# Patient Record
Sex: MALE | Race: WHITE | NOT HISPANIC OR LATINO | Employment: UNEMPLOYED | URBAN - METROPOLITAN AREA
[De-identification: names, ages, dates, MRNs, and addresses within clinical notes are randomized per-mention and may not be internally consistent; named-entity substitution may affect disease eponyms.]

---

## 2019-06-12 ENCOUNTER — OFFICE VISIT - HEALTHEAST (OUTPATIENT)
Dept: ADDICTION MEDICINE | Facility: CLINIC | Age: 29
End: 2019-06-12

## 2019-06-12 DIAGNOSIS — F10.20 ALCOHOL USE DISORDER, MODERATE, DEPENDENCE (H): ICD-10-CM

## 2019-06-13 ENCOUNTER — AMBULATORY - HEALTHEAST (OUTPATIENT)
Dept: ADDICTION MEDICINE | Facility: CLINIC | Age: 29
End: 2019-06-13

## 2019-06-13 ENCOUNTER — OFFICE VISIT - HEALTHEAST (OUTPATIENT)
Dept: ADDICTION MEDICINE | Facility: CLINIC | Age: 29
End: 2019-06-13

## 2019-06-13 DIAGNOSIS — F10.20 ALCOHOL USE DISORDER, MODERATE, DEPENDENCE (H): ICD-10-CM

## 2019-06-13 DIAGNOSIS — F33.1 MAJOR DEPRESSIVE DISORDER, RECURRENT EPISODE, MODERATE (H): ICD-10-CM

## 2019-06-13 DIAGNOSIS — F41.1 GENERALIZED ANXIETY DISORDER: ICD-10-CM

## 2019-06-14 ENCOUNTER — OFFICE VISIT - HEALTHEAST (OUTPATIENT)
Dept: ADDICTION MEDICINE | Facility: CLINIC | Age: 29
End: 2019-06-14

## 2019-06-14 ENCOUNTER — AMBULATORY - HEALTHEAST (OUTPATIENT)
Dept: ADDICTION MEDICINE | Facility: CLINIC | Age: 29
End: 2019-06-14

## 2019-06-14 DIAGNOSIS — F10.20 ALCOHOL USE DISORDER, MODERATE, DEPENDENCE (H): ICD-10-CM

## 2019-06-17 ENCOUNTER — OFFICE VISIT - HEALTHEAST (OUTPATIENT)
Dept: ADDICTION MEDICINE | Facility: CLINIC | Age: 29
End: 2019-06-17

## 2019-06-17 DIAGNOSIS — F10.20 ALCOHOL USE DISORDER, MODERATE, DEPENDENCE (H): ICD-10-CM

## 2019-06-19 ENCOUNTER — OFFICE VISIT - HEALTHEAST (OUTPATIENT)
Dept: ADDICTION MEDICINE | Facility: CLINIC | Age: 29
End: 2019-06-19

## 2019-06-19 ENCOUNTER — AMBULATORY - HEALTHEAST (OUTPATIENT)
Dept: LAB | Facility: CLINIC | Age: 29
End: 2019-06-19

## 2019-06-19 DIAGNOSIS — F10.20 ALCOHOL USE DISORDER, MODERATE, DEPENDENCE (H): ICD-10-CM

## 2019-06-19 LAB
AMPHETAMINES UR QL SCN: ABNORMAL
BARBITURATES UR QL: ABNORMAL
BENZODIAZ UR QL: ABNORMAL
CANNABINOIDS UR QL SCN: ABNORMAL
COCAINE UR QL: ABNORMAL
CREAT UR-MCNC: 120.7 MG/DL
METHADONE UR QL SCN: ABNORMAL
OPIATES UR QL SCN: ABNORMAL
OXYCODONE UR QL: ABNORMAL
PCP UR QL SCN: ABNORMAL

## 2019-06-20 ENCOUNTER — OFFICE VISIT - HEALTHEAST (OUTPATIENT)
Dept: ADDICTION MEDICINE | Facility: CLINIC | Age: 29
End: 2019-06-20

## 2019-06-20 DIAGNOSIS — F10.20 ALCOHOL USE DISORDER, MODERATE, DEPENDENCE (H): ICD-10-CM

## 2019-06-21 ENCOUNTER — AMBULATORY - HEALTHEAST (OUTPATIENT)
Dept: ADDICTION MEDICINE | Facility: CLINIC | Age: 29
End: 2019-06-21

## 2019-06-21 ENCOUNTER — OFFICE VISIT - HEALTHEAST (OUTPATIENT)
Dept: ADDICTION MEDICINE | Facility: CLINIC | Age: 29
End: 2019-06-21

## 2019-06-21 DIAGNOSIS — F10.20 ALCOHOL USE DISORDER, MODERATE, DEPENDENCE (H): ICD-10-CM

## 2019-06-22 ENCOUNTER — AMBULATORY - HEALTHEAST (OUTPATIENT)
Dept: ADDICTION MEDICINE | Facility: CLINIC | Age: 29
End: 2019-06-22

## 2019-06-22 LAB
ETHYL GLUCURONIDE UR CFM-MCNC: <100 NG/ML
ETHYL SULFATE UR CFM-MCNC: <100 NG/ML

## 2019-06-24 ENCOUNTER — OFFICE VISIT - HEALTHEAST (OUTPATIENT)
Dept: ADDICTION MEDICINE | Facility: CLINIC | Age: 29
End: 2019-06-24

## 2019-06-24 DIAGNOSIS — F10.20 ALCOHOL USE DISORDER, MODERATE, DEPENDENCE (H): ICD-10-CM

## 2019-06-26 ENCOUNTER — OFFICE VISIT - HEALTHEAST (OUTPATIENT)
Dept: ADDICTION MEDICINE | Facility: CLINIC | Age: 29
End: 2019-06-26

## 2019-06-26 DIAGNOSIS — F10.20 ALCOHOL USE DISORDER, MODERATE, DEPENDENCE (H): ICD-10-CM

## 2019-06-27 ENCOUNTER — OFFICE VISIT - HEALTHEAST (OUTPATIENT)
Dept: BEHAVIORAL HEALTH | Facility: CLINIC | Age: 29
End: 2019-06-27

## 2019-06-27 ENCOUNTER — OFFICE VISIT - HEALTHEAST (OUTPATIENT)
Dept: ADDICTION MEDICINE | Facility: CLINIC | Age: 29
End: 2019-06-27

## 2019-06-27 DIAGNOSIS — F33.1 MAJOR DEPRESSIVE DISORDER, RECURRENT EPISODE, MODERATE (H): ICD-10-CM

## 2019-06-27 DIAGNOSIS — F10.20 ALCOHOL USE DISORDER, MODERATE, DEPENDENCE (H): ICD-10-CM

## 2019-06-28 ENCOUNTER — OFFICE VISIT - HEALTHEAST (OUTPATIENT)
Dept: ADDICTION MEDICINE | Facility: CLINIC | Age: 29
End: 2019-06-28

## 2019-06-28 DIAGNOSIS — F10.20 ALCOHOL USE DISORDER, MODERATE, DEPENDENCE (H): ICD-10-CM

## 2019-07-01 ENCOUNTER — OFFICE VISIT - HEALTHEAST (OUTPATIENT)
Dept: ADDICTION MEDICINE | Facility: CLINIC | Age: 29
End: 2019-07-01

## 2019-07-01 ENCOUNTER — AMBULATORY - HEALTHEAST (OUTPATIENT)
Dept: ADDICTION MEDICINE | Facility: CLINIC | Age: 29
End: 2019-07-01

## 2019-07-01 DIAGNOSIS — F10.20 ALCOHOL USE DISORDER, MODERATE, DEPENDENCE (H): ICD-10-CM

## 2019-07-03 ENCOUNTER — OFFICE VISIT - HEALTHEAST (OUTPATIENT)
Dept: ADDICTION MEDICINE | Facility: CLINIC | Age: 29
End: 2019-07-03

## 2019-07-03 DIAGNOSIS — F10.20 ALCOHOL USE DISORDER, MODERATE, DEPENDENCE (H): ICD-10-CM

## 2019-07-04 ENCOUNTER — AMBULATORY - HEALTHEAST (OUTPATIENT)
Dept: ADDICTION MEDICINE | Facility: CLINIC | Age: 29
End: 2019-07-04

## 2019-07-05 ENCOUNTER — AMBULATORY - HEALTHEAST (OUTPATIENT)
Dept: LAB | Facility: CLINIC | Age: 29
End: 2019-07-05

## 2019-07-05 ENCOUNTER — OFFICE VISIT - HEALTHEAST (OUTPATIENT)
Dept: ADDICTION MEDICINE | Facility: CLINIC | Age: 29
End: 2019-07-05

## 2019-07-05 DIAGNOSIS — F10.20 ALCOHOL USE DISORDER, MODERATE, DEPENDENCE (H): ICD-10-CM

## 2019-07-05 LAB
AMPHETAMINES UR QL SCN: NORMAL
BARBITURATES UR QL: NORMAL
BENZODIAZ UR QL: NORMAL
CANNABINOIDS UR QL SCN: NORMAL
COCAINE UR QL: NORMAL
CREAT UR-MCNC: 18.1 MG/DL
METHADONE UR QL SCN: NORMAL
OPIATES UR QL SCN: NORMAL
OXYCODONE UR QL: NORMAL
PCP UR QL SCN: NORMAL

## 2019-07-08 ENCOUNTER — AMBULATORY - HEALTHEAST (OUTPATIENT)
Dept: LAB | Facility: CLINIC | Age: 29
End: 2019-07-08

## 2019-07-08 ENCOUNTER — OFFICE VISIT - HEALTHEAST (OUTPATIENT)
Dept: ADDICTION MEDICINE | Facility: CLINIC | Age: 29
End: 2019-07-08

## 2019-07-08 DIAGNOSIS — F10.20 ALCOHOL USE DISORDER, MODERATE, DEPENDENCE (H): ICD-10-CM

## 2019-07-08 LAB
AMPHETAMINES UR QL SCN: NORMAL
BARBITURATES UR QL: NORMAL
BENZODIAZ UR QL: NORMAL
CANNABINOIDS UR QL SCN: NORMAL
COCAINE UR QL: NORMAL
CREAT UR-MCNC: 22.3 MG/DL
METHADONE UR QL SCN: NORMAL
OPIATES UR QL SCN: NORMAL
OXYCODONE UR QL: NORMAL
PCP UR QL SCN: NORMAL

## 2019-07-10 ENCOUNTER — OFFICE VISIT - HEALTHEAST (OUTPATIENT)
Dept: ADDICTION MEDICINE | Facility: CLINIC | Age: 29
End: 2019-07-10

## 2019-07-10 DIAGNOSIS — F10.20 ALCOHOL USE DISORDER, MODERATE, DEPENDENCE (H): ICD-10-CM

## 2019-07-10 LAB
ETHYL GLUCURONIDE UR CFM-MCNC: <100 NG/ML
ETHYL SULFATE UR CFM-MCNC: <100 NG/ML

## 2019-07-11 ENCOUNTER — COMMUNICATION - HEALTHEAST (OUTPATIENT)
Dept: ADDICTION MEDICINE | Facility: CLINIC | Age: 29
End: 2019-07-11

## 2019-07-12 ENCOUNTER — OFFICE VISIT - HEALTHEAST (OUTPATIENT)
Dept: BEHAVIORAL HEALTH | Facility: CLINIC | Age: 29
End: 2019-07-12

## 2019-07-12 ENCOUNTER — OFFICE VISIT - HEALTHEAST (OUTPATIENT)
Dept: ADDICTION MEDICINE | Facility: CLINIC | Age: 29
End: 2019-07-12

## 2019-07-12 ENCOUNTER — AMBULATORY - HEALTHEAST (OUTPATIENT)
Dept: ADDICTION MEDICINE | Facility: CLINIC | Age: 29
End: 2019-07-12

## 2019-07-12 DIAGNOSIS — F33.1 MAJOR DEPRESSIVE DISORDER, RECURRENT EPISODE, MODERATE (H): ICD-10-CM

## 2019-07-12 DIAGNOSIS — F10.20 ALCOHOL USE DISORDER, MODERATE, DEPENDENCE (H): ICD-10-CM

## 2019-07-15 ENCOUNTER — OFFICE VISIT - HEALTHEAST (OUTPATIENT)
Dept: ADDICTION MEDICINE | Facility: CLINIC | Age: 29
End: 2019-07-15

## 2019-07-15 DIAGNOSIS — F10.20 ALCOHOL USE DISORDER, MODERATE, DEPENDENCE (H): ICD-10-CM

## 2019-07-17 ENCOUNTER — OFFICE VISIT - HEALTHEAST (OUTPATIENT)
Dept: ADDICTION MEDICINE | Facility: CLINIC | Age: 29
End: 2019-07-17

## 2019-07-17 DIAGNOSIS — F10.20 ALCOHOL USE DISORDER, MODERATE, DEPENDENCE (H): ICD-10-CM

## 2019-07-18 ENCOUNTER — AMBULATORY - HEALTHEAST (OUTPATIENT)
Dept: ADDICTION MEDICINE | Facility: CLINIC | Age: 29
End: 2019-07-18

## 2019-07-18 ENCOUNTER — OFFICE VISIT - HEALTHEAST (OUTPATIENT)
Dept: ADDICTION MEDICINE | Facility: CLINIC | Age: 29
End: 2019-07-18

## 2019-07-18 DIAGNOSIS — F10.20 ALCOHOL USE DISORDER, MODERATE, DEPENDENCE (H): ICD-10-CM

## 2019-07-19 ENCOUNTER — AMBULATORY - HEALTHEAST (OUTPATIENT)
Dept: ADDICTION MEDICINE | Facility: CLINIC | Age: 29
End: 2019-07-19

## 2019-07-19 ENCOUNTER — OFFICE VISIT - HEALTHEAST (OUTPATIENT)
Dept: BEHAVIORAL HEALTH | Facility: CLINIC | Age: 29
End: 2019-07-19

## 2019-07-19 ENCOUNTER — OFFICE VISIT - HEALTHEAST (OUTPATIENT)
Dept: ADDICTION MEDICINE | Facility: CLINIC | Age: 29
End: 2019-07-19

## 2019-07-19 DIAGNOSIS — F41.1 GAD (GENERALIZED ANXIETY DISORDER): ICD-10-CM

## 2019-07-19 DIAGNOSIS — F10.20 ALCOHOL USE DISORDER, MODERATE, DEPENDENCE (H): ICD-10-CM

## 2019-07-19 DIAGNOSIS — F10.20 MODERATE ALCOHOL USE DISORDER (H): ICD-10-CM

## 2019-07-19 DIAGNOSIS — F33.1 MAJOR DEPRESSIVE DISORDER, RECURRENT EPISODE, MODERATE (H): ICD-10-CM

## 2019-07-22 ENCOUNTER — OFFICE VISIT - HEALTHEAST (OUTPATIENT)
Dept: ADDICTION MEDICINE | Facility: CLINIC | Age: 29
End: 2019-07-22

## 2019-07-22 DIAGNOSIS — F10.20 ALCOHOL USE DISORDER, MODERATE, DEPENDENCE (H): ICD-10-CM

## 2019-07-24 ENCOUNTER — OFFICE VISIT - HEALTHEAST (OUTPATIENT)
Dept: ADDICTION MEDICINE | Facility: CLINIC | Age: 29
End: 2019-07-24

## 2019-07-24 DIAGNOSIS — F10.20 ALCOHOL USE DISORDER, MODERATE, DEPENDENCE (H): ICD-10-CM

## 2019-07-25 ENCOUNTER — OFFICE VISIT - HEALTHEAST (OUTPATIENT)
Dept: ADDICTION MEDICINE | Facility: CLINIC | Age: 29
End: 2019-07-25

## 2019-07-25 DIAGNOSIS — F10.20 ALCOHOL USE DISORDER, MODERATE, DEPENDENCE (H): ICD-10-CM

## 2019-07-26 ENCOUNTER — AMBULATORY - HEALTHEAST (OUTPATIENT)
Dept: ADDICTION MEDICINE | Facility: CLINIC | Age: 29
End: 2019-07-26

## 2019-07-26 ENCOUNTER — OFFICE VISIT - HEALTHEAST (OUTPATIENT)
Dept: ADDICTION MEDICINE | Facility: CLINIC | Age: 29
End: 2019-07-26

## 2019-07-26 ENCOUNTER — OFFICE VISIT - HEALTHEAST (OUTPATIENT)
Dept: BEHAVIORAL HEALTH | Facility: CLINIC | Age: 29
End: 2019-07-26

## 2019-07-26 ENCOUNTER — AMBULATORY - HEALTHEAST (OUTPATIENT)
Dept: BEHAVIORAL HEALTH | Facility: CLINIC | Age: 29
End: 2019-07-26

## 2019-07-26 DIAGNOSIS — F41.1 GAD (GENERALIZED ANXIETY DISORDER): ICD-10-CM

## 2019-07-26 DIAGNOSIS — F10.20 ALCOHOL USE DISORDER, MODERATE, DEPENDENCE (H): ICD-10-CM

## 2019-07-26 DIAGNOSIS — F10.20 MODERATE ALCOHOL USE DISORDER (H): ICD-10-CM

## 2019-07-26 DIAGNOSIS — F33.1 MAJOR DEPRESSIVE DISORDER, RECURRENT EPISODE, MODERATE (H): ICD-10-CM

## 2019-07-29 ENCOUNTER — OFFICE VISIT - HEALTHEAST (OUTPATIENT)
Dept: ADDICTION MEDICINE | Facility: CLINIC | Age: 29
End: 2019-07-29

## 2019-07-29 DIAGNOSIS — F10.20 ALCOHOL USE DISORDER, MODERATE, DEPENDENCE (H): ICD-10-CM

## 2019-07-31 ENCOUNTER — OFFICE VISIT - HEALTHEAST (OUTPATIENT)
Dept: ADDICTION MEDICINE | Facility: CLINIC | Age: 29
End: 2019-07-31

## 2019-07-31 DIAGNOSIS — F10.20 ALCOHOL USE DISORDER, MODERATE, DEPENDENCE (H): ICD-10-CM

## 2019-08-01 ENCOUNTER — OFFICE VISIT - HEALTHEAST (OUTPATIENT)
Dept: ADDICTION MEDICINE | Facility: CLINIC | Age: 29
End: 2019-08-01

## 2019-08-01 DIAGNOSIS — F10.20 ALCOHOL USE DISORDER, MODERATE, DEPENDENCE (H): ICD-10-CM

## 2019-08-02 ENCOUNTER — AMBULATORY - HEALTHEAST (OUTPATIENT)
Dept: ADDICTION MEDICINE | Facility: CLINIC | Age: 29
End: 2019-08-02

## 2019-08-02 ENCOUNTER — OFFICE VISIT - HEALTHEAST (OUTPATIENT)
Dept: ADDICTION MEDICINE | Facility: CLINIC | Age: 29
End: 2019-08-02

## 2019-08-02 DIAGNOSIS — F10.20 ALCOHOL USE DISORDER, MODERATE, DEPENDENCE (H): ICD-10-CM

## 2019-08-05 ENCOUNTER — AMBULATORY - HEALTHEAST (OUTPATIENT)
Dept: LAB | Facility: CLINIC | Age: 29
End: 2019-08-05

## 2019-08-05 ENCOUNTER — OFFICE VISIT - HEALTHEAST (OUTPATIENT)
Dept: ADDICTION MEDICINE | Facility: CLINIC | Age: 29
End: 2019-08-05

## 2019-08-05 DIAGNOSIS — F10.20 ALCOHOL USE DISORDER, MODERATE, DEPENDENCE (H): ICD-10-CM

## 2019-08-05 LAB
AMPHETAMINES UR QL SCN: ABNORMAL
BARBITURATES UR QL: ABNORMAL
BENZODIAZ UR QL: ABNORMAL
CANNABINOIDS UR QL SCN: ABNORMAL
COCAINE UR QL: ABNORMAL
CREAT UR-MCNC: 99.5 MG/DL
METHADONE UR QL SCN: ABNORMAL
OPIATES UR QL SCN: ABNORMAL
OXYCODONE UR QL: ABNORMAL
PCP UR QL SCN: ABNORMAL

## 2019-08-07 ENCOUNTER — OFFICE VISIT - HEALTHEAST (OUTPATIENT)
Dept: ADDICTION MEDICINE | Facility: CLINIC | Age: 29
End: 2019-08-07

## 2019-08-07 DIAGNOSIS — F10.20 ALCOHOL USE DISORDER, MODERATE, DEPENDENCE (H): ICD-10-CM

## 2019-08-07 LAB
ETHYL GLUCURONIDE UR CFM-MCNC: <100 NG/ML
ETHYL SULFATE UR CFM-MCNC: <100 NG/ML

## 2019-08-08 ENCOUNTER — OFFICE VISIT - HEALTHEAST (OUTPATIENT)
Dept: BEHAVIORAL HEALTH | Facility: CLINIC | Age: 29
End: 2019-08-08

## 2019-08-08 DIAGNOSIS — F10.20 MODERATE ALCOHOL USE DISORDER (H): ICD-10-CM

## 2019-08-08 DIAGNOSIS — F33.1 MAJOR DEPRESSIVE DISORDER, RECURRENT EPISODE, MODERATE (H): ICD-10-CM

## 2019-08-08 DIAGNOSIS — F41.1 GAD (GENERALIZED ANXIETY DISORDER): ICD-10-CM

## 2019-08-09 ENCOUNTER — OFFICE VISIT - HEALTHEAST (OUTPATIENT)
Dept: ADDICTION MEDICINE | Facility: CLINIC | Age: 29
End: 2019-08-09

## 2019-08-09 DIAGNOSIS — F10.20 ALCOHOL USE DISORDER, MODERATE, DEPENDENCE (H): ICD-10-CM

## 2019-08-10 ENCOUNTER — AMBULATORY - HEALTHEAST (OUTPATIENT)
Dept: ADDICTION MEDICINE | Facility: CLINIC | Age: 29
End: 2019-08-10

## 2019-08-12 ENCOUNTER — AMBULATORY - HEALTHEAST (OUTPATIENT)
Dept: ADDICTION MEDICINE | Facility: CLINIC | Age: 29
End: 2019-08-12

## 2019-08-12 ENCOUNTER — OFFICE VISIT - HEALTHEAST (OUTPATIENT)
Dept: ADDICTION MEDICINE | Facility: CLINIC | Age: 29
End: 2019-08-12

## 2019-08-12 DIAGNOSIS — F10.20 ALCOHOL USE DISORDER, MODERATE, DEPENDENCE (H): ICD-10-CM

## 2019-08-13 ENCOUNTER — COMMUNICATION - HEALTHEAST (OUTPATIENT)
Dept: ADDICTION MEDICINE | Facility: CLINIC | Age: 29
End: 2019-08-13

## 2019-08-14 ENCOUNTER — COMMUNICATION - HEALTHEAST (OUTPATIENT)
Dept: ADDICTION MEDICINE | Facility: CLINIC | Age: 29
End: 2019-08-14

## 2019-08-15 ENCOUNTER — OFFICE VISIT - HEALTHEAST (OUTPATIENT)
Dept: ADDICTION MEDICINE | Facility: CLINIC | Age: 29
End: 2019-08-15

## 2019-08-15 DIAGNOSIS — F10.20 ALCOHOL USE DISORDER, MODERATE, DEPENDENCE (H): ICD-10-CM

## 2019-08-16 ENCOUNTER — OFFICE VISIT - HEALTHEAST (OUTPATIENT)
Dept: ADDICTION MEDICINE | Facility: CLINIC | Age: 29
End: 2019-08-16

## 2019-08-16 ENCOUNTER — OFFICE VISIT - HEALTHEAST (OUTPATIENT)
Dept: BEHAVIORAL HEALTH | Facility: CLINIC | Age: 29
End: 2019-08-16

## 2019-08-16 DIAGNOSIS — F33.1 MAJOR DEPRESSIVE DISORDER, RECURRENT EPISODE, MODERATE (H): ICD-10-CM

## 2019-08-16 DIAGNOSIS — F10.20 MODERATE ALCOHOL USE DISORDER (H): ICD-10-CM

## 2019-08-16 DIAGNOSIS — F41.1 GAD (GENERALIZED ANXIETY DISORDER): ICD-10-CM

## 2019-08-16 DIAGNOSIS — F10.20 ALCOHOL USE DISORDER, MODERATE, DEPENDENCE (H): ICD-10-CM

## 2019-08-17 ENCOUNTER — AMBULATORY - HEALTHEAST (OUTPATIENT)
Dept: ADDICTION MEDICINE | Facility: CLINIC | Age: 29
End: 2019-08-17

## 2019-08-19 ENCOUNTER — OFFICE VISIT - HEALTHEAST (OUTPATIENT)
Dept: ADDICTION MEDICINE | Facility: CLINIC | Age: 29
End: 2019-08-19

## 2019-08-19 DIAGNOSIS — F10.20 ALCOHOL USE DISORDER, MODERATE, DEPENDENCE (H): ICD-10-CM

## 2019-08-22 ENCOUNTER — OFFICE VISIT - HEALTHEAST (OUTPATIENT)
Dept: ADDICTION MEDICINE | Facility: CLINIC | Age: 29
End: 2019-08-22

## 2019-08-22 DIAGNOSIS — F10.20 ALCOHOL USE DISORDER, MODERATE, DEPENDENCE (H): ICD-10-CM

## 2019-08-23 ENCOUNTER — OFFICE VISIT - HEALTHEAST (OUTPATIENT)
Dept: ADDICTION MEDICINE | Facility: CLINIC | Age: 29
End: 2019-08-23

## 2019-08-23 ENCOUNTER — AMBULATORY - HEALTHEAST (OUTPATIENT)
Dept: ADDICTION MEDICINE | Facility: CLINIC | Age: 29
End: 2019-08-23

## 2019-08-23 ENCOUNTER — OFFICE VISIT - HEALTHEAST (OUTPATIENT)
Dept: BEHAVIORAL HEALTH | Facility: CLINIC | Age: 29
End: 2019-08-23

## 2019-08-23 DIAGNOSIS — F33.1 MAJOR DEPRESSIVE DISORDER, RECURRENT EPISODE, MODERATE (H): ICD-10-CM

## 2019-08-23 DIAGNOSIS — F10.20 MODERATE ALCOHOL USE DISORDER (H): ICD-10-CM

## 2019-08-23 DIAGNOSIS — F10.20 ALCOHOL USE DISORDER, MODERATE, DEPENDENCE (H): ICD-10-CM

## 2019-08-23 DIAGNOSIS — F41.1 GAD (GENERALIZED ANXIETY DISORDER): ICD-10-CM

## 2019-08-26 ENCOUNTER — OFFICE VISIT - HEALTHEAST (OUTPATIENT)
Dept: ADDICTION MEDICINE | Facility: CLINIC | Age: 29
End: 2019-08-26

## 2019-08-26 ENCOUNTER — AMBULATORY - HEALTHEAST (OUTPATIENT)
Dept: ADDICTION MEDICINE | Facility: CLINIC | Age: 29
End: 2019-08-26

## 2019-08-26 DIAGNOSIS — F10.20 ALCOHOL USE DISORDER, MODERATE, DEPENDENCE (H): ICD-10-CM

## 2019-08-27 ENCOUNTER — OFFICE VISIT - HEALTHEAST (OUTPATIENT)
Dept: ADDICTION MEDICINE | Facility: CLINIC | Age: 29
End: 2019-08-27

## 2019-08-27 DIAGNOSIS — F10.20 ALCOHOL USE DISORDER, MODERATE, DEPENDENCE (H): ICD-10-CM

## 2019-08-28 ENCOUNTER — OFFICE VISIT - HEALTHEAST (OUTPATIENT)
Dept: ADDICTION MEDICINE | Facility: CLINIC | Age: 29
End: 2019-08-28

## 2019-08-28 DIAGNOSIS — F10.20 ALCOHOL USE DISORDER, MODERATE, DEPENDENCE (H): ICD-10-CM

## 2019-08-29 ENCOUNTER — OFFICE VISIT - HEALTHEAST (OUTPATIENT)
Dept: ADDICTION MEDICINE | Facility: CLINIC | Age: 29
End: 2019-08-29

## 2019-08-29 DIAGNOSIS — F10.20 ALCOHOL USE DISORDER, MODERATE, DEPENDENCE (H): ICD-10-CM

## 2019-08-30 ENCOUNTER — OFFICE VISIT - HEALTHEAST (OUTPATIENT)
Dept: BEHAVIORAL HEALTH | Facility: CLINIC | Age: 29
End: 2019-08-30

## 2019-08-30 ENCOUNTER — OFFICE VISIT - HEALTHEAST (OUTPATIENT)
Dept: ADDICTION MEDICINE | Facility: CLINIC | Age: 29
End: 2019-08-30

## 2019-08-30 ENCOUNTER — AMBULATORY - HEALTHEAST (OUTPATIENT)
Dept: ADDICTION MEDICINE | Facility: CLINIC | Age: 29
End: 2019-08-30

## 2019-08-30 DIAGNOSIS — F41.1 GAD (GENERALIZED ANXIETY DISORDER): ICD-10-CM

## 2019-08-30 DIAGNOSIS — F10.20 ALCOHOL USE DISORDER, MODERATE, DEPENDENCE (H): ICD-10-CM

## 2019-08-30 DIAGNOSIS — F33.1 MAJOR DEPRESSIVE DISORDER, RECURRENT EPISODE, MODERATE (H): ICD-10-CM

## 2019-08-30 DIAGNOSIS — F10.20 MODERATE ALCOHOL USE DISORDER (H): ICD-10-CM

## 2019-09-03 ENCOUNTER — OFFICE VISIT - HEALTHEAST (OUTPATIENT)
Dept: ADDICTION MEDICINE | Facility: CLINIC | Age: 29
End: 2019-09-03

## 2019-09-03 DIAGNOSIS — F10.20 ALCOHOL USE DISORDER, MODERATE, DEPENDENCE (H): ICD-10-CM

## 2019-09-04 ENCOUNTER — OFFICE VISIT - HEALTHEAST (OUTPATIENT)
Dept: ADDICTION MEDICINE | Facility: CLINIC | Age: 29
End: 2019-09-04

## 2019-09-04 ENCOUNTER — COMMUNICATION - HEALTHEAST (OUTPATIENT)
Dept: ADDICTION MEDICINE | Facility: CLINIC | Age: 29
End: 2019-09-04

## 2019-09-04 DIAGNOSIS — F10.20 ALCOHOL USE DISORDER, MODERATE, DEPENDENCE (H): ICD-10-CM

## 2019-09-05 ENCOUNTER — OFFICE VISIT - HEALTHEAST (OUTPATIENT)
Dept: ADDICTION MEDICINE | Facility: CLINIC | Age: 29
End: 2019-09-05

## 2019-09-05 DIAGNOSIS — F10.20 ALCOHOL USE DISORDER, MODERATE, DEPENDENCE (H): ICD-10-CM

## 2019-09-06 ENCOUNTER — OFFICE VISIT - HEALTHEAST (OUTPATIENT)
Dept: ADDICTION MEDICINE | Facility: CLINIC | Age: 29
End: 2019-09-06

## 2019-09-06 ENCOUNTER — OFFICE VISIT - HEALTHEAST (OUTPATIENT)
Dept: BEHAVIORAL HEALTH | Facility: CLINIC | Age: 29
End: 2019-09-06

## 2019-09-06 DIAGNOSIS — F10.20 ALCOHOL USE DISORDER, MODERATE, DEPENDENCE (H): ICD-10-CM

## 2019-09-06 DIAGNOSIS — F33.1 MAJOR DEPRESSIVE DISORDER, RECURRENT EPISODE, MODERATE (H): ICD-10-CM

## 2019-09-06 DIAGNOSIS — F10.20 MODERATE ALCOHOL USE DISORDER (H): ICD-10-CM

## 2019-09-06 DIAGNOSIS — F41.1 GAD (GENERALIZED ANXIETY DISORDER): ICD-10-CM

## 2019-09-07 ENCOUNTER — AMBULATORY - HEALTHEAST (OUTPATIENT)
Dept: ADDICTION MEDICINE | Facility: CLINIC | Age: 29
End: 2019-09-07

## 2019-09-09 ENCOUNTER — OFFICE VISIT - HEALTHEAST (OUTPATIENT)
Dept: ADDICTION MEDICINE | Facility: CLINIC | Age: 29
End: 2019-09-09

## 2019-09-09 DIAGNOSIS — F10.20 ALCOHOL USE DISORDER, MODERATE, DEPENDENCE (H): ICD-10-CM

## 2019-09-10 ENCOUNTER — COMMUNICATION - HEALTHEAST (OUTPATIENT)
Dept: ADDICTION MEDICINE | Facility: CLINIC | Age: 29
End: 2019-09-10

## 2019-09-11 ENCOUNTER — OFFICE VISIT - HEALTHEAST (OUTPATIENT)
Dept: ADDICTION MEDICINE | Facility: CLINIC | Age: 29
End: 2019-09-11

## 2019-09-11 DIAGNOSIS — F10.20 ALCOHOL USE DISORDER, MODERATE, DEPENDENCE (H): ICD-10-CM

## 2019-09-12 ENCOUNTER — COMMUNICATION - HEALTHEAST (OUTPATIENT)
Dept: ADDICTION MEDICINE | Facility: CLINIC | Age: 29
End: 2019-09-12

## 2019-09-13 ENCOUNTER — OFFICE VISIT - HEALTHEAST (OUTPATIENT)
Dept: ADDICTION MEDICINE | Facility: CLINIC | Age: 29
End: 2019-09-13

## 2019-09-13 ENCOUNTER — OFFICE VISIT - HEALTHEAST (OUTPATIENT)
Dept: BEHAVIORAL HEALTH | Facility: CLINIC | Age: 29
End: 2019-09-13

## 2019-09-13 ENCOUNTER — AMBULATORY - HEALTHEAST (OUTPATIENT)
Dept: ADDICTION MEDICINE | Facility: CLINIC | Age: 29
End: 2019-09-13

## 2019-09-13 DIAGNOSIS — F33.1 MAJOR DEPRESSIVE DISORDER, RECURRENT EPISODE, MODERATE (H): ICD-10-CM

## 2019-09-13 DIAGNOSIS — F10.20 ALCOHOL USE DISORDER, MODERATE, DEPENDENCE (H): ICD-10-CM

## 2019-09-13 DIAGNOSIS — F41.1 GAD (GENERALIZED ANXIETY DISORDER): ICD-10-CM

## 2019-09-13 DIAGNOSIS — F10.20 MODERATE ALCOHOL USE DISORDER (H): ICD-10-CM

## 2019-09-16 ENCOUNTER — OFFICE VISIT - HEALTHEAST (OUTPATIENT)
Dept: ADDICTION MEDICINE | Facility: CLINIC | Age: 29
End: 2019-09-16

## 2019-09-16 DIAGNOSIS — F10.20 ALCOHOL USE DISORDER, MODERATE, DEPENDENCE (H): ICD-10-CM

## 2019-09-17 ENCOUNTER — COMMUNICATION - HEALTHEAST (OUTPATIENT)
Dept: BEHAVIORAL HEALTH | Facility: CLINIC | Age: 29
End: 2019-09-17

## 2019-09-17 ENCOUNTER — COMMUNICATION - HEALTHEAST (OUTPATIENT)
Dept: ADDICTION MEDICINE | Facility: CLINIC | Age: 29
End: 2019-09-17

## 2019-09-19 ENCOUNTER — AMBULATORY - HEALTHEAST (OUTPATIENT)
Dept: ADDICTION MEDICINE | Facility: CLINIC | Age: 29
End: 2019-09-19

## 2019-09-19 ENCOUNTER — OFFICE VISIT - HEALTHEAST (OUTPATIENT)
Dept: ADDICTION MEDICINE | Facility: CLINIC | Age: 29
End: 2019-09-19

## 2019-09-19 DIAGNOSIS — F10.20 ALCOHOL USE DISORDER, MODERATE, DEPENDENCE (H): ICD-10-CM

## 2019-09-20 ENCOUNTER — OFFICE VISIT - HEALTHEAST (OUTPATIENT)
Dept: BEHAVIORAL HEALTH | Facility: CLINIC | Age: 29
End: 2019-09-20

## 2019-09-20 DIAGNOSIS — F10.20 MODERATE ALCOHOL USE DISORDER (H): ICD-10-CM

## 2019-09-20 DIAGNOSIS — F33.1 MAJOR DEPRESSIVE DISORDER, RECURRENT EPISODE, MODERATE (H): ICD-10-CM

## 2019-09-20 DIAGNOSIS — F41.1 GAD (GENERALIZED ANXIETY DISORDER): ICD-10-CM

## 2019-09-24 ENCOUNTER — OFFICE VISIT - HEALTHEAST (OUTPATIENT)
Dept: ADDICTION MEDICINE | Facility: CLINIC | Age: 29
End: 2019-09-24

## 2019-09-24 DIAGNOSIS — F10.20 ALCOHOL USE DISORDER, MODERATE, DEPENDENCE (H): ICD-10-CM

## 2019-09-25 ENCOUNTER — OFFICE VISIT - HEALTHEAST (OUTPATIENT)
Dept: BEHAVIORAL HEALTH | Facility: CLINIC | Age: 29
End: 2019-09-25

## 2019-09-25 DIAGNOSIS — F10.20 MODERATE ALCOHOL USE DISORDER (H): ICD-10-CM

## 2019-09-25 DIAGNOSIS — F33.1 MAJOR DEPRESSIVE DISORDER, RECURRENT EPISODE, MODERATE (H): ICD-10-CM

## 2019-09-25 DIAGNOSIS — F41.1 GAD (GENERALIZED ANXIETY DISORDER): ICD-10-CM

## 2019-09-26 ENCOUNTER — AMBULATORY - HEALTHEAST (OUTPATIENT)
Dept: ADDICTION MEDICINE | Facility: CLINIC | Age: 29
End: 2019-09-26

## 2019-09-27 ENCOUNTER — OFFICE VISIT - HEALTHEAST (OUTPATIENT)
Dept: BEHAVIORAL HEALTH | Facility: CLINIC | Age: 29
End: 2019-09-27

## 2019-09-27 DIAGNOSIS — F33.1 MAJOR DEPRESSIVE DISORDER, RECURRENT EPISODE, MODERATE (H): ICD-10-CM

## 2019-09-27 DIAGNOSIS — F41.1 GAD (GENERALIZED ANXIETY DISORDER): ICD-10-CM

## 2019-09-27 DIAGNOSIS — F10.20 MODERATE ALCOHOL USE DISORDER (H): ICD-10-CM

## 2019-10-01 ENCOUNTER — OFFICE VISIT - HEALTHEAST (OUTPATIENT)
Dept: ADDICTION MEDICINE | Facility: CLINIC | Age: 29
End: 2019-10-01

## 2019-10-01 DIAGNOSIS — F10.20 ALCOHOL USE DISORDER, MODERATE, DEPENDENCE (H): ICD-10-CM

## 2019-10-02 ENCOUNTER — COMMUNICATION - HEALTHEAST (OUTPATIENT)
Dept: ADDICTION MEDICINE | Facility: CLINIC | Age: 29
End: 2019-10-02

## 2019-10-02 ENCOUNTER — AMBULATORY - HEALTHEAST (OUTPATIENT)
Dept: BEHAVIORAL HEALTH | Facility: CLINIC | Age: 29
End: 2019-10-02

## 2019-10-03 ENCOUNTER — OFFICE VISIT - HEALTHEAST (OUTPATIENT)
Dept: BEHAVIORAL HEALTH | Facility: CLINIC | Age: 29
End: 2019-10-03

## 2019-10-03 ENCOUNTER — OFFICE VISIT - HEALTHEAST (OUTPATIENT)
Dept: INTERNAL MEDICINE | Facility: CLINIC | Age: 29
End: 2019-10-03

## 2019-10-03 ENCOUNTER — AMBULATORY - HEALTHEAST (OUTPATIENT)
Dept: ADDICTION MEDICINE | Facility: CLINIC | Age: 29
End: 2019-10-03

## 2019-10-03 DIAGNOSIS — Z00.00 ROUTINE GENERAL MEDICAL EXAMINATION AT A HEALTH CARE FACILITY: ICD-10-CM

## 2019-10-03 DIAGNOSIS — R30.0 DYSURIA: ICD-10-CM

## 2019-10-03 DIAGNOSIS — F50.89 ATYPICAL EATING DISORDER: ICD-10-CM

## 2019-10-03 DIAGNOSIS — F60.3 BORDERLINE PERSONALITY DISORDER (H): ICD-10-CM

## 2019-10-03 DIAGNOSIS — F33.1 MAJOR DEPRESSIVE DISORDER, RECURRENT EPISODE, MODERATE (H): ICD-10-CM

## 2019-10-03 DIAGNOSIS — F10.20 MODERATE ALCOHOL USE DISORDER (H): ICD-10-CM

## 2019-10-03 DIAGNOSIS — F33.1 MODERATE EPISODE OF RECURRENT MAJOR DEPRESSIVE DISORDER (H): ICD-10-CM

## 2019-10-03 DIAGNOSIS — F41.1 GAD (GENERALIZED ANXIETY DISORDER): ICD-10-CM

## 2019-10-03 LAB
ALBUMIN SERPL-MCNC: 3.9 G/DL (ref 3.5–5)
ALBUMIN UR-MCNC: NEGATIVE MG/DL
ALP SERPL-CCNC: 112 U/L (ref 45–120)
ALT SERPL W P-5'-P-CCNC: 30 U/L (ref 0–45)
ANION GAP SERPL CALCULATED.3IONS-SCNC: 8 MMOL/L (ref 5–18)
APPEARANCE UR: CLEAR
AST SERPL W P-5'-P-CCNC: 28 U/L (ref 0–40)
BILIRUB SERPL-MCNC: 0.9 MG/DL (ref 0–1)
BILIRUB UR QL STRIP: NEGATIVE
BUN SERPL-MCNC: 14 MG/DL (ref 8–22)
CALCIUM SERPL-MCNC: 9.5 MG/DL (ref 8.5–10.5)
CHLORIDE BLD-SCNC: 104 MMOL/L (ref 98–107)
CHOLEST SERPL-MCNC: 157 MG/DL
CO2 SERPL-SCNC: 27 MMOL/L (ref 22–31)
COLOR UR AUTO: YELLOW
CREAT SERPL-MCNC: 0.8 MG/DL (ref 0.7–1.3)
ERYTHROCYTE [DISTWIDTH] IN BLOOD BY AUTOMATED COUNT: 10.9 % (ref 11–14.5)
FASTING STATUS PATIENT QL REPORTED: YES
GFR SERPL CREATININE-BSD FRML MDRD: >60 ML/MIN/1.73M2
GLUCOSE BLD-MCNC: 92 MG/DL (ref 70–125)
GLUCOSE UR STRIP-MCNC: NEGATIVE MG/DL
HCT VFR BLD AUTO: 40.3 % (ref 40–54)
HDLC SERPL-MCNC: 77 MG/DL
HGB BLD-MCNC: 13.8 G/DL (ref 14–18)
HGB UR QL STRIP: NEGATIVE
HIV 1+2 AB+HIV1 P24 AG SERPL QL IA: NEGATIVE
KETONES UR STRIP-MCNC: NEGATIVE MG/DL
LDLC SERPL CALC-MCNC: 69 MG/DL
LEUKOCYTE ESTERASE UR QL STRIP: NEGATIVE
MCH RBC QN AUTO: 33.7 PG (ref 27–34)
MCHC RBC AUTO-ENTMCNC: 34.3 G/DL (ref 32–36)
MCV RBC AUTO: 98 FL (ref 80–100)
NITRATE UR QL: NEGATIVE
PH UR STRIP: 6 [PH] (ref 5–8)
PLATELET # BLD AUTO: 371 THOU/UL (ref 140–440)
PMV BLD AUTO: 6.8 FL (ref 7–10)
POTASSIUM BLD-SCNC: 4.3 MMOL/L (ref 3.5–5)
PROT SERPL-MCNC: 7.3 G/DL (ref 6–8)
RBC # BLD AUTO: 4.11 MILL/UL (ref 4.4–6.2)
SODIUM SERPL-SCNC: 139 MMOL/L (ref 136–145)
SP GR UR STRIP: 1.01 (ref 1–1.03)
TRIGL SERPL-MCNC: 55 MG/DL
TSH SERPL DL<=0.005 MIU/L-ACNC: 0.91 UIU/ML (ref 0.3–5)
UROBILINOGEN UR STRIP-ACNC: NORMAL
WBC: 8.8 THOU/UL (ref 4–11)

## 2019-10-03 RX ORDER — LORAZEPAM 0.5 MG/1
1 TABLET ORAL EVERY 8 HOURS PRN
Refills: 3 | Status: ON HOLD | COMMUNITY
Start: 2019-09-30 | End: 2022-04-10

## 2019-10-03 ASSESSMENT — PATIENT HEALTH QUESTIONNAIRE - PHQ9: SUM OF ALL RESPONSES TO PHQ QUESTIONS 1-9: 16

## 2019-10-03 ASSESSMENT — MIFFLIN-ST. JEOR: SCORE: 1634.85

## 2019-10-04 LAB — HCV AB SERPL QL IA: NEGATIVE

## 2019-10-07 ENCOUNTER — COMMUNICATION - HEALTHEAST (OUTPATIENT)
Dept: ADDICTION MEDICINE | Facility: CLINIC | Age: 29
End: 2019-10-07

## 2019-10-08 ENCOUNTER — OFFICE VISIT - HEALTHEAST (OUTPATIENT)
Dept: ADDICTION MEDICINE | Facility: CLINIC | Age: 29
End: 2019-10-08

## 2019-10-08 ENCOUNTER — AMBULATORY - HEALTHEAST (OUTPATIENT)
Dept: ADDICTION MEDICINE | Facility: CLINIC | Age: 29
End: 2019-10-08

## 2019-10-08 DIAGNOSIS — F10.20 ALCOHOL USE DISORDER, MODERATE, DEPENDENCE (H): ICD-10-CM

## 2019-10-09 ENCOUNTER — OFFICE VISIT - HEALTHEAST (OUTPATIENT)
Dept: BEHAVIORAL HEALTH | Facility: CLINIC | Age: 29
End: 2019-10-09

## 2019-10-09 DIAGNOSIS — F10.20 MODERATE ALCOHOL USE DISORDER (H): ICD-10-CM

## 2019-10-09 DIAGNOSIS — F33.1 MAJOR DEPRESSIVE DISORDER, RECURRENT EPISODE, MODERATE (H): ICD-10-CM

## 2019-10-09 DIAGNOSIS — F41.1 GAD (GENERALIZED ANXIETY DISORDER): ICD-10-CM

## 2019-10-10 ENCOUNTER — COMMUNICATION - HEALTHEAST (OUTPATIENT)
Dept: ADDICTION MEDICINE | Facility: CLINIC | Age: 29
End: 2019-10-10

## 2019-10-11 ENCOUNTER — OFFICE VISIT - HEALTHEAST (OUTPATIENT)
Dept: BEHAVIORAL HEALTH | Facility: CLINIC | Age: 29
End: 2019-10-11

## 2019-10-11 DIAGNOSIS — F10.20 MODERATE ALCOHOL USE DISORDER (H): ICD-10-CM

## 2019-10-11 DIAGNOSIS — F33.1 MAJOR DEPRESSIVE DISORDER, RECURRENT EPISODE, MODERATE (H): ICD-10-CM

## 2019-10-11 DIAGNOSIS — F41.1 GAD (GENERALIZED ANXIETY DISORDER): ICD-10-CM

## 2019-10-15 ENCOUNTER — OFFICE VISIT - HEALTHEAST (OUTPATIENT)
Dept: ADDICTION MEDICINE | Facility: CLINIC | Age: 29
End: 2019-10-15

## 2019-10-15 ENCOUNTER — COMMUNICATION - HEALTHEAST (OUTPATIENT)
Dept: BEHAVIORAL HEALTH | Facility: HOSPITAL | Age: 29
End: 2019-10-15

## 2019-10-15 DIAGNOSIS — F10.20 ALCOHOL USE DISORDER, MODERATE, DEPENDENCE (H): ICD-10-CM

## 2019-10-16 ENCOUNTER — OFFICE VISIT - HEALTHEAST (OUTPATIENT)
Dept: BEHAVIORAL HEALTH | Facility: CLINIC | Age: 29
End: 2019-10-16

## 2019-10-16 DIAGNOSIS — F33.1 MAJOR DEPRESSIVE DISORDER, RECURRENT EPISODE, MODERATE (H): ICD-10-CM

## 2019-10-16 DIAGNOSIS — F10.20 MODERATE ALCOHOL USE DISORDER (H): ICD-10-CM

## 2019-10-16 DIAGNOSIS — F41.1 GAD (GENERALIZED ANXIETY DISORDER): ICD-10-CM

## 2019-10-18 ENCOUNTER — COMMUNICATION - HEALTHEAST (OUTPATIENT)
Dept: ADDICTION MEDICINE | Facility: CLINIC | Age: 29
End: 2019-10-18

## 2019-10-18 ENCOUNTER — AMBULATORY - HEALTHEAST (OUTPATIENT)
Dept: ADDICTION MEDICINE | Facility: CLINIC | Age: 29
End: 2019-10-18

## 2019-10-21 ENCOUNTER — COMMUNICATION - HEALTHEAST (OUTPATIENT)
Dept: ADDICTION MEDICINE | Facility: CLINIC | Age: 29
End: 2019-10-21

## 2019-10-22 ENCOUNTER — OFFICE VISIT - HEALTHEAST (OUTPATIENT)
Dept: ADDICTION MEDICINE | Facility: CLINIC | Age: 29
End: 2019-10-22

## 2019-10-22 DIAGNOSIS — F10.20 ALCOHOL USE DISORDER, MODERATE, DEPENDENCE (H): ICD-10-CM

## 2019-10-24 ENCOUNTER — COMMUNICATION - HEALTHEAST (OUTPATIENT)
Dept: ADDICTION MEDICINE | Facility: CLINIC | Age: 29
End: 2019-10-24

## 2019-10-24 ENCOUNTER — AMBULATORY - HEALTHEAST (OUTPATIENT)
Dept: ADDICTION MEDICINE | Facility: CLINIC | Age: 29
End: 2019-10-24

## 2019-10-25 ENCOUNTER — OFFICE VISIT - HEALTHEAST (OUTPATIENT)
Dept: BEHAVIORAL HEALTH | Facility: CLINIC | Age: 29
End: 2019-10-25

## 2019-10-25 ENCOUNTER — AMBULATORY - HEALTHEAST (OUTPATIENT)
Dept: BEHAVIORAL HEALTH | Facility: CLINIC | Age: 29
End: 2019-10-25

## 2019-10-25 DIAGNOSIS — F33.1 MAJOR DEPRESSIVE DISORDER, RECURRENT EPISODE, MODERATE (H): ICD-10-CM

## 2019-10-25 DIAGNOSIS — F10.20 MODERATE ALCOHOL USE DISORDER (H): ICD-10-CM

## 2019-10-25 DIAGNOSIS — F41.1 GAD (GENERALIZED ANXIETY DISORDER): ICD-10-CM

## 2019-10-28 ENCOUNTER — AMBULATORY - HEALTHEAST (OUTPATIENT)
Dept: ADDICTION MEDICINE | Facility: CLINIC | Age: 29
End: 2019-10-28

## 2019-10-29 ENCOUNTER — OFFICE VISIT - HEALTHEAST (OUTPATIENT)
Dept: ADDICTION MEDICINE | Facility: CLINIC | Age: 29
End: 2019-10-29

## 2019-10-29 DIAGNOSIS — F10.20 ALCOHOL USE DISORDER, MODERATE, DEPENDENCE (H): ICD-10-CM

## 2019-10-30 ENCOUNTER — COMMUNICATION - HEALTHEAST (OUTPATIENT)
Dept: ADDICTION MEDICINE | Facility: CLINIC | Age: 29
End: 2019-10-30

## 2019-10-30 ENCOUNTER — OFFICE VISIT - HEALTHEAST (OUTPATIENT)
Dept: BEHAVIORAL HEALTH | Facility: CLINIC | Age: 29
End: 2019-10-30

## 2019-10-30 DIAGNOSIS — F33.1 MAJOR DEPRESSIVE DISORDER, RECURRENT EPISODE, MODERATE (H): ICD-10-CM

## 2019-10-30 DIAGNOSIS — F41.1 GAD (GENERALIZED ANXIETY DISORDER): ICD-10-CM

## 2019-10-30 DIAGNOSIS — F10.20 MODERATE ALCOHOL USE DISORDER (H): ICD-10-CM

## 2019-10-31 ENCOUNTER — AMBULATORY - HEALTHEAST (OUTPATIENT)
Dept: ADDICTION MEDICINE | Facility: CLINIC | Age: 29
End: 2019-10-31

## 2019-11-01 ENCOUNTER — OFFICE VISIT - HEALTHEAST (OUTPATIENT)
Dept: INTERNAL MEDICINE | Facility: CLINIC | Age: 29
End: 2019-11-01

## 2019-11-01 ENCOUNTER — OFFICE VISIT - HEALTHEAST (OUTPATIENT)
Dept: BEHAVIORAL HEALTH | Facility: CLINIC | Age: 29
End: 2019-11-01

## 2019-11-01 ENCOUNTER — AMBULATORY - HEALTHEAST (OUTPATIENT)
Dept: ADDICTION MEDICINE | Facility: CLINIC | Age: 29
End: 2019-11-01

## 2019-11-01 ENCOUNTER — COMMUNICATION - HEALTHEAST (OUTPATIENT)
Dept: INTERNAL MEDICINE | Facility: CLINIC | Age: 29
End: 2019-11-01

## 2019-11-01 ENCOUNTER — AMBULATORY - HEALTHEAST (OUTPATIENT)
Dept: BEHAVIORAL HEALTH | Facility: CLINIC | Age: 29
End: 2019-11-01

## 2019-11-01 DIAGNOSIS — F50.89 ATYPICAL EATING DISORDER: ICD-10-CM

## 2019-11-01 DIAGNOSIS — F33.1 MAJOR DEPRESSIVE DISORDER, RECURRENT EPISODE, MODERATE (H): ICD-10-CM

## 2019-11-01 DIAGNOSIS — F10.20 MODERATE ALCOHOL USE DISORDER (H): ICD-10-CM

## 2019-11-01 DIAGNOSIS — F41.1 GAD (GENERALIZED ANXIETY DISORDER): ICD-10-CM

## 2019-11-01 RX ORDER — ZOLPIDEM TARTRATE 12.5 MG/1
TABLET, FILM COATED, EXTENDED RELEASE ORAL
Refills: 0 | Status: ON HOLD | COMMUNITY
Start: 2019-10-29 | End: 2022-04-10

## 2019-11-01 ASSESSMENT — MIFFLIN-ST. JEOR: SCORE: 1634.85

## 2019-11-04 ENCOUNTER — AMBULATORY - HEALTHEAST (OUTPATIENT)
Dept: ADDICTION MEDICINE | Facility: CLINIC | Age: 29
End: 2019-11-04

## 2019-11-05 ENCOUNTER — COMMUNICATION - HEALTHEAST (OUTPATIENT)
Dept: INTERNAL MEDICINE | Facility: CLINIC | Age: 29
End: 2019-11-05

## 2019-11-12 ENCOUNTER — AMBULATORY - HEALTHEAST (OUTPATIENT)
Dept: BEHAVIORAL HEALTH | Facility: CLINIC | Age: 29
End: 2019-11-12

## 2019-11-18 ENCOUNTER — COMMUNICATION - HEALTHEAST (OUTPATIENT)
Dept: ADDICTION MEDICINE | Facility: CLINIC | Age: 29
End: 2019-11-18

## 2019-12-05 ENCOUNTER — COMMUNICATION - HEALTHEAST (OUTPATIENT)
Dept: BEHAVIORAL HEALTH | Facility: CLINIC | Age: 29
End: 2019-12-05

## 2019-12-19 ENCOUNTER — COMMUNICATION - HEALTHEAST (OUTPATIENT)
Dept: BEHAVIORAL HEALTH | Facility: CLINIC | Age: 29
End: 2019-12-19

## 2019-12-31 ENCOUNTER — OFFICE VISIT - HEALTHEAST (OUTPATIENT)
Dept: BEHAVIORAL HEALTH | Facility: CLINIC | Age: 29
End: 2019-12-31

## 2019-12-31 DIAGNOSIS — F10.20 MODERATE ALCOHOL USE DISORDER (H): ICD-10-CM

## 2019-12-31 DIAGNOSIS — F33.1 MAJOR DEPRESSIVE DISORDER, RECURRENT EPISODE, MODERATE (H): ICD-10-CM

## 2019-12-31 DIAGNOSIS — F41.1 GAD (GENERALIZED ANXIETY DISORDER): ICD-10-CM

## 2020-01-21 ENCOUNTER — OFFICE VISIT - HEALTHEAST (OUTPATIENT)
Dept: BEHAVIORAL HEALTH | Facility: CLINIC | Age: 30
End: 2020-01-21

## 2020-01-21 ENCOUNTER — AMBULATORY - HEALTHEAST (OUTPATIENT)
Dept: BEHAVIORAL HEALTH | Facility: CLINIC | Age: 30
End: 2020-01-21

## 2020-01-21 DIAGNOSIS — F33.1 MAJOR DEPRESSIVE DISORDER, RECURRENT EPISODE, MODERATE (H): ICD-10-CM

## 2020-01-21 DIAGNOSIS — F10.20 MODERATE ALCOHOL USE DISORDER (H): ICD-10-CM

## 2020-01-21 DIAGNOSIS — F41.1 GAD (GENERALIZED ANXIETY DISORDER): ICD-10-CM

## 2020-02-04 ENCOUNTER — OFFICE VISIT - HEALTHEAST (OUTPATIENT)
Dept: BEHAVIORAL HEALTH | Facility: CLINIC | Age: 30
End: 2020-02-04

## 2020-02-04 DIAGNOSIS — F33.1 MAJOR DEPRESSIVE DISORDER, RECURRENT EPISODE, MODERATE (H): ICD-10-CM

## 2020-02-04 DIAGNOSIS — F10.20 MODERATE ALCOHOL USE DISORDER (H): ICD-10-CM

## 2020-02-04 DIAGNOSIS — F41.1 GAD (GENERALIZED ANXIETY DISORDER): ICD-10-CM

## 2020-02-25 ENCOUNTER — OFFICE VISIT - HEALTHEAST (OUTPATIENT)
Dept: INTERNAL MEDICINE | Facility: CLINIC | Age: 30
End: 2020-02-25

## 2020-02-25 DIAGNOSIS — Z72.0 TOBACCO ABUSE: ICD-10-CM

## 2020-02-25 DIAGNOSIS — F41.1 GENERALIZED ANXIETY DISORDER: ICD-10-CM

## 2020-02-25 DIAGNOSIS — F50.89 ATYPICAL EATING DISORDER: ICD-10-CM

## 2020-02-25 DIAGNOSIS — F33.41 RECURRENT MAJOR DEPRESSION IN PARTIAL REMISSION (H): ICD-10-CM

## 2020-02-25 RX ORDER — CLONAZEPAM 1 MG/1
TABLET ORAL
Status: ON HOLD | COMMUNITY
Start: 2020-01-27 | End: 2022-04-10

## 2020-02-25 RX ORDER — NICOTINE 21 MG/24HR
1 PATCH, TRANSDERMAL 24 HOURS TRANSDERMAL EVERY 24 HOURS
Qty: 30 PATCH | Refills: 1 | Status: ON HOLD | OUTPATIENT
Start: 2020-02-25 | End: 2022-04-10

## 2020-02-25 ASSESSMENT — MIFFLIN-ST. JEOR: SCORE: 1789.07

## 2020-03-03 ENCOUNTER — OFFICE VISIT - HEALTHEAST (OUTPATIENT)
Dept: BEHAVIORAL HEALTH | Facility: CLINIC | Age: 30
End: 2020-03-03

## 2020-03-03 DIAGNOSIS — F41.1 GAD (GENERALIZED ANXIETY DISORDER): ICD-10-CM

## 2020-03-03 DIAGNOSIS — F10.20 MODERATE ALCOHOL USE DISORDER (H): ICD-10-CM

## 2020-03-03 DIAGNOSIS — F33.1 MAJOR DEPRESSIVE DISORDER, RECURRENT EPISODE, MODERATE (H): ICD-10-CM

## 2020-03-10 ENCOUNTER — COMMUNICATION - HEALTHEAST (OUTPATIENT)
Dept: BEHAVIORAL HEALTH | Facility: CLINIC | Age: 30
End: 2020-03-10

## 2020-03-17 ENCOUNTER — AMBULATORY - HEALTHEAST (OUTPATIENT)
Dept: BEHAVIORAL HEALTH | Facility: CLINIC | Age: 30
End: 2020-03-17

## 2020-03-18 ENCOUNTER — AMBULATORY - HEALTHEAST (OUTPATIENT)
Dept: BEHAVIORAL HEALTH | Facility: CLINIC | Age: 30
End: 2020-03-18

## 2020-04-21 ENCOUNTER — COMMUNICATION - HEALTHEAST (OUTPATIENT)
Dept: INTERNAL MEDICINE | Facility: CLINIC | Age: 30
End: 2020-04-21

## 2020-04-21 ENCOUNTER — COMMUNICATION - HEALTHEAST (OUTPATIENT)
Dept: NURSING | Facility: CLINIC | Age: 30
End: 2020-04-21

## 2020-04-21 ENCOUNTER — AMBULATORY - HEALTHEAST (OUTPATIENT)
Dept: INTERNAL MEDICINE | Facility: CLINIC | Age: 30
End: 2020-04-21

## 2020-04-21 ENCOUNTER — COMMUNICATION - HEALTHEAST (OUTPATIENT)
Dept: BEHAVIORAL HEALTH | Facility: CLINIC | Age: 30
End: 2020-04-21

## 2020-04-21 DIAGNOSIS — Z79.899 MEDICATION MANAGEMENT: ICD-10-CM

## 2020-04-24 ENCOUNTER — COMMUNICATION - HEALTHEAST (OUTPATIENT)
Dept: NURSING | Facility: CLINIC | Age: 30
End: 2020-04-24

## 2020-04-28 ENCOUNTER — COMMUNICATION - HEALTHEAST (OUTPATIENT)
Dept: BEHAVIORAL HEALTH | Facility: CLINIC | Age: 30
End: 2020-04-28

## 2021-05-26 ASSESSMENT — PATIENT HEALTH QUESTIONNAIRE - PHQ9: SUM OF ALL RESPONSES TO PHQ QUESTIONS 1-9: 16

## 2021-05-29 NOTE — PROGRESS NOTES
Cong Tuttle attended 3 hours of group therapy today. Today was patient's first day of group. Patient introduced himself and was welcomed by group.     Total group size of 10.    6/14/2019 2:11 PM Elinor Pandya

## 2021-05-29 NOTE — PROGRESS NOTES
Authorization request form faxed to [employer] 6/13/19.     [facility] Gay [facility]   6/13/2019  12:30 PM

## 2021-05-29 NOTE — PROGRESS NOTES
"[facility]  SUICIDE SAFETY PREVENTION  PLAN  Patient's Name: Cong Tuttle  MRN:[id number]    Date of Service: 6/21/2019  Provider: EVAN Blunt, VAELNCIA          Step 1: Warning signs:  1. Thought process---over thinking the past or future.  2. Not feeling good enough  3. Thinking about suicide plan, thinking about writing a note    Step 2: Internal coping strategies -   Things I can do to take my mind off my problems without contacting another person:  1.  Attempt to \"ride the wave\"   2. Thinking through how it would impact others  3. TV  4.         podcasts     Step 3: People and social settings that provide distraction:    1. Name: Mom and Dad                                                         2. Name: Ko Nguyễn Emily, Andrew  3          Name: Sponsor                                      Step 4: People whom I can ask for help:   1. Name: Mom                                                             2. Name: Ko Nguyễn Emily, Andrew                                                           Step 5:Professionals or agencies I can contact during a crisis:     1. Clinician Name: Elinor Pandya                                         Phone: [phone]    2. Clinician Name: Dr. Lombardo                                              Phone:    Clinician Pager or Emergency Contact #:     3. [facility] Adult Mental Health:    Urgent Care Services  Address: [address] [place] [address]. [place] [address]   Urgent Care Services  Phone:  [phone]      4.. Suicide Prevention Crisis Connection: [phone]    Step 6: Making the environment safe:  1.  Getting rid of belts and knives  2. Keeping extra medication at parents' house.     Safety Plan Treatment Manual to Reduce Suicide Risk:  (Julian & Mika, 2008).     By signing this plan, I agree to follow this plan, if I am unable to reach someone to help, I am willing to contact 911 or present to emergency medical services       Patient " Signature:_____________________________            Date:_____________

## 2021-05-29 NOTE — PROGRESS NOTES
"Addiction Services - Initial Services Plan     Patient  Name: Cong Tuttle  MRN: [id number]   : 1990  Admit Date: 19       Patient describes their immediate need: To learn recovery skills to prevent relapse. Patient denies all other immediate needs.     Are there any immediate Safety Needs such as (physical, stability, mobility):  Pt is able to get medical care as needed. Pt denies immediate concerns.     Immediate Health Needs and Plan:   Continue care with psychiatrist.  Seek medical attention as necessary.   Continue taking all medications as prescribed.      Vulnerable Adult: No     Issues to be addressed in the first sessions:   Self-esteem  Learning healthy coping skills  Mindfulness  Coming to terms with where I'm at now with my life.   Radical acceptance.   Being more social.   Hobbies and sober things to do with my time.     Patient strengths and needs:   Strengths identified as \"I'm generally kind, willing to help, empathetic, and a good runner.\"   Needs identified as \"judging myself, being social, maintaining stable mental health, being willing to do what is recommended, spending.\"     Plan for patient for time between intake and completion of the treatment plan:   Attend all group therapy sessions as directed, complete all written and oral assignments as directed, and remain clean and sober. A relapse, if any, must be reported to staff immediately in order to ensure you are receiving the proper level of care. If you cannot attend a group therapy session you must call contact information provided in intake folder and leave a message before or during group hours.       Vulnerable Adult Review   [X] Review of the Facility Abuse Prevention plan was reviewed with the patient   [X] No Individual Abuse Plan is necessary   [ ] In addition to the Facility Abuse Prevention plan, an Individual Abuse Plan will be put in place       Staff Name/Title: VALENCIA Kumar   Date: 2019  Time: " 10:31 AM

## 2021-05-29 NOTE — PROGRESS NOTES
Cong Tuttle attended 3 hours of group therapy today.    Total group size of 10.    6/19/2019 12:28 PM Elinor Pandya

## 2021-05-29 NOTE — PROGRESS NOTES
Weekly Progress Note  Cong Tuttle  1990  [id number]      D) Pt attended 3/3 groups this week with no absences. Patient attended 1 individual sessions this week on 6/20/10  A) Staff facilitated groups and reviewed tx progress. Assessed for [facility]. R) No VAP needed at this time.     Any significant events, defines as events that impact patients relationship with others inside and outside of treatment: Yes: patient was released from hospital on 6/10/19.   Indicate any changes or monitoring of physical or mental health problems: Yes: patient reported SIB on 6/19/19, and passive SI this week.   Indicate involvement by any outside supports: Yes: patient has a sponsor, psychiatrist in [place].   IAPP reviewed and modified as needed: NA    Pt working on the following dimensions:  Dimension #1 - Withdrawal Potential - Risk 0. Patient reports last use of alcohol on 6/5/19 at intake, most recent pattern of use has been 3-9 drinks 2-4x per week. Patient does not report or display any withdrawal symptoms at this time.   Specific goals from treatment plan addressed this week:  Patient reported no use this week. Patient was not requested to complete a UA on 6/19/19 which was positive for benzodiazepines, patient is currently prescribed klonopin. Results are still pending for EtS/Etg.   Effectiveness of strategies:  Strategies appear effective.   Dimension #2 - Biomedical - Risk 0. Patient reports no current health concerns, but does report restricting his eating. Patient has medical insurance and is able to access medical care as needed.   Specific goals from treatment plan addressed this week:  Patient discussed restrictive eating and compulsive exercising with counselor, discussed goal for next week to start eating small amounts of food for breakfast. Patient was provided information regarding [facility].   Effectiveness of strategies:  Strategies appear effective.   Dimension #3 - Emotional/Behavioral/Cognitive - Risk  2. Patient reports mental health diagnoses of major depressive disorder, generalized anxiety disorder, and borderline personality disorder. Patient reports that he does currently have psychiatry services that he regularly attends (weekly currently). Patient denies any other current mental health services, but does verbalize a desire to obtain therapy services. Patient endorses current mental health medications. Patient was recently discharged from a stay on a mental health unit due to suicidal ideation. Patient has a long history of struggling with suicidal ideation and attempts. Patient endorses active struggles with food restriction, as well as self-harm. Patient states that he greatly struggles with impulse control at this time, and concern of engaging more in self harm when not drinking. Patient denies any current suicidal or homicidal thoughts or plans. Patient is oriented x4.   Active interventions to stabilize mental health symptoms:  Patient reports taking medications as prescribed. Counselor checking in with patient at each appointment regarding SIB and SI, patient signed suicide safety prevention plan on 6/21/19. Patient reported passive SI this week, no intent.   Specific goals from treatment plan addressed this week:  Patient discussed SIB on 6/18/19, by burning with a cigarette. Patient reported urges to self-harm on 6/20/19, but did not act on urges. Counselor and patient discussed ways to make his environment safer, including leaving knives and belts at his parents house and only have the medication that he need for the week. Patient was provided worksheets on distress tolerance techniques.   Effectiveness of strategies:  Strategies appear effective.   Dimension #4 - Readiness for Change - Risk 0. Patient verbalizes that he feels that he is here on his own regard, despite being recommended by his psychiatrist. Patient does verbalize that he believes his use is problematic, and that he wants help to  change at this time. Patient verbalizes willingness to follow all recommendations made for him while in treatment. Patient appears genuinely motivated for treatment and change at this time.  Specific goals from treatment plan addressed this week:  Patient attended 3/3 groups this week and scheduled 1:1.   Effectiveness of strategies:  Strategies appear effective.   Dimension #5 - Relapse Potential - Risk 3. Patient reports regular use of alcohol up until 6/5/19, and so he therefore may currently lack the necessary coping skills to help him prevent relapse at this time. He indicated that he struggles with impulse control which could indicate an increased risk of relapse at this time. Patient does report some past treatment experience and a history of periods of sustained sobriety. He therefore may have some knowledge of the skills needed to maintain sobriety, however it does not appear that he has been utilizing those skills in the past 6 months. Patient's lack of current skills, mental health, and impulse control issues appear to indicate an increased risk of relapse at this time.   Specific goals from treatment plan addressed this week:  Patient reported having an urge for DXM earlier in the week, but did not use alcohol or any substances. Patient reported the weekend was the first time he had been sober at his parents' house in some time, reported keeping busy and calling his sponsor every day.    Effectiveness of strategies:  Strategies appear effective.   Dimension #6 - Recovery Environment - Risk 2. Patient currently works full-time, and do he does appear to have some structure for his daily life. Patient struggled to identify sober hobbies, so he does appear to lack meaningful activity outside of work at this time. Patient states that he currently has a stable place to live, however it is not always the most supportive as he sometimes isolates. Patient indicated peer support for sobriety, and that he has been  attending support groups, so he does have some support for his sobriety. He states that his family does not know about his use currently, and that they would not be supportive of him if they were made aware. Patient denies any current legal involvement.   Specific goals from treatment plan addressed this week:  Patient reported some difficulty navigating how to set boundaries with work while he is in treatment, reported missing a meeting earlier in the week due to working late. Patient discussed that he has some interest in checking out different things in the [facility], but has difficulty following through on doing these things. Patient reports talking to his sponsor daily.   Effectiveness of strategies:  Strategies appear effective.   T) Treatment plan updated: no  Patient notified and in agreement: N/A.  Patient educated on Change. Patient has completed 15 of 90 program hours at this time. Projected discharge date is 9/20/19. Current discharge plan is phase III/ program.     EVAN Blunt, [facility]  6/22/2019, 12:46 PM       Weekly Educational Topics Date   1. Dual Diagnoses, week 1    2. Dual Diagnoses, week 2    3. Stress Management    4. Feelings/Emotions    5. Thinking 6/14   6. Change 6/17, 6/19, 6/21   7. Recovery Support    8. Relationships/Communication    9. Addiction 101    10. Relapse Prevention    11. Grief and Loss    12. Strengths    13. Wellness    Mindfulness  6/14, 6/21

## 2021-05-29 NOTE — PROGRESS NOTES
Cong Tuttle attended 3 hours of group therapy today. Patient reported relapse with alcohol on 9/21/19, and reported self-harm urges on 9/23/19, but did not indicate any SIB.     Total group size of 10.    6/24/2019 1:50 PM Elinor Pandya

## 2021-05-29 NOTE — PROGRESS NOTES
Cong Tuttle attended 3 hours of group therapy today.    Total group size of 7.    6/17/2019 12:07 PM Elinor Pandya

## 2021-05-29 NOTE — PROGRESS NOTES
Cong Tuttle attended 3 hours of group therapy today. Counselor met with patient briefly during group to develop suicide safety plan, patient in agreement to utilize plan when experiencing SI. Patient reported urges for SIB the night prior but did not act on them, reports passive SI.     Total group size of 8.    6/21/2019 12:13 PM Elinor Pandya

## 2021-05-29 NOTE — PROGRESS NOTES
Weekly Progress Note  Cong Tuttle  1990  [id number]      D) Pt attended 1/1 groups this week with no absences. Patient attended 2 individual sessions this week to complete intake and brief DA.   A) Staff facilitated groups and reviewed tx progress. Assessed for [facility]. R) No VAP needed at this time.     Any significant events, defines as events that impact patients relationship with others inside and outside of treatment: Yes: patient was released from hospital on 6/10/19.   Indicate any changes or monitoring of physical or mental health problems: Yes: patient reported SIB on 6/12/19, did not self harm on 6/14/19.   Indicate involvement by any outside supports: Yes: patient has a sponsor, psychiatrist in [place].   IAPP reviewed and modified as needed: NA    Pt working on the following dimensions:  Dimension #1 - Withdrawal Potential - Risk 0. Patient reports last use of alcohol on 6/5/19 at intake, most recent pattern of use has been 3-9 drinks 2-4x per week. Patient does not report or display any withdrawal symptoms at this time.   Specific goals from treatment plan addressed this week:  Patient reported no use this week. Patient was not requested to complete a UA this week.   Effectiveness of strategies:  Strategies appear effective.   Dimension #2 - Biomedical - Risk 0. Patient reports no current health concerns, but does report restricting his eating. Patient has medical insurance and is able to access medical care as needed.   Specific goals from treatment plan addressed this week:  Patient discussed eating and exercise habits with counselor. Patient reports no changes to physical health this week.   Effectiveness of strategies:  Strategies appear effective.   Dimension #3 - Emotional/Behavioral/Cognitive - Risk 2. Patient reports mental health diagnoses of major depressive disorder, generalized anxiety disorder, and borderline personality disorder. Patient reports that he does currently have psychiatry  services that he regularly attends (weekly currently). Patient denies any other current mental health services, but does verbalize a desire to obtain therapy services. Patient endorses current mental health medications. Patient was recently discharged from a stay on a mental health unit due to suicidal ideation. Patient has a long history of struggling with suicidal ideation and attempts. Patient endorses active struggles with food restriction, as well as self-harm. Patient states that he greatly struggles with impulse control at this time, and concern of engaging more in self harm when not drinking. Patient denies any current suicidal or homicidal thoughts or plans. Patient is oriented x4.   Active interventions to stabilize mental health symptoms:  Patient reports taking medications as prescribed. Counselor checking in with patient at each appointment regarding SIB and SI.   Specific goals from treatment plan addressed this week:  Patient met with counselor to complete brief DA. Counselor and patient discussed safety plan, and patient to fill out plan over the weekend. Patient reported SIB by cigarette burns on 6/12/19, did not report any SIB on 6/13/19 or 6/14/19. Patient reports some passive SI, with a plan and means but denies intent, counselor and patient discussed staying aware of the intensity of these feelings. Counselor assisted patient in scheduling therapy appointment with SYLVIA Albarado on 6/27/19. Patient was presented bridge burning information to look over.   Effectiveness of strategies:  Strategies appear effective.   Dimension #4 - Readiness for Change - Risk 0. Patient verbalizes that he feels that he is here on his own regard, despite being recommended by his psychiatrist. Patient does verbalize that he believes his use is problematic, and that he wants help to change at this time. Patient verbalizes willingness to follow all recommendations made for him while in treatment. Patient appears  genuinely motivated for treatment and change at this time.  Specific goals from treatment plan addressed this week:  Patient attended both individual appointments and 1/1 groups this week.   Effectiveness of strategies:  Strategies appear effective.   Dimension #5 - Relapse Potential - Risk 3. Patient reports regular use of alcohol up until 6/5/19, and so he therefore may currently lack the necessary coping skills to help him prevent relapse at this time. He indicated that he struggles with impulse control which could indicate an increased risk of relapse at this time. Patient does report some past treatment experience and a history of periods of sustained sobriety. He therefore may have some knowledge of the skills needed to maintain sobriety, however it does not appear that he has been utilizing those skills in the past 6 months. Patient's lack of current skills, mental health, and impulse control issues appear to indicate an increased risk of relapse at this time.   Specific goals from treatment plan addressed this week:  Patient reported some urges when he came home from the hospital due to having alcohol in his apartment, but was able to dump it out, and did not use alcohol. Patient expressed some concerns about going to his parents house sober, but planned to call sponsor each day.   Effectiveness of strategies:  Strategies appear effective.   Dimension #6 - Recovery Environment - Risk 2. Patient currently works full-time, and do he does appear to have some structure for his daily life. Patient struggled to identify sober hobbies, so he does appear to lack meaningful activity outside of work at this time. Patient states that he currently has a stable place to live, however it is not always the most supportive as he sometimes isolates. Patient indicated peer support for sobriety, and that he has been attending support groups, so he does have some support for his sobriety. He states that his family does not know  about his use currently, and that they would not be supportive of him if they were made aware. Patient denies any current legal involvement.   Specific goals from treatment plan addressed this week:  Patient reported working this week, talked to sponsor and attended a meeting. Patient expressed some concerns about going to [place] this weekend, has plan to call sponsor.   Effectiveness of strategies:  Strategies appear effective.   T) Treatment plan updated: yes.  Patient notified and in agreement: Yes.  Patient educated on Thinking. Patient has completed 5 of 90 program hours at this time. Projected discharge date is 9/20/19. Current discharge plan is phase III/MH program.     EVAN Blunt, [facility]  6/14/2019, 4:47 PM       Weekly Educational Topics Date   1. Dual Diagnoses, week 1    2. Dual Diagnoses, week 2    3. Stress Management    4. Feelings/Emotions    5. Thinking 6/14   6. Change    7. Recovery Support    8. Relationships/Communication    9. Addiction 101    10. Relapse Prevention    11. Grief and Loss    12. Strengths    13. Wellness    Mindfulness  6/14

## 2021-05-29 NOTE — PROGRESS NOTES
Individual Treatment Plan    Patient  Name: Cong Tuttle  MRN: [id number]   : 1990  Admit Date: 19  Date of Initial Service Plan: 19  Tentative Discharge Date: 19    Diagnosis: Alcohol Use Disorder, Moderate (F10.20)    Counselor: VALENCIA Kumar      Dimension 1: Acute Intoxication/Withdrawal Potential, Risk level: 0    Problem Statement from Comprehensive Assessment:  Patient reports weekly alcohol use, with his last use being on 19. Patient did recently get discharged from a mental health unit, and so was monitored for potential withdrawal while there. Patient denies any current withdrawal symptoms or concerns. Patient shows no physical signs or symptoms of intoxication or withdrawal. Patient may be at some risk of withdrawal if he discontinues his anti-anxiety medication. Patient does not appear to be at risk of severe withdrawal at this time. Patient is oriented x4.       Problem: Patient's last use of alcohol on 19.  Goal: Patient to maintain abstinence throughout outpatient treatment.   Must be reached to complete treatment? Yes  Methods/Strategies (must include amount and frequency):   1. Patient to report any substance/alcohol use to counselor to determine if any changes need to be made to address withdrawal symptoms.   2. Patient to complete any requested UAs.   Target Date: 19  Completion Date:        Dimension 2: Biomedical Conditions/Complications, Risk level: 0    Problem Statement from Comprehensive Assessment:  Patient denies any current health issues or concerns. He does not currently have a PCP, but does have insurance and so appears able to get his medical needs met and addressed as necessary. Patient denies any immediate medical needs or concerns. He appears to be in adequate physical health and functioning at this time.       Problem: Patient reports good physical health.   Goal: Patient to maintain stable health throughout outpatient treatment.    Must be reached to complete treatment? No  Methods/Strategies (must include amount and frequency):   1. Patient to report any changes to physical health to counselor.   2. Patient to attend all scheduled doctor s appointments.   3. Patient to take medications as prescribed.   Target Date: 9/20/19  Completion Date:     Problem: Patient reports current tobacco use.   Goal: Patient to receive information about smoking cessation.   Must be reached to complete treatment? No  Methods/Strategies (must include amount and frequency):   1. Staff to provide patient with nicotine cessation information and help on how to quit use.   2. Patient to report any progress on stopping nicotine use to staff.   Target Date: 9/20/19  Completion Date:     Dimension 3: Emotional/Behavioral/Cognitive, Risk level: 2    Problem Statement from Comprehensive Assessment:  Patient reports mental health diagnoses of major depressive disorder, generalized anxiety disorder, and borderline personality disorder. Patient reports that he does currently have psychiatry services that he regularly attends (weekly currently). Patient denies any other current mental health services, but does verbalize a desire to obtain therapy services. Patient endorses current mental health medications. Patient was recently discharged from a stay on a mental health unit due to suicidal ideation. Patient has a long history of struggling with suicidal ideation and attempts. Patient endorses active struggles with food restriction, as well as self-harm. Patient states that he greatly struggles with impulse control at this time, and concern of engaging more in self harm when not drinking. Patient denies any current suicidal or homicidal thoughts or plans. Patient is oriented x4.       Problem: Patient reports mental health diagnoses of depression, anxiety and borderline personality disorder.   Goal: Patient to manage mental health.   Must be reached to complete treatment?  No  Methods/Strategies (must include amount and frequency):   1. Patient to begin using coping skills learned in therapeutic group (such as grounding, breathing, thought challenging, mindfulness, etc), and share in daily check-in any benefits or challenges that you experience using these skills.  2. Patient to meet with counselor or other  staff to complete a diagnostic assessment for mental health. Patient to discuss with primary counselor potential referrals.  3. Patient to attend all psychiatry appointments.   4. Patient to take all medications as prescribed.   Target Date: 9/20/19  Completion Date:     Problem: Patient struggles with suicidal ideation and self harm.   Goal: Patient to manage urges to self harm.   Must be reached to complete treatment? No  Methods/Strategies (must include amount and frequency):   1. Patient to develop a safety plan if necessary with primary counselor, and report to counselor immediately of any self harm or suicidal thoughts.   2. Patient to work with counselor on urges to restrict food.   3. Patient to seek medical attention if necessary.   Target Date: 9/20/19  Completion Date:     Dimension 4: Readiness to Change, Risk level 0    Problem Statement from Comprehensive Assessment:  Patient verbalizes that he feels that he is here on his own regard, despite being recommended by his psychiatrist. Patient does verbalize that he believes his use is problematic, and that he wants help to change at this time. Patient verbalizes willingness to follow all recommendations made for him while in treatment. Patient appears genuinely motivated for treatment and change at this time. Patient was calm, cooperative, and appropriately behaved throughout this appointment.       Problem: Patient reports motivation for treatment and sobriety.   Goal: Patient to increase motivation towards recovery by participating in outpatient programming.   Must be reached to complete treatment?  Yes  Methods/Strategies (must include amount and frequency):   1. Patient to attend 3, 3-hour groups per week and all scheduled 1:1s.  2. Patient will contact staff if unable to attend (Joelle [phone], Elinor [phone]).  3. Patient will identify weekly and daily goals to help increase motivation and engagement in recovery.  Target Date: 9/20/19  Completion Date:     Dimension 5: Relapse/Continued Use/Continued Problem Potential, Risk level: 3    Problem Statement from Comprehensive Assessment:  Patient reports regular use of alcohol up until 6/5/19, and so he therefore may currently lack the necessary coping skills to help him prevent relapse at this time. He indicated that he struggles with impulse control which could indicate an increased risk of relapse at this time. Patient does report some past treatment experience and a history of periods of sustained sobriety. He therefore may have some knowledge of the skills needed to maintain sobriety, however it does not appear that he has been utilizing those skills in the past 6 months. Patient's lack of current skills, mental health, and impulse control issues appear to indicate an increased risk of relapse at this time.       Problem: Patient lacks coping skills to prevent relapse.   Goal: Patient to gain skills to prevent relapse.   Must be reached to complete treatment? Yes  Methods/Strategies (must include amount and frequency):   1. Patient to share in daily check-in any urges and addictive thinking to better understand his pattern of use and to prevent relapse in the future.  2. Patient to identify at least 5 personal triggers and high-risk situations for relapse.  3. Patient to learn and implement at least 5 personal coping strategies to manage urges to use.  4. Patient to identify 5 beliefs and/or behaviors that have blocked him from staying sober in the past. Identify at least 5 alternative thoughts and behaviors more conducive to recovery  Target  Date: 9/20/19  Completion Date:       Dimension 6: Recovery Environment, Risk level: 2    Problem Statement from Comprehensive Assessment:  Patient currently works full-time, and do he does appear to have some structure for his daily life. Patient struggled to identify sober hobbies, so he does appear to lack meaningful activity outside of work at this time. Patient states that he currently has a stable place to live, however it is not always the most supportive as he sometimes isolates. Patient indicated peer support for sobriety, and that he has been attending support groups, so he does have some support for his sobriety. He states that his family does not know about his use currently, and that they would not be supportive of him if they were made aware. Patient denies any current legal involvement.       Problem: Lack of sober support   Goal: Patient to expand sober support.  Must be reached to complete treatment? yes  Methods/Strategies (must include amount and frequency):   1. Explore and identify at least 2 sober support meetings to regularly attend.  2. Patient to identify at least 1 person from a meeting that he would like to get to know better. Patient to discuss with counselor ways that he can work to build friendship with this person.  Target Date: 9/20/19  Completion Date:     Problem: Patient lacks sober activity.  Goal: Patient to develop sober hobbies.   Must be reached to complete treatment? Yes  Methods/Strategies (must include amount and frequency):   1. Identify activities that peak interest. Maintain consistent attendance.  2. Patient to identify weekly and daily activities he does to maintain his recovery. Share with counselor.    Target Date: 9/20/19  Completion Date:       Resources  Resources to which the patient is being referred for problems when problems are to be addressed concurrently by another provider: Psychiatry, and all other providers as identified throughout treatment.         By  signing this document, I am acknowledging that I was actively and directly involved in the development of my treatment plan.         Patient Signature:_____________________________            Date:_____________     Staff Signature:   Shena Gay                                                          Date: 6/13/2019, 9:30 AM

## 2021-05-29 NOTE — PROGRESS NOTES
WHITNEY. Counselor met with patient for 70 minutes to discuss treatment progress. A. Counselor inquired about patient's weekend with his parents and how his parent's alcohol use impact his recovery. Counselor encouraged patient to consider what he needs in terms of support for his recovery and suggested that the patient consider sober housing. Counselor suggested goal of eventually staying in the [facility] over the weekend but making sure that there is enough structure and support. Counselor inquired about patient's eating and exercise habits, provided patient with [facility] contact information and discussed attempting to make small changes to his habits. Counselor inquired about SI and SIB, provided patient with information on distress tolerance skills, encouraged patient to attempt to use these skills as urges to self-harm increase. R. Patient reported that his parents tend to drink heavily on the weekend, reports he has been going home almost all weekend since moving to the [facility] and the [facility] has he had drank heavily on the weekend he has stayed in [place], agreed with eventual goal of staying in the [facility]. Patient reported some hesitation about a sober house, but reported that he would think about it. Patient reported that he goes running every day and will restrict his eating to the evening, and often feels that he has to earn calories. Patient reported that he thinks he is the contemplation or preparation stage of making changes to his eating/exercising habits, reported that he would be wiling to try increasing his eating slightly. Patient reported that he burned himself with a cigarette on Tuesday after feeling rejected. Patient reported that he is willing to be more mindful of urges to self-harm and attempt to use distress tolerance skills. Patient reported minimal passive SI. T. Patient to continue with phase I, meeting weekly with primary counselor.     Patient treatment was reviewed. Changes were not made.  Patient was actively and directly involved in the treatment plan review and updates.     EVAN Blunt, [facility]  6/20/2019, 5:11 PM

## 2021-05-29 NOTE — PROGRESS NOTES
"Intake Note:     D) Cong Tuttle is a 28 y.o.  single White or  male who is referred to [facility] via Psychiatrist with funding from [employer]. Patient orientated x 3. Patient meets criteria for Alcohol Use Disorder, Moderate (F10.20).  Patient appears appropriate for [facility].   A) Met with patient for 50 minutes.  Completed intake assessment and preliminary paperwork. Patient was given and explained counselor & supervisor license number and contact info, Patient Bill of Rights, program rules/regulations, Program Abuse Prevention Plan, confidentiality & HIPPA policies, grievance procedure, presented ROIs, TB & HIV/AIDS policies & resources, and Vulnerable Adult policy.   Conducted Vulnerable Adult Assessment.   R)No special Vulnerable Adult needed at this time.  Patient signed and agreed to counselor & supervisor license number and contact info., Patient Bill of Rights, group rules/regulations, Program Abuse Prevention Plan, confidentiality & HIPPA policies, grievance procedure,  ROIs, TB & HIV/AIDS policies & resources, and Vulnerable Adult policy. Patient scored High Risk on C-SSRS screen. Patient denied suicidal ideation/intent/plan/means at this time.     Opioid Use Disorder: No   Provided \"Options for Opioid Treatment in [place] and Overdose Prevention\" No     Dimension #1 - Withdrawal Potential - Risk 0. Patient reports weekly alcohol use, with his last use being on 6/5/19. Patient did recently get discharged from a mental health unit, and so was monitored for potential withdrawal while there. Patient denies any current withdrawal symptoms or concerns. Patient shows no physical signs or symptoms of intoxication or withdrawal. Patient may be at some risk of withdrawal if he discontinues his anti-anxiety medication. Patient does not appear to be at risk of severe withdrawal at this time. Patient is oriented x4.     Dimension #2 - Biomedical - Risk 0. Patient denies any current health issues or " concerns. He does not currently have a PCP, but does have insurance and so appears able to get his medical needs met and addressed as necessary. Patient denies any immediate medical needs or concerns. He appears to be in adequate physical health and functioning at this time.     Dimension #3 - Emotional , Behavioral and Cognitive - Risk 2. Patient reports mental health diagnoses of major depressive disorder, generalized anxiety disorder, and borderline personality disorder. Patient reports that he does currently have psychiatry services that he regularly attends (weekly currently). Patient denies any other current mental health services, but does verbalize a desire to obtain therapy services. Patient endorses current mental health medications. Patient was recently discharged from a stay on a mental health unit due to suicidal ideation. Patient has a long history of struggling with suicidal ideation and attempts. Patient endorses active struggles with food restriction, as well as self-harm. Patient states that he greatly struggles with impulse control at this time, and concern of engaging more in self harm when not drinking. Patient denies any current suicidal or homicidal thoughts or plans. Patient is oriented x4.      Dimension #4 - Readiness for Change - Risk 0. Patient verbalizes that he feels that he is here on his own regard, despite being recommended by his psychiatrist. Patient does verbalize that he believes his use is problematic, and that he wants help to change at this time. Patient verbalizes willingness to follow all recommendations made for him while in treatment. Patient appears genuinely motivated for treatment and change at this time. Patient was calm, cooperative, and appropriately behaved throughout this appointment.     Dimension #5 - Relapse Potential - Risk 3. Patient reports regular use of alcohol up until 6/5/19, and so he therefore may currently lack the necessary coping skills to help him  prevent relapse at this time. He indicated that he struggles with impulse control which could indicate an increased risk of relapse at this time. Patient does report some past treatment experience and a history of periods of sustained sobriety. He therefore may have some knowledge of the skills needed to maintain sobriety, however it does not appear that he has been utilizing those skills in the past 6 months. Patient's lack of current skills, mental health, and impulse control issues appear to indicate an increased risk of relapse at this time.     Dimension #6 - Recovery Environment - Risk 2. Patient currently works full-time, and do he does appear to have some structure for his daily life. Patient struggled to identify sober hobbies, so he does appear to lack meaningful activity outside of work at this time. Patient states that he currently has a stable place to live, however it is not always the most supportive as he sometimes isolates. Patient indicated peer support for sobriety, and that he has been attending support groups, so he does have some support for his sobriety. He states that his family does not know about his use currently, and that they would not be supportive of him if they were made aware. Patient denies any current legal involvement.     T) Explained counselor & supervisor license number and contact info, Patient Bill of Rights, program rules/regulations, Program Abuse Prevention Plan, confidentiality & HIPPA policies, grievance procedure, presented ROIs, TB & HIV/AIDS policies & resources, and Vulnerable Adult policy. Patient expected to start group following the completion of his diagnostic assessment on 6/13/19.      VALENCIA Kumar  6/12/2019, 10:30 AM

## 2021-05-30 NOTE — PROGRESS NOTES
Mental Health Visit Note    7/26/2019    Start time: 802   Stop Time: 858   Session # 3    53+ session due to the extreme of patient's symptoms at this time.     Session Type: Patient is presenting for an Individual session.    Cong Tuttle is a 28 y.o. male is being seen today for    Chief Complaint   Patient presents with     MH Follow Up     Self-harm and earing struggles     Present For Session: Patient and therapist     New symptoms or complaints: None    Functional Impairment:   Personal: 3  Family: 3  Work: 2  Social:3    Clinical assessment of mental status: Mr. Tuttle presented on time for scheduled session today.  He was open and cooperative, and dressed appropriately for this session and weather. His memory was good for both short and long term.  His speech and language was soft and concise throughout the session.  Concentration and focus is within normal limits. Psychosis is not noted or reported. He reports his mood is sad.  Affect is congruent with speech.  Fund of knowledge is adequate. Insight is adequate for therapy. Reviewed and changes made as needed.     Suicidal/Homicidal Ideation present: None Reported This Session    Patient's impression of their current status: Patient reported feeling down. Patient indicated contacting his dad Friday to thank him and feeling hurt by how his dad responded. Patient reported feeling that he has disappointed his dad. Patient reported eventually this lead to self-harm. Patient indicated he has noticed his self-harm behavior related to feeling ugly. Patient reported feeling if he has scars people will see him as ugly as well. Patient indicated his eating has continued to stay the same. Patient reported he will not eat until late at night. Patient indicated he usually eats pizza and then will get up in the middle of the night and eat as well. Patient reported he has not been to a grocery store for 2 or 3 years and feels that eating is a chore for him.      Therapist impression of patients current state: Patient appears to have fair insight into his mental health. This therapist challenged patient on ways his self-esteem is related to his self-harm. This therapist processed with patient ways that he uses self-harm as a coping skills. This therapist processed with patient ways food has affected his life and not enjoying it. This therapist challenged patient on his avoidance of the grocery store.     Type of psychotherapeutic technique provided: Insight oriented, Client centered and CBT    Progress toward short term goals: Progress as expected with patient returning to scheduled session, using skills taught is session, attending group and maintaining sobriety.     Review of long term goals: Treatment Plan Updated on 07/26/2019    Diagnosis:   1. Major depressive disorder, recurrent episode, moderate (H)    2. JOSSIE (generalized anxiety disorder)    3. Moderate alcohol use disorder (H)      Plan and Follow up: Patient appears to have fair insight into his mental health. Patient would benefit from identifying what he wants in a friendship. Patient should work on using skills taught in session to work on reducing self-harm. Patient would benefit from keeping a food log and identifying emotions related to when he is eating. Patient should complete handouts given to him related to emotional regulation.     Discharge Criteria/Planning: Patient will continue with follow-up until therapy can be discontinued without return of signs and symptoms.    Maria Del Rosario Anand 7/26/2019

## 2021-05-30 NOTE — PROGRESS NOTES
Mental Health Visit Note    7/12/2019    Start time: 802   Stop Time: 858   Session # 2    53+ session due to continuing to gather information for DA.     Session Type: Patient is presenting for an Individual session.    Cong Tuttle is a 28 y.o. male is being seen today for    Chief Complaint   Patient presents with     MH Follow Up     Self-harm     Present For Session: Patient and therapist     New symptoms or complaints: Patient indicated struggling with self-harm.     Functional Impairment:   Personal: 3  Family: 3  Work: 2  Social:3    Clinical assessment of mental status: Mr. Tuttle presented on time for scheduled session today.  He was open and cooperative, and dressed appropriately for this session and weather. His memory was good for both short and long term.  His speech and language was soft and concise throughout the session.  Concentration and focus is within normal limits. Psychosis is not noted or reported. He reports his mood is depressed.  Affect is congruent with speech.  Fund of knowledge is adequate. Insight is adequate for therapy. Reviewed and changes made as needed.     Suicidal/Homicidal Ideation present: None Reported This Session    Patient's impression of their current status: Patient indicated feeling down. Patient reported hanging out with someone from group and now feeling used by her. Patient indicated a similar situation happened in the past. Patient reported being very upset that this happened to him again. Patient indicated he feels when he meets someone he needs to make friends with them quick but with his long term friends he cancels on them. Patient reported he also has been engaging in more self-harm. Patient indicated it becomes an obsession for him. Patient reported he has noticed a pattern that when he is sober he tends to self-harm more frequently.     Therapist impression of patients current state: Patient appears to have fair insight into his mental health. This  therapist processed with patient what he looks for in a friendship. This therapist challenged patient on the pressure he feels to make friends but not spending time with his old friends. This therapist processed with patient self-harm reeducation techniques.     Type of psychotherapeutic technique provided: Insight oriented, Client centered and CBT    Progress toward short term goals:Progress as expected, with patient coming to scheduled session.    Review of long term goals: Not done at today's visit due to still completing DA.     Diagnosis:   1. Major depressive disorder, recurrent episode, moderate (H)      Plan and Follow up: Patient appears to have fair insight into his mental health. Patient would benefit from identifying what he wants in a friendship. Patient should work on using skills taught in session to work on reducing self-harm.     Discharge Criteria/Planning: Patient will continue with follow-up until therapy can be discontinued without return of signs and symptoms.    Maria Del Rosario Anand 7/12/2019
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
## 2021-05-30 NOTE — PROGRESS NOTES
Cong Tuttle attended 3 hours of group today.     The group topic was Relapse Prevention, patient was responsive to topic.     Patients engagement in the group session: high     Total group size: 6      VALENCIA Blunt  7/17/2019, 1:36 PM

## 2021-05-30 NOTE — PROGRESS NOTES
Cong Tuttle attended 3 hours of group today. Patient reported multiple episode of self-harm over the weekend, no substance use, did not indicate SI.     Patients engagement in the group session: high     Total group size: 8      VALENCIA Blunt  7/8/2019, 3:06 PM

## 2021-05-30 NOTE — PROGRESS NOTES
Outpatient Mental Health Treatment Plan    Name:  Cong Tuttle  :  1990  MRN:  [id number]    Treatment Plan:  Initial Treatment Plan  Intake/initial treatment plan date:  2019  Benefit and risks and alternatives have been discussed: Yes  Is this treatment appropriate with minimal intrusion/restrictions: Yes  Estimated duration of treatment:  Ongoing therapy at this time  Anticipated frequency of services:  Weekly  Necessity for frequency: This frequency is needed to establish therapeutic goals and for continuity of care in order to monitor progress.  Necessity for treatment: To address cognitive, behavioral, and/or emotional barriers in order to work toward goals and to improve quality of life.    Session Type: Patient is presenting for an Individual session.    Plan:           ?   ? Anxiety    Goal:  Decrease average anxiety level from 4 to 1.   Strategies: ? [x]Learn and practice relaxation techniques and other coping        strategies (e.g., thought stopping, reframing, meditation)     ? [x] Increase involvement in meaningful activities     ? [x] Discuss sleep hygiene     ? [x] Explore thoughts and expectations about self and others     ? [x] Identify and monitor triggers for panic/anxiety symptoms     ? [x] Implement physical activity routine (with physician approval)     ? [] Consider introduction of bibliotherapy and/or videos     ? [x] Continue compliance with medical treatment plan (or explore          barriers)                                       []     ?Degree to which this is a problem: 4  Degree to which goal is met: 1    Date of next review: 10/26/2019       ? Depression    Goal:  Decrease average depression level from 4 to 1.   Strategies:    ?[x] Decrease social isolation     [x] Increase involvement in meaningful activities     ?[x] Discuss sleep hygiene     ?[x] Explore thoughts and expectations about self and others     ?[x] Process grief (loss of significant person,  independence, role,        etc.)     ?[x] Assess for suicide risk     ?[x] Implement physical activity routine (with physician approval)     [x] Consider introduction of bibliotherapy and/or videos     [x] Continue compliance with medical treatment plan (or explore         barriers)       ?  Degree to which this is a problem: 4  Degree to which goal is met: 1    Date of next review: 10/27/2019    Substance use  Goal:  Maintain sobriety .   Strategies: ? [] Consider referral for chemical dependency evaluation     ? [x] Discuss barriers to participating in AA or other peer-facilitated           groups         [x] Address environmental factors which may interfere with                                                    sobriety     ? [x] Explore short-term versus long-term consequences of use     ? [x] Continue compliance with medical treatment plan (or explore          barriers)     ?  Degree to which this is a problem: 4  Degree to which goal is met: 2    Date of next review: 10/27/2019       Functional Impairment:  1=Not at all/Rarely  2=Some days  3=Most Days  4=Every Day    Personal : 3  Family : 4  Social : 2   Work/school : 2    Diagnosis:  Major depressive disorder, recurrent, moderate; Generalized Anxiety disorder; Alcohol use disorder    WHODAS 2.0 12-item version: 38.58%      Scores presented in qualifiers to represent level of disability.  MODERATE Problem (medium, fair, ...) 25-49%    H1= 30  H2= 15  H3= 15        Clinical assessments and measures completed:. JOSSIE-7, PHQ-9 and CAGE-AID, CSSR     Strengths/personal resources:  (what does the patient do well, what is going well in life, positive personality characteristics):  Patient reported his strengths includes running, empathetic, compassionate and caring.     Weakness:  Patient indicated his weaknesses includes boundaries, self-worth, self-esteem and being assertive.     Cultural Considerations: Patient is a 28 year old single  male.     Persons  responsible for this plan: Patient, Provider and Other: VALENCIA Blunt, ATIF            Psychotherapist Signature           Patient Signature:              Guardian Signature             Provider: Performed and documented by SYLVIA Albarado   Date:  7/26/2019

## 2021-05-30 NOTE — PROGRESS NOTES
Cong Tuttle attended 3 hours of group today. Ct asked to submit UA this date.    Patients engagement in the group session: high  Topic:  Family Roles and Rules    Total group size: 4      VALENCIA Wood  7/5/2019, 12:12 PM

## 2021-05-30 NOTE — PROGRESS NOTES
Cong Tuttle attended 3 hours of group today.     The group topic was Addiction 101, patient was responsive to topic.     Patients engagement in the group session: high     Total group size: 8      VALENCIA Blunt  7/10/2019, 3:01 PM

## 2021-05-30 NOTE — PROGRESS NOTES
Cong Tuttle attended 3 hours of group today.     Patients engagement in the group session: medium     Total group size: 8      VALENCIA Blunt  7/3/2019, 2:44 PM

## 2021-05-30 NOTE — PROGRESS NOTES
Cong Tuttle attended 3 hours of group today.     The group topic was Grief and Loss, patient was responsive to topic.     Patients engagement in the group session: high     Total group size: 7      VALENCIA Blunt  7/24/2019, 3:03 PM

## 2021-05-30 NOTE — PROGRESS NOTES
Cong Tuttle attended 3 hours of group today. Patient discussed urges to self-harm, and taking steps towards action. Counselor encouraged patient to work on self-soothe skills and attempt to put up barriers to prevent SIB. Patient did not report SI.     Patients engagement in the group session: high     Total group size: 8      VALENCIA Blunt  6/28/2019, 1:54 PM

## 2021-05-30 NOTE — PROGRESS NOTES
Cong Tuttle attended 3 hours of group today.     The group topic was Strengths, patient was responsive to topic.     Patients engagement in the group session: high     Total group size: 8      VALENCIA Blunt  7/29/2019, 12:19 PM

## 2021-05-30 NOTE — PROGRESS NOTES
WHITNEY. Counselor met with patient for 70 minutes to discuss treatment progress. A. Counselor inquired about patient's maintaining boundaries with another group member, and discussed that allowing another patient to move in with this patient could potentially be grounds for both patient to be discharged. Counselor asked patient was delaying him from letting the other patient know that she can't move in with him, and discussed wording with patient and encouraged this patient to block the other patient. Counselor encouraged patient for taking step to create boundaries and reminded patient of the importance of maintaining boundary and not responding to the other patient as necessary. Counselor discussed patient's current recovery group involvement and sponsor, and encouraged patient to consider what his own needs are at this point. Counselor inquired as to how the patient will continue to maintain boundaries with his parents over the long weekend and encouraged patient to discuss with his mother that he is aware of where there is alcohol in the house. Counselor discussed patient's rigidity regarding exercise, encouraged patient to consider if he is applying rules to himself that he is not applying to other people and if this rigidity is costing him anything. R. Patient reported that the patient who previously asked him to pay for food has now asked if she can move in with him. Patient reported he initially said yes, but realizes that this would negatively impact his recovery as well as jeopardize his ability to stay in group. Patient reported willingness to text the other patient while meeting with counselor, requested help with wording, showed counselor the sent message and blocking on phone. Patient reported relief after doing this, and was receptive to counselor feedback about maintaining boundaries. Patient reported he is unsure if he want to continue with his current sponsor, but reported that it has been helpful to  have his sponsor for accountability in attending recovery groups. Patient discussed that it may be helpful to talk to his mother about moving where she keeps alcohol in the house, and that it is an eventual goal of staying in [place] over the weekend. Patient identiified that he is rigid with exercising, which at times prevents him from doing other things, and that he tends to have a rule that he has to exercise, but that he applies this only to himself. Patient reported this is area that he want to work on. T. Counselor to staff with other counselor regarding this situation, this patient is to inform counselors if he receives any other requests from the other patient, and primary counselor will follow up with patient. Patient to continue with phase I and meeting weekly with primary counselor.     Patient treatment was reviewed. Changes were not made. Patient was actively and directly involved in the treatment plan review and updates.     EVAN Blunt, [facility]  7/3/2019, 3:32 PM

## 2021-05-30 NOTE — PROGRESS NOTES
Cong Tuttle attended 3 hours of group today.     The group topic was Relapse Prevention, patient was responsive to topic.     Patients engagement in the group session: medium     Total group size: 8      VALENCIA Blunt  7/15/2019, 12:06 PM

## 2021-05-30 NOTE — PROGRESS NOTES
WHITNEY. Counselor met with patient for 75 minutes to discuss treatment progress. A. Counselor inquired about how patient's work and treatment balance is going, encouraged patient to work towards setting healthy boundaries related to time for work and in relationships. Counselor discussed potential family meeting with patient's parents, encouraged patient to consider what his goals are for this meeting. Counselor recommended patient have structured plans for the weekend if he decides to remain in [place], consider how to set boundaries when discussing his mental health. Counselor inquired about patient's recent self-harm, processed with patient the emotions this brought up, particularly where the patient noticed these emotions in his body and the accompanying thoughts. Counselor explained the difference between fused and defused thoughts, also presented some handouts related to emotional regulation. Counselor encouraged patient to attempt to use these skills before engaging in self-harm, also assessed for current SI. R. Patient discussed that frequently he stays later for work and has had difficulty seting boundaries with time. Patient reported that he discussed with his mother about doing a family conference, agreed to consider what his goals would be for this potential appointment. Patient discussed an interaction with his father, and that the patient's SIB is frustrating to his father, reported plan to remain in city to not engage with his father. Patient reported recent self-harm brought up painful memory, processed some of these emotions with counselor. Patient reported urges to self-harm, but was willing to attempt self-soothing before engaging in self-harm. Patient reported no current SI, willing to utilize safety plan. T. Patient to continue with phase 1, meeting individually with counselor weekly.     Patient treatment was reviewed. Changes were made. Patient was actively and directly involved in the treatment  plan review and updates.     EVAN Blunt, [facility]  7/18/2019, 4:21 PM

## 2021-05-30 NOTE — PROGRESS NOTES
Cong Tuttle attended 3 hours of group today.     The group topic was Grief and Loss, patient was responsive to topic.     Patients engagement in the group session: high     Total group size: 8      VALENCIA Blunt  7/26/2019, 2:12 PM

## 2021-05-30 NOTE — PROGRESS NOTES
Individual Treatment Plan    Patient  Name: Cong Tuttle  MRN: [id number]   : 1990  Admit Date: 19  Date of Initial Service Plan: 19  Tentative Discharge Date: 19    Diagnosis: Alcohol Use Disorder, Moderate (F10.20)    Counselor: Elinor Pandya, EVAN, VALENCIA      Dimension 1: Acute Intoxication/Withdrawal Potential, Risk level: 0    Problem Statement from Comprehensive Assessment:  Patient reports weekly alcohol use, with his last use being on 19. Patient did recently get discharged from a mental health unit, and so was monitored for potential withdrawal while there. Patient denies any current withdrawal symptoms or concerns. Patient shows no physical signs or symptoms of intoxication or withdrawal. Patient may be at some risk of withdrawal if he discontinues his anti-anxiety medication. Patient does not appear to be at risk of severe withdrawal at this time. Patient is oriented x4.     Problem: Patient's last use of alcohol on 19.  Goal: Patient to maintain abstinence throughout outpatient treatment.   Must be reached to complete treatment? Yes  Methods/Strategies (must include amount and frequency):   1. Patient to report any substance/alcohol use to counselor to determine if any changes need to be made to address withdrawal symptoms.   2. Patient to complete any requested UAs.   Target Date: 19  Completion Date:        Dimension 2: Biomedical Conditions/Complications, Risk level: 0    Problem Statement from Comprehensive Assessment:  Patient denies any current health issues or concerns. He does not currently have a PCP, but does have insurance and so appears able to get his medical needs met and addressed as necessary. Patient denies any immediate medical needs or concerns. He appears to be in adequate physical health and functioning at this time.     Problem: Patient reports good physical health.   Goal: Patient to maintain stable health throughout outpatient treatment.    Must be reached to complete treatment? No  Methods/Strategies (must include amount and frequency):   1. Patient to report any changes to physical health to counselor.   2. Patient to attend all scheduled doctor s appointments.   3. Patient to take medications as prescribed.   Target Date: 9/20/19  Completion Date:     Problem: Patient reports current tobacco use.   Goal: Patient to receive information about smoking cessation.   Must be reached to complete treatment? No  Methods/Strategies (must include amount and frequency):   1. Staff to provide patient with nicotine cessation information and help on how to quit use.   2. Patient to report any progress on stopping nicotine use to staff.   Target Date: 9/20/19  Completion Date:       Dimension 3: Emotional/Behavioral/Cognitive, Risk level: 2    Problem Statement from Comprehensive Assessment:  Patient reports mental health diagnoses of major depressive disorder, generalized anxiety disorder, and borderline personality disorder. Patient reports that he does currently have psychiatry services that he regularly attends (weekly currently). Patient denies any other current mental health services, but does verbalize a desire to obtain therapy services. Patient endorses current mental health medications. Patient was recently discharged from a stay on a mental health unit due to suicidal ideation. Patient has a long history of struggling with suicidal ideation and attempts. Patient endorses active struggles with food restriction, as well as self-harm. Patient states that he greatly struggles with impulse control at this time, and concern of engaging more in self harm when not drinking. Patient denies any current suicidal or homicidal thoughts or plans. Patient is oriented x4.     Problem: Patient reports mental health diagnoses of depression, anxiety and borderline personality disorder.   Goal: Patient to manage mental health.   Must be reached to complete treatment?  No  Methods/Strategies (must include amount and frequency):   1. Patient to meet with counselor or other  staff to complete a diagnostic assessment for mental health. Patient to discuss with primary counselor potential referrals.  Target Date: 9/20/19  Completion Date: 6/13/19  2. Patient to begin using coping skills learned in therapeutic group (such as grounding, breathing, thought challenging, mindfulness, etc), and share in daily check-in any benefits or challenges that you experience using these skills.  3. Patient to attend all psychiatry appointments.   4. Patient to take all medications as prescribed.   5. Patient to attend all therapy appointments, contact mental health clinic if unable to attend ([phone])   Target Date: 9/20/19  Completion Date:     Problem: Patient struggles with suicidal ideation and self harm.   Goal: Patient to manage urges to self harm.   Must be reached to complete treatment? No  Methods/Strategies (must include amount and frequency):   1. Patient to develop a safety plan if necessary with primary counselor, and report to counselor immediately of any self harm or suicidal thoughts.   2. Patient to work with counselor on urges to restrict food, patient to identify small goals to increase daily calorie consumption, share benefits and challenges you experience.   3. Patient to attempt to utilize coping skills before engaging in self-harm, and report all self-harm to counselor.   3. Patient to seek medical attention if necessary.   Target Date: 9/20/19  Completion Date:       Dimension 4: Readiness to Change, Risk level 0    Problem Statement from Comprehensive Assessment:  Patient verbalizes that he feels that he is here on his own regard, despite being recommended by his psychiatrist. Patient does verbalize that he believes his use is problematic, and that he wants help to change at this time. Patient verbalizes willingness to follow all recommendations made for him while in  treatment. Patient appears genuinely motivated for treatment and change at this time. Patient was calm, cooperative, and appropriately behaved throughout this appointment.     Problem: Patient reports motivation for treatment and sobriety.   Goal: Patient to increase motivation towards recovery by participating in outpatient programming.   Must be reached to complete treatment? Yes  Methods/Strategies (must include amount and frequency):   1. Patient to attend 3, 3-hour groups per week and all scheduled 1:1s.  2. Patient will contact staff if unable to attend (Joelle [phone], Elinor [phone]).  3. Patient will identify weekly and daily goals to help increase motivation and engagement in recovery.  Target Date: 9/20/19  Completion Date:       Dimension 5: Relapse/Continued Use/Continued Problem Potential, Risk level: 3    Problem Statement from Comprehensive Assessment:  Patient reports regular use of alcohol up until 6/5/19, and so he therefore may currently lack the necessary coping skills to help him prevent relapse at this time. He indicated that he struggles with impulse control which could indicate an increased risk of relapse at this time. Patient does report some past treatment experience and a history of periods of sustained sobriety. He therefore may have some knowledge of the skills needed to maintain sobriety, however it does not appear that he has been utilizing those skills in the past 6 months. Patient's lack of current skills, mental health, and impulse control issues appear to indicate an increased risk of relapse at this time.       Problem: Patient lacks coping skills to prevent relapse.   Goal: Patient to gain skills to prevent relapse.   Must be reached to complete treatment? Yes  Methods/Strategies (must include amount and frequency):   1. Patient to share in daily check-in any urges and addictive thinking to better understand his pattern of use and to prevent relapse in the future.  2.  Patient to identify at least 5 personal triggers and high-risk situations for relapse.  3. Patient to learn and implement at least 5 personal coping strategies to manage urges to use.  4. Patient to identify 5 beliefs and/or behaviors that have blocked him from staying sober in the past. Identify at least 5 alternative thoughts and behaviors more conducive to recovery  Target Date: 9/20/19  Completion Date:       Dimension 6: Recovery Environment, Risk level: 2    Problem Statement from Comprehensive Assessment:  Patient currently works full-time, and do he does appear to have some structure for his daily life. Patient struggled to identify sober hobbies, so he does appear to lack meaningful activity outside of work at this time. Patient states that he currently has a stable place to live, however it is not always the most supportive as he sometimes isolates. Patient indicated peer support for sobriety, and that he has been attending support groups, so he does have some support for his sobriety. He states that his family does not know about his use currently, and that they would not be supportive of him if they were made aware. Patient denies any current legal involvement.     Problem: Lack of sober support   Goal: Patient to expand sober support.  Must be reached to complete treatment? yes  Methods/Strategies (must include amount and frequency):   1. Explore and identify at least 2 sober support meetings to regularly attend.  2. Patient to identify at least 1 person from a meeting that he would like to get to know better. Patient to discuss with counselor ways that he can work to build friendship with this person.  Target Date: 9/20/19  Completion Date:     Problem: Patient lacks sober activity.  Goal: Patient to develop sober hobbies.   Must be reached to complete treatment? Yes  Methods/Strategies (must include amount and frequency):   1. Identify activities that peak interest. Maintain consistent attendance.  2.  Patient to identify weekly and daily activities he does to maintain his recovery. Share with counselor.   3. Patient to identify new places in the [facility] that he has interest in visiting. Patient to share with counselor/group any challenges or benefits you experience.    Target Date: 9/20/19  Completion Date:       Resources  Resources to which the patient is being referred for problems when problems are to be addressed concurrently by another provider: Psychiatry, patient has been referred to [facility] Albarado for individual therapy.          By signing this document, I am acknowledging that I was actively and directly involved in the development of my treatment plan.         Patient Signature:_____________________________            Date:_____________     Staff Signature:   EVAN Blunt, [facility]                   Date: 7/18/2019, 4:44 PM

## 2021-05-30 NOTE — PROGRESS NOTES
"D. Counselor met with patient for 100 minutes to discuss treatment progress. A. Counselor inquired about patient's conversations with his mother and brother and discussed potential family conference. Counselor encouraged patient to have realistic expectations of the family conference, and that the patient has to decided what role he wants his father to play into his recovery. Counselor inquired about patient's fears related to discontinuing self-harm, and noted that the patient's pattern of self-harm appears to reinforce his self-hate. Counselor discussed how patient's story of rejection at an early age has become the lens that he sees the world through. Counselor discussed with patient that changing his behavior may be necessary to challenge this belief. R. Patient reported that it has been helpful to talk to his mother and brother and that he believes that his father will participate in a family conference. Patient reported concern that his father may be defensive about the family meeting, and reported he would be disappointed about this. Patient discussed having a family trip coming up and he is concerned about how others in his family will view his self-harm scars. Patient reported continued ambivalence about self-harm and that it is difficult for him to see it as negative, and that it helps him to \"match his inside,\" reinforcing his self-hatred. Patient discussed past rejection in relationships, appeared somewhat receptive to feedback that relationships end for a variety of reasons. Patient appeared somewhat receptive to feedback about practicing love for himself to shift mindset. T. Patient to continue with phase I, expected to move to phase II around 7/8/19, continue to meet weekly with primary counselor.     Patient treatment was reviewed. Changes were not made. Patient was actively and directly involved in the treatment plan review and updates.     EVAN Blunt, [facility]  7/25/2019, 3:41 PM  "

## 2021-05-30 NOTE — PROGRESS NOTES
Cong Tuttle attended 3 hours of group today.     The group topic was Relapse Prevention, patient was responsive to topic.     Patients engagement in the group session: high     Total group size: 7      VALENCIA Blunt  7/19/2019, 2:11 PM

## 2021-05-30 NOTE — PROGRESS NOTES
Cong Tuttle attended 2 hours of group today. Counselor met with patient during 1 hour of group.     Patients engagement in the group session: medium     Total group size: 7      VALENCIA Blunt  7/1/2019, 1:37 PM

## 2021-05-30 NOTE — PROGRESS NOTES
Initial Psychotherapy Diagnostic Assessment     [x] Standard  [] Updated    Date(s): 2019  Start Time: 805  Stop Time: 855    Patient Name:  Cong Tuttle  Age: 28 y.o.   :  1990  MRN:  [id number]    Session Type: Patient is presenting for an Individual session.       Reason for Referral:  Mr. Tuttle is a 28 y.o. year-old male who was referred on 2019 by Elinor Pandya LPC, [facility] for an evaluation of cognitive, behavioral, and emotional functioning.  The patient was made aware of the role of psychology service in the patient's care, risks and benefits, and the limits of confidentiality.  The patient agreed to proceed.         Persons Present: Patient and therapist    Presenting Problem/History:    Patient reported being referred to therapy due to different struggles with his mental health and addiction. Patient reported he has been struggling with self-harm since the age of 17. Patient indicated the self-harm seems to get worse when he gets sober. Patient reported cutting and burning are the ways that he self-harms to relieve stress and never to try and hurt himself. Patient indicated he is struggling with where he is at in life and not accomplishing goals that he set for himself. Patient reported this results in a lot of low self-esteem. Patient indicated he also has struggles with eating. Patient reported he typically will not eat until the end of the day. Patient indicated he does not feel that he deserves to eat until he has worked out. Patient reported seeing this as a struggle but not being able to change this patter. Patient indicated he had struggles with alcoholism and first sought treatment in 2016. Patient reported staying sober until 2018 and now in outpatient treatment to help maintain sobriety.     Patient s expectation for treatment (patient stated initial goal; i.e.: I want to let go of my worries , Medication treatment if indicated):  Patient indicated  wanting to address and feel better about the different struggles he has identified.          Functional Impairments: (subjective- 0 is no issues and 4 is extreme issues)  Personal: 4  Family: 4  Work: 4  Social:4     How does the presenting problem affect patients daily functioning:     Patient reported his mental health will make him cancel plans, isolate and turn to self-harm or alcohol as a solution.     Issues/Stressors:   Patient talked about his stressors being his drinking, self-harm, eating, work and his family.     Physical Problems: Constipation and Weight loss    Social Problems: Job problems, Communications problems, Distrust of others, Unstable relationships, Problems with father, Decreased social activity and Loss of interest in activities      Behavioral Problems: Obsessive/compulsive, suicidal attempt, excessive shopping, Binge eating, Excessive work, Reckless, Restricted eating, Self-injurious behavior and Subtance abuse      Cognitive Problems: Distractability, Poor attention, Indecisiveness, Poor memory, Forgetful, Disordered thinking, Racing thoughts, Bothersome thoughts, Procrastination, Recurrent bad memories and Worries      Emotional Problems: Anxious, Nervous, Apathetic, Irritable, Emptiness, Boredom, Excessive fears, Depressed mood, Elevated mood, Mood swings, Feelings of shame, Feelings of guilt, Lack of self confidence and Inferiority feelings     Onset/Frequency/Duration presenting problem symptoms:    Patient reported the symptoms have been present for a long time. Patient unable to identify a specific timeframe but indicated years.     How does the patient perceive his problem in relation to how others see his/her problem?    Patient indicated he only talks with a few people about his problems otherwise keeps them to himself.     Family/Social History:     Marriages/Significant other:     Patient denied being . Patient indicated being in significant significant relationships  that did not work out.     Children:  (sex and ages, any significant issues):  Patient does not have any children.     Parents:  (ages, living or , how many years ):  Patient reported his parents are  and have been together over 30 years.     Siblings:  (birth order, ages, significant issues):  Patient indicated he has two younger brother's. Patient indicated having a good/close relationship with his youngest brother. Patient reported not being very close with his middle brother.     Education:   Patient reported receiving a B.S. Degree.     Developmental factors:  (developmental milestones, head injuries, CVA s, etc. that may have impeded milestones):  Patient indicated meeting all developmental milestones.     Significant personal relationships including patient s evaluation of the relationship quality:  (Co-worker s, neighbor s, AA groups, Evangelical peers, etc.):   Patient reported his significant personal relationships includes his mom, brother, friends and family.     Sexual/physical/emotional/financial abuse/traumatic event:  (any child protection involvement; who reported, Impact on patient/family/other):   Patient did not identify any trauma.      Contextual Non-personal factors contributing to the patients concerns:  (divorce in family, nation/natural disasters):  Patient did not identify any contextual non-personal factors contributing to his presenting concerns.     Strengths/personal resources:  (what does the patient do well, what is going well in life, positive personality characteristics):  Patient reported his strengths includes running, empathetic, compassionate and caring.    Weakness:  Patient indicated his weaknesses includes boundaries, self-worth, self-esteem and being assertive.       Support network(s)/Resources:  (including strength and quality of social networks, who does the client consider supportive, other agencies or services patient uses):   Patient reported his  support network includes his sponsor and outpatient [facility]D group.     Belief system:    Patient did not identify a belief system.     Cultural influences and impact on patient:  (ask about all aspects of culture and ask which are relevant to the patient. Go beyond nationality and ethnicity. Consider biases, life style, community style, i.e.: urban, poverty, abuse, etc). see page 5 Diagnostic Assessment, Clinical Training for descriptors):  Patient is a 28 year old single  male. Patient reported growing up his father was authoritative and his mom was loving. Patient indicated his father was the bread winner and made many of the decision in the house. Patient reported when it came to graduating his father pushed that he needed to attend college in a stable career. Patient indicated not being supported in his plans and his father insisting that he go for engineering. Patient reported since gradating trying to do what his father wants but not feeling that it is enough. Patient indicated he has a lot of resentment towards his dad.     Cultural impact on health and health care:  (how does patient s culture influence how the patient receives health care):   Patient indicated he uses Western medicine by going to the doctor and taking medication as prescribed.     Current living situation:  (Household members, housing status, stability, multiple moves, potential eviction):  Patient reported he lives in an apartment that he rents where he feels safe.     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     Financial Concerns:  (basic status, housing, food, clothing are they on any assistance including SSI/SSDI):   Patient reported financial concerns due to compulsive spending and problems managing/prioritizing money.     Legal Problems:  (DUI S, divorce, law suits, etc.):    Patient indicated legal problems including a DUI.     Patient Medical  History  Mr. Ventura reported no significant medical concerns or history.     Current Medications:  Current Outpatient Medications   Medication Sig Note     b complex vitamins tablet Take 1 tablet by mouth daily.      cholecalciferol, vitamin D3, (VITAMIN D3 ORAL) Take by mouth daily.      clonazePAM (KLONOPIN) 1 MG tablet Take 2 mg by mouth 2 (two) times a day.      diphenhydrAMINE (BENADRYL) 25 mg tablet Take 25 mg by mouth at bedtime as needed for sleep.      disulfiram (ANTABUSE) 250 mg tablet Take 250 mg by mouth daily.      docosahexanoic acid/epa (FISH OIL ORAL) Take by mouth daily.      multivitamin therapeutic tablet Take 1 tablet by mouth daily.      naltrexone (DEPADE) 50 mg tablet Take 50 mg by mouth daily. 4/22/2019: Reports sometimes taking 2 tablets to help avoid alcohol cravings     venlafaxine (EFFEXOR-XR) 150 MG 24 hr capsule Take 150 mg by mouth daily.      zolpidem (AMBIEN) 10 mg tablet Take 10 mg by mouth at bedtime.        Past Mental Health History:    Previous mental health diagnosis:  Patient reported being diagnosed with major depressive disorder and generalized anxiety disorder in 2008, substance abuse disorder in 2012 and borderline personality disorder in 2016.    Hx of Mental Health Treatment or Services:  Patient indicated seeing a therapist in [place].     Hx of MH Tx/Hospitalizations:  (When/Where: must include a review of patient s record.  If not available, why, what if anything are you doing to obtain a record?):   Patient indicated being inpatient psych in 2012, 2016 and in 2019 at [facility].     Hx of Psychiatric Medications:    Current Outpatient Medications:      b complex vitamins tablet, Take 1 tablet by mouth daily., Disp: , Rfl:      cholecalciferol, vitamin D3, (VITAMIN D3 ORAL), Take by mouth daily., Disp: , Rfl:      clonazePAM (KLONOPIN) 1 MG tablet, Take 2 mg by mouth 2 (two) times a day., Disp: , Rfl:      diphenhydrAMINE (BENADRYL) 25 mg tablet, Take 25 mg by  mouth at bedtime as needed for sleep., Disp: , Rfl:      disulfiram (ANTABUSE) 250 mg tablet, Take 250 mg by mouth daily., Disp: , Rfl:      docosahexanoic acid/epa (FISH OIL ORAL), Take by mouth daily., Disp: , Rfl:      multivitamin therapeutic tablet, Take 1 tablet by mouth daily., Disp: , Rfl:      naltrexone (DEPADE) 50 mg tablet, Take 50 mg by mouth daily., Disp: , Rfl:      venlafaxine (EFFEXOR-XR) 150 MG 24 hr capsule, Take 150 mg by mouth daily., Disp: , Rfl:      zolpidem (AMBIEN) 10 mg tablet, Take 10 mg by mouth at bedtime., Disp: , Rfl:     Self Report Measures:    On the Patient Health Questionnaire-9, a self report measure of depressive symptomatology, he obtained a score of 16, placing him in the range of moderate depression.      On the Generalized Anxiety Disorder-7, a self-report measure of anxiety, he obtained a score of 14,  placing him in the range of moderate anxiety.      Suicidal/Homicidal Risk Assessment:    Suicidal: None reported  Ideation:None reported  History of Past Attempt(s): description: Yes, patient indicated due to self-harm being hospitalized for 5 days for mental health.   Crisis Plan: Patient agreed to call 911 and/or report to ER id suicidal ideations were to occur. Patient denied current, suicidal ideations, plans and/or means.     Homicidal: None reported   Ideation:None reported  History of Aggression towards others: None reported  Crisis Plan: None reported    [place] Suicide Severity Risk Screen:  Positive    History of destruction to property:  Description: None reported  Crisis Plan: None reported      Family Mental Health/Medical History    Family Mental Health:    Patient indicated his mother and maternal grandmother and youngest brother take medication for mental health. Patient reported his middle brother having struggles with mental health in the past.     Family history of Suicide:  Patient denied any known suicides in his family.     Family history Chemical  Dependency:    Patient indicated both his parents are heavy drinkers. Patient reported his dad's side of the family also being heavy drinkers.    Chemical Use/Abuse History    CAGE-AID (screening to determine a patients use/abuse/dependency):      4/4    1.  Have you ever felt you ought to cut down on your drinking or drug use?  2.  Have people annoyed you by criticizing your drinking or drug use?  3.  Have you felt bad or guilty about your drinking or drug use?  4.  Have you ever had a drink or use drugs first thing in the morning to steady her nerves are to get rid of a hangover (eye-opener)?    Alcohol:   [] None Reported    [x] Yes   [] No  Type: Beer, wine, hard liquor  Frequency: Daily  Age of first use: 16    Date of last use: 07/08/2019          Street Drugs:   [x] None Reported    [] Yes   [] No    Prescription Drugs:   [x] None Reported    [] Yes   [] No   Tobacco:   [] None Reported    [x] Yes   [] No  Caffeine:   [] None Reported    [x] Yes   [] No  Currently in a treatment program:   [x] Yes   [] No    [facility]    History of CD Treatment:      [] None Reported               Description: Patient reported attending IOP 4 times in his life.     MALENA Received:    [x] Yes   [] No       Collaborative info requested/received:   [x] Yes   [] No    MENTAL STATUS EVALUATION  Grooming: Well groomed  Attire: Appropriate  Age: Appears Stated  Behavior Towards Examiner: Cooperative  Motor Activity: Within normal   Eye Contact: Appropriate  Mood: Anxious  Affect: Anxious  Speech/Language: Soft  Attention: Within normal  Concentration: Within normal  Thought Process: Within normal  Thought Content: Hallucinations: None reported  Delusions: None reported  Orientation: X 3  Memory: No Evidence of Impairment  Judgement: No Evidence of Impairment  Estimated Intelligence: Average  Demonstrated Insight: Adequate  Fund of Knowledge: adequate    Clinical Impressions/Assessment/Recommendations: (Stands alone; is  a synopsis of patients story, any impacting family or cultural issue on diagnosis and how patient meets criteria for diagnosis). Can state does not meet full criteria and therefore a provisional diagnosis is given.    The patient is a 28 y.o. year-old, single male.  Patient reported being referred to therapy due to different struggles with his mental health and addiction. Patient reported he has been struggling with self-harm since the age of 17. Patient indicated the self-harm seems to get worse when he gets sober. Patient reported cutting and burning are the ways that he self-harms to relieve stress and never to try and hurt himself. Patient indicated he is struggling with where he is at in life and not accomplishing goals that he set for himself. Patient reported this results in a lot of low self-esteem. Patient indicated he also has struggles with eating. Patient reported he typically will not eat until the end of the day. Patient indicated he does not feel that he deserves to eat until he has worked out. Patient reported seeing this as a struggle but not being able to change this patter. Patient indicated he had struggles with alcoholism and first sought treatment in November 2016. Patient reported staying sober until December 2018 and now in outpatient treatment to help maintain sobriety. Patient indicated wanting to address and feel better about the different struggles he has identified.  Patient reported his mental health will make him cancel plans, isolate and turn to self-harm or alcohol as a solution. Patient talked about his stressors being his drinking, self-harm, eating, work and his family.     Patient reported growing up his father was authoritative and his mom was loving. Patient indicated his father was the bread winner and made many of the decision in the house. Patient reported when it came to graduating his father pushed that he needed to attend college in a stable career. Patient indicated not  being supported in his plans and his father insisting that he go for engineering. Patient reported since gradating trying to do what his father wants but not feeling that it is enough. Patient indicated he has a lot of resentment towards his dad. Patient reported his strengths includes running, empathetic, compassionate and caring. Patient indicated his weaknesses includes boundaries, self-worth, self-esteem and being assertive. Patient reported his support network includes his sponsor and outpatient MICD group.     Prioritization of needed mental Health ancillary or other services.   One on one weekly psychotherapy to work on decreasing patient symptoms with a CBT and MI approach.   Continue with recommendation from psychiatrist  Continue with recommendations and attending MICD    How Diagnostic criteria is met: (Include symptoms, frequency, duration, functional impairments).  It appears patient meets DSM-5 criteria for major depressive disorder, recurrent, moderate as evidenced by depressed mood, decreased interests in pleasurable activities, isolation, feelings of hopelessness,  social activities, feelings of worthlessness, irritable, crying/feeling overly emotional, feeling of worthlessness, excessive shame and guilt, poor attention, indecisiveness and feelings of hopelessness and helplessness.  It appears patient meets DSM-5 criteria for generalized anxiety disorder as evidenced by excessive anxiety and worry, difficulty controlling the worry, feeling keyed up or on edge, difficulty sitting still, racing thoughts, obsessive and compulsive behaviors.   It appears patient meets DSM-5 criteria for alcohol use disorder as evidenced by drinking alcohol daily in the amount of one pint and attending treatment to help stop.     Explanation for any provisional diagnosis. Hypothesis why alternative diagnosis was considered and ruled out.  Rule out of borderline personality disorder due to patient indicating being  diagnosed with this in the past but therapist needing more time with patient to confirm diagnosis.     Recommendations (treatment, referrals, services needed).  One on one psychotherapy  Continue with psychiatrist  Continue MICD group      Diagnosis (non-Axial as defined in DSM-5)  Major depressive disorder, recurrent, moderate  Generalized anxiety disorder  Alcohol use disorder    Provisional Diagnosis (list only- no explanation needed)  Borderline personality disorder      WHODAS 2.0 12-item version: 38.58%   H1= 30  H2= 15  H3= 15    Scores presented in qualifiers to represent level of disability.    NO problem - (none, absent, negligible,  ) - 0-4 %   MILD problem - (slight, low, ) - 5-24 %   MODERATE problem - (medium, fair,...) - 25-49 %   SEVERE problem - (high, extreme,  ) - 50-95 %   COMPLETE problem - (total, ) -  %    Assessment of client resolving presenting mental health concerns:  Ability  [] low     [x] average     [] high  Motivation [] low     [x] average     [] high  Willingness [] low     [x] average     [] high    Sources/references used in completing this assessment:  -Face to face interview  -Patient Chart  -Adult Intake Questionnaire  -Measures completed: WHODAS, C-SSRS, CAGE, PHQ-9, JOSSIE-7,       Initial Therapy Plan (ex: develop therapeutic relationship with therapist, Refer to psychiatry/psych testing, etc.):    1. Patient and therapist will develop therapeutic relationship.  2. Patient to present for follow up appointment to initiate psychotherapy services.  3. Patient to continue to follow up with primary care physician as needed and take medications as prescribed.  4. Develop comprehensive treatment plan.         Is patient's family involved in the treatment?  [x] No     [] Yes      If no, Why?  Patient did not indicate wanting family part of therapy at this time.         Therapist s Signature/Supervision/co-signature statement:   Maria Del Rosario Anand [facility]

## 2021-05-30 NOTE — PROGRESS NOTES
WHITNEY. Counselor met with patient for 115 minutes to discuss treatment progress. A. Counselor inquired into patient's alcohol use on 7/8/19, discussed putting up barriers to make it harder use. Counselor encouraged patient to be aware of what he has control over and not taking responsibility for other people's choices. Counselor also discussed being aware of boundaries and how intimate relationship can take the focus off of recovery. Counselor inquired about the patient's plans for the weekend, and suggested having a back up plan if things with the patient's father don't go well. Counselor encouraged patient to discuss his feelings related to his father with his therapist. Counselor discussed that treatment can be extended for clinical reasons, but also being aware if the patient is getting too comfortable being treatment. Counselor discussed that continued use can result in a referral to a higher level of care and also being aware that significant increases in mental health symptoms may need a referral to a mental health program. Counselor assessed for SI, discussed concerns related to SIB. R. Patient reported that he is feeling better emotionally since he has been honest about the alcohol use on 7/8/19, reported that he didn't realize alcohol was available at gas stations, discussed potentially not carrying cash or not going into gas stations for awhile. Patient discussed feeling responsible for the situation of another patient requesting to live with him, or that it was his fault that these things have happen, was somewhat receptive to feedback that he is not responsible for other people's choices. Patient identified that he has a harder time setting boundaries with women, and asked if he should not be a relationship right now. Patient reported plan to go to [place] for the weekend, indicated that it will just be him and his dad. Patient reported feeling that a lot his feelings and thoughts often relate back to his  relationship with his father. Patient reported that he feels very comfortable in treatment and has some concerns about when treatment ends, reported understanding of plan to continue to discuss this with counselor, also reported understanding reasons for other referrals if necessary. Patient reported very little passive SI recently, and no SIB since 7/7/19, discussed his ambivalence related to SIB. T. Patient to continue with phase I and meeting individually with counselor weekly.     Patient treatment was reviewed. Changes were not made. Patient was actively and directly involved in the treatment plan review and updates.     EVAN Blunt, [facility]  7/10/2019, 4:07 PM

## 2021-05-30 NOTE — PROGRESS NOTES
Cong Tuttle attended 3 hours of group today.     Patients engagement in the group session: high     Total group size: 8      VALENCIA Blunt  6/26/2019, 12:10 PM

## 2021-05-30 NOTE — PROGRESS NOTES
Cong Tuttle attended 3 hours of group today.     The group topic was Grief and Loss, patient was responsive to topic.     Patients engagement in the group session: medium     Total group size: 8      VALENCIA Blunt  7/22/2019, 12:09 PM

## 2021-05-30 NOTE — PROGRESS NOTES
"D. Counselor met with patient for 75 minutes to discuss treatment progress. A. Counselor inquired about how patient's therapy session went. Counselor inquired about patient's plan for the weekend, and encouraged patient to consider warning signs that it would be safer for him to leave and what his plan would be if he ended up leaving. Counselor inquired about how the patient's father's feedback impacts him and suggest setting a boundary regarding this feekback. Counselor inquired about patient's goal of increasing eating and suggested adding a small amount of food to the banana he is currently eating. Counselor inquired about self-harm and SI and patient's willingness to follow safety plan, discussed with patient finding a way to \"reward\" himself other than self-harm. R. Patient reported plan to continue with therapist. Patient reported that he is planning on going home this weekend, but reported that if his parent drink in front of him or if their behavior is negatively impacting he planned to go to a friend's house. Patient agreed with talking to his father about his feedback and using assertive communication. Patient reported he has been eating a banana in the morning and discussed that weighing himself may not be helpful. Patient reported that he noticed that he has more urges to self harm when scars start to heal and agreed that this has been a way to reward himself or reinforced his belief that he is ugly. Patient discussed shoes have been a way to like his appearance, but that he has also thought about tattoos. Patient reported no current SI and no SIB since 6/18/19. T. Patient to continue with phase I, meeting weekly with counselor, counselor to continue to assess for SIB and SI.     Patient treatment was reviewed. Changes were not made, counselor to update plan to reflect specificity.  Patient was actively and directly involved in reviewing treatment plan goals.     EVAN Blunt, [facility] [place]  6/27/2019, " 3:19 PM

## 2021-05-30 NOTE — PROGRESS NOTES
Cong Tuttle attended 3 hours of group today.     The group topic was Addiction 101, patient was responsive to topic.     Patients engagement in the group session: high     Total group size: 5      VALENCIA Blunt  7/12/2019, 12:06 PM

## 2021-05-31 NOTE — PROGRESS NOTES
WHITNEY. Counselor met with patient for 100 minutes to discuss treatment progress. A. Counselor assessed for SI, discussed continuing to follow safety plan and considering other tactics to help keep the patient safety. Counselor discussed with patient setting boundaries with another patient in group. Counselor validated patient's questions about meaning and purpose, but also encouraged patient limit the amount of thinking towards euthanasia. Counselor encouraged patient to disclose to his friend that he is having thoughts about suicide, and asked patient if he is willing to commit to safety, calling for emergency services as necessary. R. Patient reported that he is experiencing less SI, has intentionally not gone out to smoke near the enter ance near the lightrail. Patient discussed that he has been spending time with someone from group, but he is unsure if he would like to remain friends. Patient reported plans to set boundaries with this individual. Patient reported that he has been thinking about the concept of euthanasia, and that he dislikes that the government regulates people's choices, but articulated that his beliefs about this are also separate from his recent suicidal thoughts. Patient was receptive to counselor's feedback that thinking about this as a philosophical issue may be reinforcing his SI, and that it is not probably helpful at the moment. Patient identified that he has trouble finding meaning and purpose and feel that his existence is random. Patient re-affirmed willingness to follow his safety plan, and that he planned to tell a close friend how he is feeling tonight. Patient reported willing to reach out to this friend in crisis or call 911 if necessary. T. Patient to attend group on 8/30/19, counselor to re-assess for safety at that time.     EVAN Blunt, [facility] [place]  8/29/2019, 4:24 PM

## 2021-05-31 NOTE — PROGRESS NOTES
Cong Tuttle attended 3 hours of group today.     The group topic was Strengths, patient was responsive to topic.     Patients engagement in the group session: high     Total group size: 8      VALECNIA Blunt  7/31/2019, 12:09 PM

## 2021-05-31 NOTE — PROGRESS NOTES
Cong Tuttle attended 3 hours of group today. Patient disclosed to group that he was having SI, reports particularly have urges when seeing the lightrail past by his work, was encouraged by staff to use a different entrance when smoking. Patient reported meeting all goals set set yesterday, reported that he removed knives and belts from his apartment. Patient reported continued willingness to follow safety plan with goal for the day to remain safe. Patient reported SI, but reported no intent. Patient in agreement to check in with counselor in the morning and meet at 2pm on 8/29/19.     The group topic was Dual Diagnosis I, patient was responsive to topic.     Patients engagement in the group session: medium     Total group size: 7      VALENCIA Blunt  8/28/2019, 1:43 PM

## 2021-05-31 NOTE — PROGRESS NOTES
LATE ENTRY FOR 8/7/19    D. Counselor met with patient and patient's parents for 60 minutes to discuss treatment progress. A. Counselor discussed progress that patient has made on abstinence with alcohol and building structure and support for ongoing recovery. Counselor provided psycheducation to patient's parents regarding mental health symptoms and supporting the patient in relation to these symptoms. Counselor discussed plan for patient to move to 2 days starting next week, and potential referral to DBT program after phase II. R. Patient discussed that he feels that the program has been helpful in discontinuing alcohol use and that he working on reducing self-harm, and continuing to work with his therapist on this issue. Patient's parent asked what can be supportive, patient agreed to inform his parents when he is having urges to self-harm. Patient's parents appeared somewhat receptive to counselor's information on mental health and addiction. T. Patient to complete phase I this week and start attending two days next week, counselor and patient to meet 1:1 on 8/9/19.       EVAN Blunt, [facility]  8/8/2019, 11:43 AM

## 2021-05-31 NOTE — PROGRESS NOTES
D. Counselor met with patient for 75 minutes to discuss treatment progress. A. Counselor assessed for SI and willing/ableness to follow safety plan. Counselor discussed with patient his increase in depressive symptoms, particularly decreased appetite and increased sleeping. Counselor provided psycheducation on radical acceptance, and discussed that some of the patient's existential quesitons may not be answerable at this point. Counselor encouraged patient to continue to reach out to others and seek emergency services as necessary. R. Patient reported minimal suicidal ideation, no intent and reported willingness and ability to follow safety plan. Patient reported that he had been sleeping more over the weekend and has very little appetite. Patient reported that he is meeting with his psychiatrist on 9/7/19 and that he agreed to discuss his symptoms. Patient reported that he has been struggling with the concept of radical acceptance, but had better understanding of it after counselor discussed it during this session. Patient reported that he is having difficulty seeing the point of life and is struggling with the not knowing for sure what the purpose is. Patient reported he will continue to reach out for support, and reiterated at the end that he is willing to follow safety plan. T. Patient to continue attending group, counselor to continue to assess for SI. Counselor consulted with clinical supervisor, with plan to communicate with patient's psychiatrist regarding increase in depressive symptoms.        EVAN Blunt, [facility]  9/3/2019, 4:51 PM

## 2021-05-31 NOTE — PROGRESS NOTES
Cong Tuttle attended 2 hours of group today, counselor met with patient during the last hour of group.     The group topic was Stress Management, patient was responsive to topic.     Patients engagement in the group session: medium     Total group size: 12      VALENCIA Blunt  8/12/2019, 4:46 PM

## 2021-05-31 NOTE — PROGRESS NOTES
Counselor checked in briefly with patient following group. Patient reported minimal SI, no intent and reported continued willingness to follow safety plan. Patient reported he is being more mindful about suicidal urges when by lightrail, continuing to keep knives and belts out of apartment. Patient reported that he communicated with a close friend last night that he is experience SI, and in agreement to keep in touch with friend over the weekend. Patient agreed to plan to check with counselor on 9/3/19 via phone and meet with counselor at 2pm on the same date.     EVAN Blunt, [facility] [place]  8/30/2019, 1:59 PM

## 2021-05-31 NOTE — PROGRESS NOTES
WHITNEY. Counselor met with patient for 90 minutes to discuss treatment progress. A. Counselor inquired about patient's thoughts about how the family session went, and discussed accepting where the patient's father is in terms of support. Counselor encouraged patient to follow through with plans with friends, and also encouraged patient to check out other recovery meetings. Counselor discussed plan for patient to move down to 2 days per week, and discussed the importance of increasing outside support. Counselor provided psychoeducation on borderline personality disorder, particularly related to attachment in relationship and keeping healthy boundaries. R. Patient reported that he felt that the family meeting went well, that that he was glad that his father attended. Patient discussed that he has difficulty making decisions on his own and frequently asks for his parents approval. Patient reported that he had made tentative plans with a friend but has a urge to cancel. Patient reported that he has stopped attending an AA meeting that he attended in the past due to not feeling that his sponsor is a good fit, agreed with plan to try out new meetings. Patient reported understanding plan to move to 2 days per week, reported that he still wanted to attend 3 days. Patient discussed difficulty accepting the temporary-ness of relationship and urge to move from over attaching to not attaching at all, appeared receptive to counselor's feedback about finding the middle ground between the two extremes. T. Patient to start attending 2x per week starting next week, 1:1 scheduled on 8/15/19.     Patient treatment was reviewed. Changes were made. Patient was actively and directly involved in the treatment plan review and updates.      EVAN Blunt, [facility]  8/9/2019, 4:07 PM

## 2021-05-31 NOTE — PROGRESS NOTES
"Patient reported his emotions was \"empty\" and appeared flat and less engaged that normal. Counselor met with patient for 20 minutes after group to assess suicide risk. Patient reported that over the weekend he attempted suicide by putting a belt around his neck and attaching it to the door, but released it before losing consciousness. Patient reported that this was an impulsive action and that he did not think of his safety plan. Patient reported no current plans of suicide and no intent. Patient reported understanding of mandated reporting, reported frustration with system.  Patient reported willingness to follow safety plan and present to emergency services as necessary. Patient agreed to meet with counselor at 9am on 8/27/19, if the patient does not present, counselor will contact patient. If patient is unable to be reached, counselor will call emergency services to do a health and wellness check. Patient agreed to call counselor or present to group on 8/28/19, if patient does not present or contact counselor, emergency services will be contacted to do a health and wellness check. Counselor reported plan to follow up with patient's therapist, which patient was in agreement with. Patient was given two copies of safety plan, one to post at home and one to keep with him at all times. Counselor meet with patient at 9am on 8/27/19.     EVAN Blunt, DANNY  8/26/2019, 12:45 PM    "

## 2021-05-31 NOTE — PROGRESS NOTES
Cong Tuttle attended 3 hours of group today.     The group topic was Stress Management, patient was responsive to topic.     Patients engagement in the group session: medium     Total group size: 7      VALENCIA Blunt  8/23/2019, 12:13 PM

## 2021-05-31 NOTE — PROGRESS NOTES
WHITNEY. Counselor met with patient for 70 minutes to address current SI.  A. Counselor assessed for current SI, discussed ways that the patient can make himself safer including removing knives and belts from inside his home. Counselor asked patient if he is willing and able to follow his safety plan. Counselor also asked patient if there are people that he can reach out to for support and encouraged patient to make these phone calls. Counselor also encouraged patient to contact his psychiatrist regarding his SI. Counselor requested patient's current address in case a health and wellness call will need to be made. Counselor encouraged patient to follow these goals for the day, additionally to get out of his apartment and go to the coffee shop downstairs. Counselor thanked patient for his honesty regarding his symptoms and reaffirmed desire to help patient. R. Patient reported he hasn't experienced any SI on this date, and reported no intent at this time. Patient reports willingness to follow safety plan and remove belts and knives from apartment. Patient reported plan to contact his psychiatrist and his friends on this date. Patient reports that he has been struggling to do things after work, tendency to watch TV instead of going somewhere. Patient made goal to go to coffee shop in the first floor of his building. T. Patient to attend group on 8/28/19, counselor to re-assess for SI and follow up with patient regarding goals at that time.     EVAN Blunt, [facility]  8/27/2019, 11:32 AM

## 2021-05-31 NOTE — PROGRESS NOTES
WHITNEY. Counselor met with patient for 110 minutes to discuss treatment progress. A. Counselor inquired about what the patient would like to put on the agenda for meeting with his parents next week. Counselor expressed concerns related to the patient giving staff and other group members presents, asking the patient what the intention was with giving the presents. Counselor additionally expressed concern that the patient has past history of giving gifts or doing things for people in order to jump start relationships. Counselor discussed the importance in maintaining the boundaries of a professional relationship with staff and asked that the patient not give any other gifts to other patients while in treatment. Counselor encouraged patient to remember that change is part of life, and learning to experience gratitude and acceptance of this. Counselor discussed potential referral to DBT skills group after finishing phase II in order for the patient to learn more skills to regulate emotions and work with attachment issues. R. Patient reported that he is looking forward to the family meeting, and plans to ask his parents if there is anything in particular that they want to talk about. Patient reported that the gifts were meant as a way to appreciate staff, was receptive to feedback about past history of buying things for others. Patient reported feeling embarrassed, but reported understanding counselor concerns. Patient reported that he has difficulty with change and that it made him sad that relationships dont last forever. Patient asked why emotions and attachment is difficult for him, reported that he would be interested in a DBT group. T. Patient to continue with phase I through next week, family conference scheduled for 8/7/19 and individual session on 8/8/19.     Patient treatment was reviewed. Changes were not made. Patient was actively and directly involved in the treatment plan review and updates.     Elinor Pandya,  VALENCIA GORDON  8/1/2019, 4:46 PM

## 2021-05-31 NOTE — PROGRESS NOTES
Cong Tuttle attended 3 hours of group today.     The group topic was Dual Diagnosis I, patient was responsive to topic.     Patients engagement in the group session: high     Total group size: 8      VALENCIA Wood  8/30/2019, 12:36 PM

## 2021-05-31 NOTE — PROGRESS NOTES
WHITNEY. Counselor met with patient for 65 minutes to discuss treatment progress. A. Counselor inquired about how not attending on 8/14/19 felt for the patient and what coping skills the patient has been utilizing. Counselor presented information to patient about wise mind, and that the patient tends to spend most of his time in emotional or rational mind, with the goal of getting to wise mind. Counselor reflected with patient on how it appears that the may be directing anger internally and through self-harm. Counselor processed with patient his feelings towards his supervisor and validated the feeling that the patient has related to his supervisor. Counselor encouraged patient to consider what rules he holds for himself that he does not hold for other people. R. Patient reported that it was less difficult last night, and reported that he has been journaling which was helpful. Patient appeared receptive to counselor's feedback about anger, and identified that he can't remember the last time he felt angry. Patient endorsed that when he feels frustrated at other people he will blame these feelings on himself, and related his feelings related to his supervisor to his feelings of rejection from women. T. Patient to continue with phase II, next 1:1 scheduled on 8/22/19.     Patient treatment was reviewed. Changes were not made. Patient was actively and directly involved in the treatment plan review and updates.     EVAN Blunt, [facility]  8/15/2019, 11:48 AM

## 2021-05-31 NOTE — PROGRESS NOTES
Cong Tuttle attended 3 hours of group today.     The group topic was Dual Diagnosis I, patient was responsive to topic.     Patients engagement in the group session: low     Total group size: 9      VALENCIA Blunt  8/26/2019, 12:35 PM

## 2021-05-31 NOTE — PROGRESS NOTES
Weekly Progress Note  Cong Tuttle  1990  [id number]      D) Pt attended 3/2 groups this week with no absences, patient is in phase II. Patient attended 2 individual sessions this week on 8/27/19 and 8/29/19.   A) Staff facilitated groups and reviewed tx progress. Assessed for [facility]. R) No VAP needed at this time.     Any significant events, defines as events that impact patients relationship with others inside and outside of treatment: Yes: patient was released from hospital on 6/10/19. Patient reported that another patient had requested to move into his apartment (see 1:1 appointment on 7/3/19). Patient has communicated to the other patient that she is not allowed to move in, this patient to communicate to staff if there are any more requests from the other patient. Patient reported on 8/26/19 that he had suicide attempt on 8/25/19 via strangulation (see documation).   Indicate any changes or monitoring of physical or mental health problems: Yes: patient reports ongoing self harm, last reported on 8/30/19. Patient reported SA and increased SI this week, reported continued commitment to following safety plan.   Indicate involvement by any outside supports: Yes: patient has a sponsor, psychiatrist in [place], therapist at [facility].  IAPP reviewed and modified as needed: NA    Pt working on the following dimensions:  Dimension #1 - Withdrawal Potential - Risk 0. Patient reports last use of alcohol on 6/5/19 at intake, most recent pattern of use has been 3-9 drinks 2-4x per week. Patient was not requested to complete a UA on 6/19/19 which was positive for benzodiazepines, patient is currently prescribed klonopin, did not indicate recent alcohol use. Patient reported having two glasses of wine on 6/21/19. Patient reported drinking a beer on 7/8/19. Patient was requested to complete a UA on 7/5/19, results indicated that it was diluted. Patient was requested to complete another UA on 7/8/19, which  was negative for all tested substances, including patient's prescribed benzodiazepines. These results were reviewed with patient, patient presented current medication, reported he has been taking it, and also reports that he has been drinking large amounts of fluids, with restricted eating during the day, which may be effective results. Patient was requested to complete a UA on 8/5/19 which was positive for benzodiazepines, which the patient is currently prescribed. Patient does not report or display any withdrawal symptoms at this time.   Specific goals from treatment plan addressed this week:  Patient reported no substance use this week. Patient was not requested to complete a UA this week.   Effectiveness of strategies:  Strategies appear effective.   Dimension #2 - Biomedical - Risk 0. Patient reports no current health concerns, but does report restricting his eating. Patient has medical insurance and is able to access medical care as needed.   Specific goals from treatment plan addressed this week:  Patient reported no changes to physical health this week.   Effectiveness of strategies:  Strategies appear effective.   Dimension #3 - Emotional/Behavioral/Cognitive - Risk 2. Patient reports mental health diagnoses of major depressive disorder, generalized anxiety disorder, and borderline personality disorder. Patient reports that he does currently have psychiatry services that he regularly attends (weekly currently). Patient denies any other current mental health services, but does verbalize a desire to obtain therapy services. Patient endorses current mental health medications. Patient was recently discharged from a stay on a mental health unit due to suicidal ideation. Patient has a long history of struggling with suicidal ideation and attempts. Patient endorses active struggles with food restriction, as well as self-harm. Patient states that he greatly struggles with impulse control at this time, and concern  of engaging more in self harm when not drinking. Patient denies any current suicidal or homicidal thoughts or plans. Patient is oriented x4.   Active interventions to stabilize mental health symptoms:  Patient reports taking medications as prescribed, attending weekly therapy. Counselor checking in with patient at each appointment regarding SIB and SI, patient continues to be in agreement to follow safety plan. Patient reported SA and increased SI this week, met with counselor to address safety concerns.   Specific goals from treatment plan addressed this week:  Patient processed increased SI with counselor this week, discussed concerns with group. Patient also reported that he is working on his goal to decrease isolation, is reaching out for support to friends outside of group. Patient also working on goal of having better boundaries with other group members.   Effectiveness of strategies:  Strategies appear effective, staff to continue to monitor.   Dimension #4 - Readiness for Change - Risk 0. Patient verbalizes that he feels that he is here on his own regard, despite being recommended by his psychiatrist. Patient does verbalize that he believes his use is problematic, and that he wants help to change at this time. Patient verbalizes willingness to follow all recommendations made for him while in treatment. Patient appears genuinely motivated for treatment and change at this time.  Specific goals from treatment plan addressed this week:  Patient attended 3/2 groups this week (increased attendance due to safety concerns), and attended 2/2 scheduled individual appointments.   Effectiveness of strategies:  Strategies appear effective.   Dimension #5 - Relapse Potential - Risk 3. Patient reports regular use of alcohol up until 6/5/19, and so he therefore may currently lack the necessary coping skills to help him prevent relapse at this time. He indicated that he struggles with impulse control which could indicate an  increased risk of relapse at this time. Patient does report some past treatment experience and a history of periods of sustained sobriety. He therefore may have some knowledge of the skills needed to maintain sobriety, however it does not appear that he has been utilizing those skills in the past 6 months. Patient's lack of current skills, mental health, and impulse control issues appear to indicate an increased risk of relapse at this time. Patient has had 2 relapses in outpatient treatment.   Specific goals from treatment plan addressed this week:  Patient reported minimal urges and no use of substances this week.   Effectiveness of strategies:  Strategies appear effective.   Dimension #6 - Recovery Environment - Risk 2. Patient currently works full-time, and do he does appear to have some structure for his daily life. Patient struggled to identify sober hobbies, so he does appear to lack meaningful activity outside of work at this time. Patient states that he currently has a stable place to live, however it is not always the most supportive as he sometimes isolates. Patient indicated peer support for sobriety, and that he has been attending support groups, so he does have some support for his sobriety. He states that his family does not know about his use currently, and that they would not be supportive of him if they were made aware. Patient denies any current legal involvement.   Specific goals from treatment plan addressed this week:  Patient reported that he is continuing to struggle with getting out of his apartment on weeknights and on the weekend, but is attempting to be more social. Patient did not indicate any support group attendance this week.   Effectiveness of strategies:  Strategies appear effective.   T) Treatment plan updated: no  Patient notified and in agreement: N/A   Patient educated on Dual Diagnosis I. Patient has completed 115 of 90 program hours at this time, patient is in phase II.  Patient is expected to complete phase II on 9/13/19, and start phase III on 9/17/19. Current discharge plan is phase III/ program.     EVAN Blunt, [facility]  8/30/2019, 2:16 PM       Weekly Educational Topics Date   1. Dual Diagnoses, week 1 8/26, 8/28, 8/30   2. Dual Diagnoses, week 2    3. Stress Management 8/12, 8/16 8/19, 8/23   4. Feelings/Emotions    5. Thinking 6/14   6. Change 6/17, 6/19, 6/21   7. Recovery Support 6/24, 6/26, 6/28   8. Relationships/Communication 7/1, 7/3, 7/5   9. Addiction 101 7/8, 7/10, 7/12   10. Relapse Prevention 7/15, 7/17, 7/19   11. Grief and Loss 7/22, 7/24, 7/26   12. Strengths 7/29, 7/31, 8/2   13. Wellness 8/5, 8/7, 8/9   Mindfulness  6/14, 6/21, 6/28, 7/5, 7/12, 7/19, 7/26, 8/2, 8/9, 8/16, 8/30

## 2021-05-31 NOTE — PROGRESS NOTES
Mental Health Visit Note    08/23/2019    Start time: 230  Stop Time: 325   Session # 6    Session Type: Patient is presenting for an Individual session.    Cong Tuttle is a 28 y.o. male is being seen today for    Chief Complaint   Patient presents with      Follow Up     Self-esteem     Present For Session: Patient and therapist     New symptoms or complaints: None    Functional Impairment:   Personal: 3  Family: 3  Work: 2  Social:3    Clinical assessment of mental status: Mr. Tuttle presented on time for scheduled session today.  He was open and cooperative, and dressed appropriately for this session and weather. His memory was good for both short and long term.  His speech and language was soft and concise throughout the session.  Concentration and focus is within normal limits. Psychosis is not noted or reported. He reports his mood is down.  Affect is congruent with speech.  Fund of knowledge is adequate. Insight is adequate for therapy. Reviewed but no changes for this session.     Suicidal/Homicidal Ideation present: Patient reported having thoughts of suicide due to feeling he is a burden to his father. Patient denied current suicidal ideations, plans and/or means. Patient agreed to call 911 and/or report to ER if suicidal ideations were to occur.     Patient's impression of their current status: Patient indicated feeling down. Patient reported having ups and downs this week. Patient indicated one of the downs being related to contacting someone he was not suppose to anymore. Patient reported he does not like talking about it due to shame. Patient indicated he also feels down due to feeling like a burden to his father. Patient reported asking if he worries as much about his brother but does not believe that he actually does. Patient indicated struggling to feel so negative to him family and needing change. Patient reported he feels ugly and that related a lot to his self-harm.     Therapist  impression of patients current state: Patient appears to have fair insight into his mental health. This therapist processed with patient ways to get closure from the relationship without contacting the person. This therapist talked with patient about a good-bye letter that he does not send. This therapist challenged patient on what would make him feel not ugly. This therapist processed with patient using the wise mind to help him identify ways there is more to his relationship with his dad then being a burden. This therapist went over safety plan with patient and he agreed to call 911 and/or report to ER if suicidal ideations were to occur.    Type of psychotherapeutic technique provided: Insight oriented, Client centered and CBT    Progress toward short term goals: Progress as expected with patient returning to scheduled session, using skills taught is session, completed handout attending group and maintaining sobriety.     Review of long term goals: Treatment Plan Updated on 07/26/2019    Diagnosis:   1. Major depressive disorder, recurrent episode, moderate (H)    2. JOSSIE (generalized anxiety disorder)    3. Moderate alcohol use disorder (H)      Plan and Follow up: Patient appears to have fair insight into his mental health. Patient would benefit from identifying what he wants in a friendship. Patient should work on using skills taught in session to work on reducing self-harm. Patient would benefit from using skills taught in session to help with the transition of group at this time.Patient should work on identifying ways he feels about himself other then ugly. Patient should call 911 and/or report to ER if suicidal ideations were to occur.     Discharge Criteria/Planning: Patient will continue with follow-up until therapy can be discontinued without return of signs and symptoms.    Maria Del Rosario Anand 8/23/2019

## 2021-05-31 NOTE — PROGRESS NOTES
Cong Tuttle attended 3 hours of group today.     The group topic was Wellness, patient was responsive to topic.     Patients engagement in the group session: medium     Total group size: 10      VALENCIA Blunt  8/5/2019, 2:51 PM

## 2021-05-31 NOTE — PROGRESS NOTES
Cong Tuttle attended 3 hours of group today.     The group topic was Stress Management, patient was responsive to topic.     Patients engagement in the group session: high     Total group size: 10      VALENCIA Blunt  8/16/2019, 3:00 PM

## 2021-05-31 NOTE — PROGRESS NOTES
Cong Tuttle attended 3 hours of group today.     The group topic was Wellness, patient was responsive to topic.     Patients engagement in the group session: high     Total group size: 11      VALENCIA Wood  8/7/2019, 1:35 PM

## 2021-05-31 NOTE — PROGRESS NOTES
Cong Tuttle attended 3 hours of group today.     The group topic was Stress Management, patient was responsive to topic.     Patients engagement in the group session: medium     Total group size: 8      VALENCIA Blunt  8/19/2019, 4:30 PM

## 2021-05-31 NOTE — PROGRESS NOTES
Cong Tuttle attended 3 hours of group today.     The group topic was Strengths, patient was responsive to topic.     Patients engagement in the group session: high     Total group size: 9      VALENCIA Blunt  8/2/2019, 2:27 PM

## 2021-05-31 NOTE — PROGRESS NOTES
D. Counselor met with patient for 60 minutes to discuss patient's distress related to moving to 2 days per week.  A. Counselor inquired about the patient's emotions related to moving to days per week, normalized patient's feelings of loss. Counselor inquired about patient's self-harm over the weekend, assessed for SI. Counselor encouraged patient to practice self-care and compassion over the next couple days and indicated that the patient can call the counselor to check in over the next couple days. R. Patient reported feeling significant loss over moving two days per week, and fear related to relationships in treatment ending eventually. Patient reported self harm on Friday, Saturday and Sunday, reported having difficulty allowing for difficult feelings. Patient reported some SI urges last week, reports no current intent and minimal SI. Patient reported understanding goal to be compassionate to himself, reported it would be helpful to check-in with counselor. T. Patient to check in with counselor via phone as needed, 1:1 scheduled on 8/15/19.       EVAN Blunt, [facility]  8/12/2019, 4:55 PM
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
## 2021-05-31 NOTE — PROGRESS NOTES
WHITNEY. Counselor met with patient for 80 minutes to discuss treatment progress. A. Counselor inquired about how the week had been going for the patient. Counselor challenged patient's feelings of shame and guilt, and encouraged patient to attempt to talk to himself more compassionately, particularly as the negative self-talk appears to be related to self-harm. Counselor assessed for SI. Counselor also encouraged patient to be aware of progress that has been made, versus considering the ideal. Counselor discussed plan for patient to start phase III group in a few weeks with recommendation that around the same time the patient would start the DBT group. Counselor provided patient with contact information for [facility] for their DBT program. R. Patient reported feeling upset about his reaction to multiple situations, particularly related to not being assertive. Patient reported self-harming earlier in the day related to these issues. Patient reports some urges related to SI, but denied intent, and reported willingness to follow safety plan. Patient reported that he has difficulty imagining his life continuing to be so difficult. Patient was somewhat receptive to counselor's feedback about his progress. Patient received [facility] contact information, reporting interest in referral. T. Patient to continue with phase II, next 1:1 scheduled on 8/29/19.     Patient treatment was reviewed. Changes were not made. Patient was actively and directly involved in the treatment plan review and updates.     EVAN Blunt, [facility]  8/22/2019, 4:55 PM

## 2021-05-31 NOTE — PROGRESS NOTES
Cong Tuttle attended 3 hours of group today.     The group topic was Wellness, patient was responsive to topic.     Patients engagement in the group session: high     Total group size: 10      VALENCIA Wood  8/9/2019, 12:10 PM

## 2021-05-31 NOTE — PROGRESS NOTES
Mental Health Visit Note    08/16/2019    Start time: 232  Stop Time: 329   Session # 5    53+ session due to the severity of patient's symptoms at this time.     Session Type: Patient is presenting for an Individual session.    Cong Tuttle is a 28 y.o. male is being seen today for    Chief Complaint   Patient presents with     MH Follow Up     Self-harm     Present For Session: Patient and therapist     New symptoms or complaints: None    Functional Impairment:   Personal: 3  Family: 3  Work: 2  Social:3    Clinical assessment of mental status: Mr. Tuttle presented on time for scheduled session today.  He was open and cooperative, and dressed appropriately for this session and weather. His memory was good for both short and long term.  His speech and language was soft and concise throughout the session.  Concentration and focus is within normal limits. Psychosis is not noted or reported. He reports his mood is down.  Affect is congruent with speech.  Fund of knowledge is adequate. Insight is adequate for therapy. Reviewed and changes made as needed.     Suicidal/Homicidal Ideation present: None Reported This Session    Patient's impression of their current status: Patient reported feeling down. Patient indicated this being a tough week for him. Patient reported he stepped down to 2 days a week in [facility]. Patient indicated he found being at group one less day very challenging. Patient reported in a way feeling like he did not belong and was missing out. Patient indicated since he is not going to group one day he is at work more. Patient reported not enjoying his job so having to spend more time at work a negative as well. Patient indicated he engaged in self-harm throughout the week.    Therapist impression of patients current state: Patient appears to have fair insight into his mental health. This therapist processed with patient ways he has handled change in the past. This therapist challenged patient on ways  he can use his skills to help him with the change that is occurring and seeing the positive. This therapist processed with patient other coping skills that can be used to help reduce his self-harm.     Type of psychotherapeutic technique provided: Insight oriented, Client centered and CBT    Progress toward short term goals: Progress as expected with patient returning to scheduled session, using skills taught is session, completed handout attending group and maintaining sobriety.     Review of long term goals: Treatment Plan Updated on 07/26/2019    Diagnosis:   1. Major depressive disorder, recurrent episode, moderate (H)    2. JOSSIE (generalized anxiety disorder)    3. Moderate alcohol use disorder (H)      Plan and Follow up: Patient appears to have fair insight into his mental health. Patient would benefit from identifying what he wants in a friendship. Patient should work on using skills taught in session to work on reducing self-harm. Patient would benefit from using skills taught in session to help with the transition of group at this time.    Discharge Criteria/Planning: Patient will continue with follow-up until therapy can be discontinued without return of signs and symptoms.    Maria Del Rosario Anand 8/20/2019

## 2021-05-31 NOTE — PROGRESS NOTES
Mental Health Visit Note    08/30/2019    Start time: 231  Stop Time: 326   Session # 7    Session Type: Patient is presenting for an Individual session.    Cong Tuttle is a 28 y.o. male is being seen today for    Chief Complaint   Patient presents with     MH Follow Up     Depression     Present For Session: Patient and therapist     New symptoms or complaints: None    Functional Impairment:   Personal: 3  Family: 3  Work: 2  Social:3    Clinical assessment of mental status: Mr. Tuttle presented on time for scheduled session today.  He was open and cooperative, and dressed appropriately for this session and weather. His memory was good for both short and long term.  His speech and language was soft and concise throughout the session.  Concentration and focus is within normal limits. Psychosis is not noted or reported. He reports his mood is depressed.  Affect is congruent with speech.  Fund of knowledge is adequate. Insight is adequate for therapy. Reviewed but no changes for this session.     Suicidal/Homicidal Ideation present: Patient reported no suicidal ideations, plans and/or means at this time. Patient did indicated last Sunday putting a belt around his neck but taking it off before losing conciseness. Patient reported he has a safety plan with ORQUIDEA Blunt LPC and has copies of his safety plan. Patient agreed if any suicidal ideations, plans and or means were to present he will follow safety plan and/or call 911 and/or report to ER.     Patient's impression of their current status: Patient reported feeling depressed. Patient indicated he is not where he wants to be in life. Patient reported being unsure exactly what he wants in life at this time. Patient indicated knowing suicide is not the answer. Patient reported on Sunday when he put a belt around his neck taking it off before losing conciseness knowing he does want to live. Patient indicated life being hard right now for him due to being  unsure of his path. Patient reported he feels a lot of pressure to be a certain way for his parents but unsure if that is who he wants to be in life. Patient indicated the last time he remembers feeling happy is when he had a girlfriend. Patient reported wanting to be in a relationship but knowing he needs to care for himself. Patient denied current suicidal ideations, plans and/or means.     Therapist impression of patients current state: Patient appears to have fair insight into his mental health. This therapist went over patient's safety plan developed with his CD counselor which patient agreed to follow. This therapist processed with patient steps he has made that he is proud of in his life. This therapist went over emotional regulation and being able to identify ways this affects his thinking and feeling.      Type of psychotherapeutic technique provided: Insight oriented, Client centered and CBT    Progress toward short term goals: Progress as expected with patient returning to scheduled session, using skills taught is session, completed handout attending group and maintaining sobriety.     Review of long term goals: Treatment Plan Updated on 07/26/2019    Diagnosis:   1. Major depressive disorder, recurrent episode, moderate (H)    2. JOSSIE (generalized anxiety disorder)    3. Moderate alcohol use disorder (H)      Plan and Follow up: Patient appears to have fair insight into his mental health. Patient would benefit from identifying what he wants in a friendship. Patient should work on using skills taught in session to work on reducing self-harm. Patient would benefit from using skills taught in session to help with the transition of group at this time.Patient should work on identifying ways he feels about himself other then ugly. Patient should call 911 and/or report to ER if suicidal ideations were to occur.     Discharge Criteria/Planning: Patient will continue with follow-up until therapy can be  discontinued without return of signs and symptoms.    Maria Del Rosario Backer 9/3/2019

## 2021-05-31 NOTE — PROGRESS NOTES
Individual Treatment Plan    Patient  Name: Cong Tuttle  MRN: [id number]   : 1990  Admit Date: 19  Date of Initial Service Plan: 19  Tentative Discharge Date: 19    Diagnosis: Alcohol Use Disorder, Moderate (F10.20)    Counselor: Elinor Pandya, EVAN, VALENCIA      Dimension 1: Acute Intoxication/Withdrawal Potential, Risk level: 0    Problem Statement from Comprehensive Assessment:  Patient reports weekly alcohol use, with his last use being on 19. Patient did recently get discharged from a mental health unit, and so was monitored for potential withdrawal while there. Patient denies any current withdrawal symptoms or concerns. Patient shows no physical signs or symptoms of intoxication or withdrawal. Patient may be at some risk of withdrawal if he discontinues his anti-anxiety medication. Patient does not appear to be at risk of severe withdrawal at this time. Patient is oriented x4.     Problem: Patient's last use of alcohol on 19.  Goal: Patient to maintain abstinence throughout outpatient treatment.   Must be reached to complete treatment? Yes  Methods/Strategies (must include amount and frequency):   1. Patient to report any substance/alcohol use to counselor to determine if any changes need to be made to address withdrawal symptoms.   2. Patient to complete any requested UAs.   Target Date: 19  Completion Date:        Dimension 2: Biomedical Conditions/Complications, Risk level: 0    Problem Statement from Comprehensive Assessment:  Patient denies any current health issues or concerns. He does not currently have a PCP, but does have insurance and so appears able to get his medical needs met and addressed as necessary. Patient denies any immediate medical needs or concerns. He appears to be in adequate physical health and functioning at this time.     Problem: Patient reports good physical health.   Goal: Patient to maintain stable health throughout outpatient treatment.    Must be reached to complete treatment? No  Methods/Strategies (must include amount and frequency):   1. Patient to report any changes to physical health to counselor.   2. Patient to attend all scheduled doctor s appointments.   3. Patient to take medications as prescribed.  4. Patient to establish care with a primary care clinic.    Target Date: 9/20/19  Completion Date:     Problem: Patient reports current tobacco use.   Goal: Patient to receive information about smoking cessation.   Must be reached to complete treatment? No  Methods/Strategies (must include amount and frequency):   1. Staff to provide patient with nicotine cessation information and help on how to quit use.   2. Patient to report any progress on stopping nicotine use to staff.   Target Date: 9/20/19  Completion Date:       Dimension 3: Emotional/Behavioral/Cognitive, Risk level: 2    Problem Statement from Comprehensive Assessment:  Patient reports mental health diagnoses of major depressive disorder, generalized anxiety disorder, and borderline personality disorder. Patient reports that he does currently have psychiatry services that he regularly attends (weekly currently). Patient denies any other current mental health services, but does verbalize a desire to obtain therapy services. Patient endorses current mental health medications. Patient was recently discharged from a stay on a mental health unit due to suicidal ideation. Patient has a long history of struggling with suicidal ideation and attempts. Patient endorses active struggles with food restriction, as well as self-harm. Patient states that he greatly struggles with impulse control at this time, and concern of engaging more in self harm when not drinking. Patient denies any current suicidal or homicidal thoughts or plans. Patient is oriented x4.     Problem: Patient reports mental health diagnoses of depression, anxiety and borderline personality disorder.   Goal: Patient to manage  mental health.   Must be reached to complete treatment? No  Methods/Strategies (must include amount and frequency):   1. Patient to meet with counselor or other  staff to complete a diagnostic assessment for mental health. Patient to discuss with primary counselor potential referrals.  Target Date: 9/20/19  Completion Date: 6/13/19  2. Patient to begin using coping skills learned in therapeutic group (such as grounding, breathing, thought challenging, mindfulness, etc), and share in daily check-in any benefits or challenges that you experience using these skills.  3. Patient to attend all psychiatry appointments.   4. Patient to take all medications as prescribed.   5. Patient to attend all therapy appointments, contact mental health clinic if unable to attend ([phone])   Target Date: 9/20/19  Completion Date:     Problem: Patient struggles with suicidal ideation and self harm.   Goal: Patient to manage urges to self harm.   Must be reached to complete treatment? No  Methods/Strategies (must include amount and frequency):   1. Patient to develop a safety plan if necessary with primary counselor, and report to counselor immediately of any self harm or suicidal thoughts.   2. Patient to work with counselor on urges to restrict food, patient to identify small goals to increase daily calorie consumption, share benefits and challenges you experience.   3. Patient to attempt to utilize coping skills before engaging in self-harm, and report all self-harm to counselor.   3. Patient to seek medical attention if necessary.   Target Date: 9/20/19  Completion Date:       Dimension 4: Readiness to Change, Risk level 0    Problem Statement from Comprehensive Assessment:  Patient verbalizes that he feels that he is here on his own regard, despite being recommended by his psychiatrist. Patient does verbalize that he believes his use is problematic, and that he wants help to change at this time. Patient verbalizes willingness  to follow all recommendations made for him while in treatment. Patient appears genuinely motivated for treatment and change at this time. Patient was calm, cooperative, and appropriately behaved throughout this appointment.     Problem: Patient reports motivation for treatment and sobriety.   Goal: Patient to increase motivation towards recovery by participating in outpatient programming.   Must be reached to complete treatment? Yes  Methods/Strategies (must include amount and frequency):   1. Patient to attend 3, 3-hour groups per week and all scheduled 1:1s.  Target Date: 9/20/19  Completion Date: 8/9/19  2. Patient to attend 2, 3-hour groups per week and all scheduled 1:1s.   3. Patient will contact staff if unable to attend (Elinor [phone], Ramona [phone]).  4. Patient will identify weekly and daily goals to help increase motivation and engagement in recovery.  Target Date: 9/20/19  Completion Date:       Dimension 5: Relapse/Continued Use/Continued Problem Potential, Risk level: 3    Problem Statement from Comprehensive Assessment:  Patient reports regular use of alcohol up until 6/5/19, and so he therefore may currently lack the necessary coping skills to help him prevent relapse at this time. He indicated that he struggles with impulse control which could indicate an increased risk of relapse at this time. Patient does report some past treatment experience and a history of periods of sustained sobriety. He therefore may have some knowledge of the skills needed to maintain sobriety, however it does not appear that he has been utilizing those skills in the past 6 months. Patient's lack of current skills, mental health, and impulse control issues appear to indicate an increased risk of relapse at this time.     Problem: Patient lacks coping skills to prevent relapse.   Goal: Patient to gain skills to prevent relapse.   Must be reached to complete treatment? Yes  Methods/Strategies (must include amount  and frequency):   1. Patient to share in daily check-in any urges and addictive thinking to better understand his pattern of use and to prevent relapse in the future.  2. Patient to identify at least 5 personal triggers and high-risk situations for relapse.  3. Patient to learn and implement at least 5 personal coping strategies to manage urges to use.  4. Patient to identify 5 beliefs and/or behaviors that have blocked him from staying sober in the past. Identify at least 5 alternative thoughts and behaviors more conducive to recovery  Target Date: 9/20/19  Completion Date:       Dimension 6: Recovery Environment, Risk level: 2    Problem Statement from Comprehensive Assessment:  Patient currently works full-time, and do he does appear to have some structure for his daily life. Patient struggled to identify sober hobbies, so he does appear to lack meaningful activity outside of work at this time. Patient states that he currently has a stable place to live, however it is not always the most supportive as he sometimes isolates. Patient indicated peer support for sobriety, and that he has been attending support groups, so he does have some support for his sobriety. He states that his family does not know about his use currently, and that they would not be supportive of him if they were made aware. Patient denies any current legal involvement.     Problem: Lack of sober support   Goal: Patient to expand sober support.  Must be reached to complete treatment? yes  Methods/Strategies (must include amount and frequency):   1. Explore and identify at least 2 sober support meetings to regularly attend.  2. Patient to identify at least 1 person from a meeting that he would like to get to know better. Patient to discuss with counselor ways that he can work to build friendship with this person.  Target Date: 9/20/19  Completion Date:     Problem: Patient lacks sober activity.  Goal: Patient to develop sober hobbies.   Must be  reached to complete treatment? Yes  Methods/Strategies (must include amount and frequency):   1. Identify activities that peak interest. Maintain consistent attendance.  2. Patient to identify weekly and daily activities he does to maintain his recovery. Share with counselor.   3. Patient to identify new places in the [facility] that he has interest in visiting. Patient to share with counselor/group any challenges or benefits you experience.    Target Date: 9/20/19  Completion Date:       Resources  Resources to which the patient is being referred for problems when problems are to be addressed concurrently by another provider: Psychiatry, [facility]          By signing this document, I am acknowledging that I was actively and directly involved in the development of my treatment plan.         Patient Signature:_____________________________            Date:_____________     Staff Signature:   EVAN Blunt, [facility] [place]                   Date: 8/12/2019, 8:40 AM

## 2021-06-01 NOTE — PROGRESS NOTES
This therapist called patient back related to his concerns over scheduling. Patient and therapist will discuss further in scheduled session tomorrow (10/3/2019).

## 2021-06-01 NOTE — PROGRESS NOTES
WHITNEY. Counselor met with patient for 35 minutes to discuss safety concerns. A. Counselor assessed current suicidal ideation, assessed for intent, and willingness to follow safety plan. Counselor discussed that if the patient notices that suicidal urges are getting more intense, to contact crisis number or present to emergency services. Counselor encourage patient to remove objects that could threaten safety so that it would be harder to take impulsive action. R. Patient reported experiencing suicidal ideation over the weekend and that he had considered calling the crisis number. Patient indicated that he has been following safety plan, but had not removed belts and knives from apartment, patient agreed to remove these items later on this date. Patient reported that he had informed his mother about the suicide attempt on 8/25/19, and reported plan to talk to his brother about it is evening. Patient reports no current intent, and reported willingness to follow safety plan, calling the crisis number or 911 as necessary. T. Patient to check in with counselor via phone on 9/10/19, 1:1 scheduled on 9/11/19.     EVAN Blunt, [facility]  9/9/2019, 2:04 PM

## 2021-06-01 NOTE — TELEPHONE ENCOUNTER
Counselor returned patient's phone call. Counselor inquired about patient's self-harm on 9/16/19, particularly if there was suicidal intent. Patient reported that he had cut significantly on 9/16/19, reported no suicidal intent, wiling to follow safety plan. Patient reported attending DBT intake appointment, will be starting with DBT therapist on 9/25/19 and starting DBT group on 9/26/19. Patient to meet with counselor on 9/19/19, but will be checking with counselor via phone on 9/18/19.     EVAN Blunt, [facility]  9/17/2019, 4:09 PM

## 2021-06-01 NOTE — PROGRESS NOTES
Weekly Progress Note  Cong Tuttle  1990  [id number]      D) Pt attended 1/1 groups this week with 0 absences, patient is in phase III. Patient attended 1 individual sessions this week on 9/24/19.  A) Staff facilitated groups and reviewed tx progress. Assessed for [facility]. R) No VAP needed at this time.     Any significant events, defines as events that impact patients relationship with others inside and outside of treatment: Yes: patient was released from hospital on 6/10/19. Patient reported that another patient had requested to move into his apartment (see 1:1 appointment on 7/3/19). Patient has communicated to the other patient that she is not allowed to move in, this patient to communicate to staff if there are any more requests from the other patient. Patient reported on 8/26/19 that he had suicide attempt on 8/25/19 via strangulation (see documentation). Patient endorsed having thoughts that suicide was appealing during group on 9/24/19.   Indicate any changes or monitoring of physical or mental health problems: Yes: patient reports ongoing self harm, last reported on 9/19/19. Patient reported continued SI, reported continued commitment to following safety plan.   Indicate involvement by any outside supports: Yes: psychiatrist in [place], therapist at [facility] clinic. Counselor in communication with patient's therapist regarding safety concerns  IAPP reviewed and modified as needed: NA    Pt working on the following dimensions:  Dimension #1 - Withdrawal Potential - Risk 0. Patient reports last use of alcohol on 6/5/19 at intake, most recent pattern of use has been 3-9 drinks 2-4x per week. Patient was not requested to complete a UA on 6/19/19 which was positive for benzodiazepines, patient is currently prescribed klonopin, did not indicate recent alcohol use. Patient reported having two glasses of wine on 6/21/19. Patient reported drinking a beer on 7/8/19. Patient was requested to complete a  UA on 7/5/19, results indicated that it was diluted. Patient was requested to complete another UA on 7/8/19, which was negative for all tested substances, including patient's prescribed benzodiazepines. These results were reviewed with patient, patient presented current medication, reported he has been taking it, and also reports that he has been drinking large amounts of fluids, with restricted eating during the day, which may be effective results. Patient was requested to complete a UA on 8/5/19 which was positive for benzodiazepines, which the patient is currently prescribed. Patient does not report or display any withdrawal symptoms at this time.   Specific goals from treatment plan addressed this week:  Patient reported no substance use this week. Patient was not requested to complete a UA this week.   Effectiveness of strategies:  Strategies appear effective.   Dimension #2 - Biomedical - Risk 0. Patient reports no current health concerns, but does report restricting his eating. Patient has medical insurance and is able to access medical care as needed.   Specific goals from treatment plan addressed this week:  Patient reported no changes to physical health this week. Patient reports continued tobacco product use.   Effectiveness of strategies:  Strategies appear effective.   Dimension #3 - Emotional/Behavioral/Cognitive - Risk 2. Patient reports mental health diagnoses of major depressive disorder, generalized anxiety disorder, and borderline personality disorder. Patient reports that he does currently have psychiatry services that he regularly attends (weekly currently).  Patient endorses current mental health medications. Patient currently sees a therapist. Patient was recently discharged from a stay on a mental health unit due to suicidal ideation. Patient has a long history of struggling with suicidal ideation and attempts. Patient endorses active struggles with food restriction, as well as self-harm.  Patient states that he greatly struggles with impulse control at this time, and concern of engaging more in self harm when not drinking. Patient is oriented x4.   Active interventions to stabilize mental health symptoms:  Patient reports taking medications as prescribed, attending weekly therapy. Counselor checking in with patient at each appointment regarding SIB and SI, patient continues to be in agreement to follow safety plan. Patient reported psychiatry appointment scheduled for 9/20/19.   Specific goals from treatment plan addressed this week:  Patient reported that he has weekly individual DBT psychotherapy Mondays and group on Thursdays. Patient discussed thoughts he has been having regarding suicide in group. He states that he finds in appealing. He was open to feedback from group members that challenged this thinking. Patient reports feeling anxious, caban, frustrated, and melancholic throughout the week and having a hard time focusing. He does report that his depression seems to have decreased again. Patient denied SI during group session. Patient did endorse urges for SIB and stated that he has not been coping well with this urges and has been self-harming. Self-harm continues to be superficial and does not meet criteria for patient being a harm to himself.   Effectiveness of strategies:  Strategies appear effective, staff to continue to monitor.   Dimension #4 - Readiness for Change - Risk 0. Patient verbalizes that he feels that he is here on his own regard, despite being recommended by his psychiatrist. Patient does verbalize that he believes his use is problematic, and that he wants help to change at this time. Patient verbalizes willingness to follow all recommendations made for him while in treatment. Patient appears genuinely motivated for treatment and change at this time.  Specific goals from treatment plan addressed this week:  Patient attended 1/1 groups this week, attended 1/1 individual  appointments. Patient was active during group. Patient made a goal for the week, SIB only 6 days out of the week instead of 7.   Effectiveness of strategies:  Strategies appear effective.   Dimension #5 - Relapse Potential - Risk 2. Patient reports regular use of alcohol up until 6/5/19, and so he therefore may currently lack the necessary coping skills to help him prevent relapse at this time. Patient reports current abstinence since 7/8/19. He indicated that he struggles with impulse control which could indicate an increased risk of relapse at this time. Patient does report some past treatment experience and a history of periods of sustained sobriety. He therefore may have some knowledge of the skills needed to maintain sobriety, however it does not appear that he has been utilizing those skills in the past 6 months. Patient's lack of current skills, mental health, and impulse control issues appear to indicate an increased risk of relapse at this time. Patient has had 2 relapses in outpatient treatment.   Specific goals from treatment plan addressed this week:  Patient reported urges for marijuana use. He states that he has been having these urges anytime the word marijuana gets brought up orwords closely related to marijuana use, even when used in a different context. Patient reported no use this week. Patient reports being able to use ride the wave when having urges.   Effectiveness of strategies:  Strategies appear effective.   Dimension #6 - Recovery Environment - Risk 2. Patient currently works full-time, and do he does appear to have some structure for his daily life. Patient struggled to identify sober hobbies, so he does appear to lack meaningful activity outside of work at this time. Patient states that he currently has a stable place to live, however it is not always the most supportive as he sometimes isolates. Patient indicated peer support for sobriety, and that he has been attending support groups,  "so he does have some support for his sobriety. He states that his family does not know about his use currently, and that they would not be supportive of him if they were made aware. Patient denies any current legal involvement.   Specific goals from treatment plan addressed this week:  Patient reports continuing to work full-time. Patient reported spending time with family and talking to friends over the last week. He also reports that he has been taking his dog and \"real walks\" and has found this an effective way to change his mood.   Effectiveness of strategies:  Strategies appear effective.   T) Treatment plan updated: No.  Patient notified and in agreement: NA  Patient educated on Suicidal Ideation. Patient has completed 139 of 120 program hours at this time, patient is currently in phase III, counselor and patient in agreement to extend treatment. Discharge plan is resources in the community/mental health services.     Ramona Queen MA, LMFT, [facility] [place]  9/26/2019, 9:51 AM       Weekly Educational Topics Date   1. Dual Diagnoses, week 1 8/26, 8/28, 8/30   2. Dual Diagnoses, week 2 9/4, 9/6   3. Stress Management 8/12, 8/16 8/19, 8/23   4. Feelings/Emotions 9/9, 9/11, 9/13   5. Thinking 6/14   6. Change 6/17, 6/19, 6/21   7. Recovery Support 6/24, 6/26, 6/28   8. Relationships/Communication 7/1, 7/3, 7/5   9. Addiction 101 7/8, 7/10, 7/12   10. Relapse Prevention 7/15, 7/17, 7/19   11. Grief and Loss 7/22, 7/24, 7/26   12. Strengths 7/29, 7/31, 8/2   13. Wellness 8/5, 8/7, 8/9   Mindfulness  6/14, 6/21, 6/28, 7/5, 7/12, 7/19, 7/26, 8/2, 8/9, 8/16, 8/30, 9/6, 9/13     Weekly Educational Topics Date   Boundaries    Suicidal ideation 9/24                                                                                    "

## 2021-06-01 NOTE — PROGRESS NOTES
Cong Tuttle attended 2 hours of group today.     The group topic was Suicidal ideation, patient was responsive to topic.     Patients engagement in the group session: high     Total group size: 5      Ramona Queen  9/24/2019, 3:19 PM

## 2021-06-01 NOTE — PROGRESS NOTES
Mental Health Visit Note    9/27/2019    Start time: 901  Stop Time: 958   Session # 11    53+ minutes due to patient's self-harm at this time.     Patient will be seen twice this week due to his severe depression and self-harm.     Session Type: Patient is presenting for an Individual session.    Cong Tuttle is a 28 y.o. male is being seen today for    Chief Complaint   Patient presents with     MH Follow Up     Self-harm     Present For Session: Patient and therapist     New symptoms or complaints: None    Functional Impairment:   Personal: 3  Family: 3  Work: 2  Social:3    Clinical assessment of mental status: Mr. Tuttle presented on time for scheduled session today.  He was open and cooperative, and dressed appropriately for this session and weather. His memory was good for both short and long term.  His speech and language was soft and concise throughout the session.  Concentration and focus is within normal limits. Psychosis is not noted or reported. He reports his mood is down.  Affect is congruent with speech.  Fund of knowledge is adequate. Insight is adequate for therapy. No changes for this session.    Suicidal/Homicidal Ideation present: Patient denied current suicidal ideations, plans and/or means at this time. Patient continues to agree to follow safety plan by calling 911 and/or reporting to ER if suicidal ideations were to occur again.    Patient's impression of their current status: Patient indicated feeling down. Patient reported he is self-harming daily at this point anywhere from 4 to 7 times. Patient indicated he has gone back to cutting now but unsure why he switched from burning. Patient reported his self-harm has nothing to do with wanting to kill himself but more to do with wanting to feel ugly. Patient indicated this is one more thing that he cannot like about himself. Patient reported it also is a way for him to cause pain that he feels he deserves. Patient indicated feeling  rejected by his parents due to them not allowing him to come on a trip with them. Patient reported rejection being something that he know she struggles with in many ways in his life.     Therapist impression of patients current state: Patient appears to have fair insight into his mental health. This therapist processed with patient delaying his response time between self-harm. This therapist challenged patient on ways he feels rejected and then wanting to feel ugly have a correlation. This therapist challenged patient on ways self-harm is not taking away his pain or helping him find happiness.     Type of psychotherapeutic technique provided: Insight oriented, Client centered and CBT    Progress toward short term goals: Progress as expected with patient returning to scheduled session, using skills taught is session,  and maintaining sobriety.     Review of long term goals: Treatment Plan Updated on 07/26/2019    Diagnosis:   1. Major depressive disorder, recurrent episode, moderate (H)    2. JOSSIE (generalized anxiety disorder)    3. Moderate alcohol use disorder (H)      Plan and Follow up: Patient appears to have fair insight into his mental health. Patient would benefit from identifying what he wants in a friendship. Patient should work on using skills taught in session to work on reducing self-harm. Patient would benefit from using skills taught in session to help with the transition of group at this time.Patient would benefit from writing 2 things he likes about himself and what he thinks other people like about him.  Patient should call 911 and/or report to ER if suicidal ideations were to occur.     Discharge Criteria/Planning: Patient will continue with follow-up until therapy can be discontinued without return of signs and symptoms.    Maria Del Rosario Backer 9/27/2019

## 2021-06-01 NOTE — PROGRESS NOTES
Cong Tuttle attended 3 hours of group today. Patient reported no current suicidal plan and reports willingness and ability to follow safety plan.     The group topic was Dual Diagnosis II, patient was responsive to topic.     Patients engagement in the group session: high     Total group size: 12      VALENCIA Blunt  9/6/2019, 2:16 PM

## 2021-06-01 NOTE — TELEPHONE ENCOUNTER
Pt called asking to speak with Backer. Pt sees her Friday and Backer was going to attempt to get pt in on Wednesday as well. Waiting to hear back from supervisor if ok to schedule. Backer left pt a message and requested a call back. Contact pt regarding scheduling issues.

## 2021-06-01 NOTE — PROGRESS NOTES
Mental Health Visit Note    9/25/2019    Start time: 901  Stop Time: 958   Session # 11    53+ minutes due to patient's continues severe depression.     Patient will be seen twice this week due to his severe depression and waiting to get into a DBT program.     Session Type: Patient is presenting for an Individual session.    Cong Tuttle is a 28 y.o. male is being seen today for    Chief Complaint   Patient presents with     MH Follow Up     Low self-esteem     Present For Session: Patient and therapist     New symptoms or complaints: None    Functional Impairment:   Personal: 3  Family: 3  Work: 2  Social:3    Clinical assessment of mental status: Mr. Tuttle presented on time for scheduled session today.  He was open and cooperative, and dressed appropriately for this session and weather. His memory was good for both short and long term.  His speech and language was soft and concise throughout the session.  Concentration and focus is within normal limits. Psychosis is not noted or reported. He reports his mood is down.  Affect is congruent with speech.  Fund of knowledge is adequate. Insight is adequate for therapy. Reviewed and changes made as needed.     Suicidal/Homicidal Ideation present: Patient denied current suicidal ideations, plans and/or means at this time. Patient continues to agree to follow safety plan by calling 911 and/or reporting to ER if suicidal ideations were to occur again.    Patient's impression of their current status: Patient reported feeling depressed. Patient indicated he continues to struggle with his self-esteem. Patient reported he continues to see only flaw's within himself. Patient indicated agreeing with the comments that people gave him about positive things. Patient reported being surprised by people's responses especially people he feels he did not know well. Patient indicated no suicidal ideations, plans and/or means since seeing this therapist last. Patient reported he  has been working on confronting his depression by getting out more.     Therapist impression of patients current state: Patient appears to have fair insight into his mental health. This therapist processed with patient ways he continues to accept positive feedback. This therapist processed with patient taking the positive words people said about him and writing examples of when he has proved these words to be true.     Type of psychotherapeutic technique provided: Insight oriented, Client centered and CBT    Progress toward short term goals: Progress as expected with patient returning to scheduled session, using skills taught is session,  and maintaining sobriety.     Review of long term goals: Treatment Plan Updated on 07/26/2019    Diagnosis:   1. Major depressive disorder, recurrent episode, moderate (H)    2. JOSSIE (generalized anxiety disorder)    3. Moderate alcohol use disorder (H)      Plan and Follow up: Patient appears to have fair insight into his mental health. Patient would benefit from identifying what he wants in a friendship. Patient should work on using skills taught in session to work on reducing self-harm. Patient would benefit from using skills taught in session to help with the transition of group at this time.Patient would benefit from writing 2 things he likes about himself and what he thinks other people like about him.  Patient should call 911 and/or report to ER if suicidal ideations were to occur.     Discharge Criteria/Planning: Patient will continue with follow-up until therapy can be discontinued without return of signs and symptoms.    Maria Del Rosario Backer 9/25/2019

## 2021-06-01 NOTE — PROGRESS NOTES
Weekly Progress Note/Transfer to Phase III  Cong Tuttle  1990  [id number]      D) Pt attended 3/2 groups this week with no absences, patient is in phase II. Patient attended 2 individual sessions this week on 9/9/19 and 9/13/19  A) Staff facilitated groups and reviewed tx progress. Assessed for [facility]. R) No VAP needed at this time.     Any significant events, defines as events that impact patients relationship with others inside and outside of treatment: Yes: patient was released from hospital on 6/10/19. Patient reported that another patient had requested to move into his apartment (see 1:1 appointment on 7/3/19). Patient has communicated to the other patient that she is not allowed to move in, this patient to communicate to staff if there are any more requests from the other patient. Patient reported on 8/26/19 that he had suicide attempt on 8/25/19 via strangulation (see documentation).   Indicate any changes or monitoring of physical or mental health problems: Yes: patient reports ongoing self harm, last reported on 9/13/19. Patient reported reduced SI, reported continued commitment to following safety plan.   Indicate involvement by any outside supports: Yes: psychiatrist in [place], therapist at [facility]. Counselor in communication with patient's therapist regarding safety concerns  IAPP reviewed and modified as needed: NA    Pt working on the following dimensions:  Dimension #1 - Withdrawal Potential - Risk 0. Patient reports last use of alcohol on 6/5/19 at intake, most recent pattern of use has been 3-9 drinks 2-4x per week. Patient was not requested to complete a UA on 6/19/19 which was positive for benzodiazepines, patient is currently prescribed klonopin, did not indicate recent alcohol use. Patient reported having two glasses of wine on 6/21/19. Patient reported drinking a beer on 7/8/19. Patient was requested to complete a UA on 7/5/19, results indicated that it was diluted.  Patient was requested to complete another UA on 7/8/19, which was negative for all tested substances, including patient's prescribed benzodiazepines. These results were reviewed with patient, patient presented current medication, reported he has been taking it, and also reports that he has been drinking large amounts of fluids, with restricted eating during the day, which may be effective results. Patient was requested to complete a UA on 8/5/19 which was positive for benzodiazepines, which the patient is currently prescribed. Patient does not report or display any withdrawal symptoms at this time.   Specific goals from treatment plan addressed this week:  Patient reported no substance use this week. Patient was not requested to complete a UA this week.   Effectiveness of strategies:  Strategies appear effective.   Dimension #2 - Biomedical - Risk 0. Patient reports no current health concerns, but does report restricting his eating. Patient has medical insurance and is able to access medical care as needed.   Specific goals from treatment plan addressed this week:  Patient reported no changes to physical health this week. Patient reported a drop in his appetite, counselor encourage patient to attempt to eat some even when he doesn't feel like it.   Effectiveness of strategies:  Strategies appear effective.   Dimension #3 - Emotional/Behavioral/Cognitive - Risk 2. Patient reports mental health diagnoses of major depressive disorder, generalized anxiety disorder, and borderline personality disorder. Patient reports that he does currently have psychiatry services that he regularly attends (weekly currently).  Patient endorses current mental health medications. Patient currently sees a therapist. Patient was recently discharged from a stay on a mental health unit due to suicidal ideation. Patient has a long history of struggling with suicidal ideation and attempts. Patient endorses active struggles with food  restriction, as well as self-harm. Patient states that he greatly struggles with impulse control at this time, and concern of engaging more in self harm when not drinking. Patient is oriented x4.   Active interventions to stabilize mental health symptoms:  Patient reports taking medications as prescribed, attending weekly therapy. Counselor checking in with patient at each appointment regarding SIB and SI, patient continues to be in agreement to follow safety plan. Patient reported continued inconsistency with medications, attempting to get back on schedule.   Specific goals from treatment plan addressed this week:  Patient reported intake for DBT scheduled on 9/17/19. Patient reported speaking to his psychiatrist and reported starting a new medication this week. Patient identified that he is experiencing an increase in depressive symptoms, attempting to try to still do thing while experiencing significant fatigue. Patient identified structure as a helpful coping skill.   Effectiveness of strategies:  Strategies appear effective, staff to continue to monitor.   Dimension #4 - Readiness for Change - Risk 0. Patient verbalizes that he feels that he is here on his own regard, despite being recommended by his psychiatrist. Patient does verbalize that he believes his use is problematic, and that he wants help to change at this time. Patient verbalizes willingness to follow all recommendations made for him while in treatment. Patient appears genuinely motivated for treatment and change at this time.  Specific goals from treatment plan addressed this week:  Patient attended 3/2 groups this week and 2 individual appointments. Patient will be starting phase III next week, but will be meeting with counselor 2x next week in lieu of group on 9/17/19 due to conflict with DBT intake appointment.   Effectiveness of strategies:  Strategies appear effective.   Dimension #5 - Relapse Potential - Risk 2. Patient reports regular use of  alcohol up until 6/5/19, and so he therefore may currently lack the necessary coping skills to help him prevent relapse at this time. He indicated that he struggles with impulse control which could indicate an increased risk of relapse at this time. Patient does report some past treatment experience and a history of periods of sustained sobriety. He therefore may have some knowledge of the skills needed to maintain sobriety, however it does not appear that he has been utilizing those skills in the past 6 months. Patient's lack of current skills, mental health, and impulse control issues appear to indicate an increased risk of relapse at this time. Patient has had 2 relapses in outpatient treatment.   Specific goals from treatment plan addressed this week:  Patient reported some urges for alcohol and cannabis use this week, reported sleeping as a coping skill. Patient reports current abstinence since 7/8/19.   Effectiveness of strategies:  Strategies appear effective.   Dimension #6 - Recovery Environment - Risk 2. Patient currently works full-time, and do he does appear to have some structure for his daily life. Patient struggled to identify sober hobbies, so he does appear to lack meaningful activity outside of work at this time. Patient states that he currently has a stable place to live, however it is not always the most supportive as he sometimes isolates. Patient indicated peer support for sobriety, and that he has been attending support groups, so he does have some support for his sobriety. He states that his family does not know about his use currently, and that they would not be supportive of him if they were made aware. Patient denies any current legal involvement.   Specific goals from treatment plan addressed this week:  Patient reported plan to attempt to have some structure this week. Patient identified not wanting to sleep all weekend, discussed options with counselor.   Effectiveness of  strategies:  Strategies appear effective.   T) Treatment plan updated: to be updated next week.  Patient notified and in agreement: N/A   Patient educated on Feelings/Emotions. Patient has completed 134 of 90 program hours at this time, patient has completed phase II as of 9/13/19, will be starting phase III on 9/16/19. Discharge plan is resources in the community/mental health services.     Elinor Pandya [facility], [facility]  9/13/2019, 2:50 PM       Weekly Educational Topics Date   1. Dual Diagnoses, week 1 8/26, 8/28, 8/30   2. Dual Diagnoses, week 2 9/4, 9/6   3. Stress Management 8/12, 8/16 8/19, 8/23   4. Feelings/Emotions 9/9, 9/11, 9/13   5. Thinking 6/14   6. Change 6/17, 6/19, 6/21   7. Recovery Support 6/24, 6/26, 6/28   8. Relationships/Communication 7/1, 7/3, 7/5   9. Addiction 101 7/8, 7/10, 7/12   10. Relapse Prevention 7/15, 7/17, 7/19   11. Grief and Loss 7/22, 7/24, 7/26   12. Strengths 7/29, 7/31, 8/2   13. Wellness 8/5, 8/7, 8/9   Mindfulness  6/14, 6/21, 6/28, 7/5, 7/12, 7/19, 7/26, 8/2, 8/9, 8/16, 8/30, 9/6, 9/13

## 2021-06-01 NOTE — PROGRESS NOTES
"D. Counselor met with patient for 70 minutes to discuss treatment progress. A. Counselor inquired about patient's DBT intake, and encouraged patient for taking the steps to start this program. Counselor sought clarification on patient's interest in scarification, encouraged patient to be aware of his intentions. Counselor reviewed some of the risks with cannabis use and reminded patient the the Co-occurring outpatient program is an abstinence-based program. Counselor asked patient about current SI, and ability and willingness to follow safety plan. Counselor encouraged patient reach to other supports during the week that this counselor is gone, counselor also provided other co-occurring counselor's phone number in case the patient needs to check in during the week. Counselor also gave patient a book related to living a good story.  R. Patient reported that he is feeling better about starting the DBT program this next week, with individual therapy on 9/23/19 and group starting on 9/26/19. Patient reported that he is interested in scarification for the \"look\" of it, but also identified that he is probably not in a good place with self-harm in order to do it now. Patient reported that he has some interest in using marijuana, but also identified that he has used marijuana while also using alcohol or other substances in the past, and that he doesn't want to use other substance or alcohol again. Patient reported low SI, no intent and reported willingness to follow safety plan. Patient in agreement to reach out to other supports, and that he will be meeting with his therapist 2x next week. PAtient reported willingness to try the book. T. Patient to continue with phase III, next attending group on 9/24/19.     Patient treatment was reviewed. Changes were made. Patient was actively and directly involved in the treatment plan review and updates.     EVAN Blunt, VALENCIA  9/19/2019, 4:37 PM    "

## 2021-06-01 NOTE — PROGRESS NOTES
WHITNEY. Counselor met with patient for 100 minutes to discuss treatment progress. A. Counselor inquired about patient's experience with the DBT therapist, discussed potential reasons for the therapist's referral to the [facility]. Counselor processed with patient concerns related to disordered eating behavior increasing as self-harm reduces, and possible benefits to following referral. Counselor assessed for SI, encouraged patient to consider how things can potentially change in a positive way in his life. R. Patient reported meeting with his DBT therapist yesterday and that this therapist has recommended that he complete an assessment with the [facility] related to his eating. Patient identified that this recommendation feels overwhelmed to him, and particularly felt threatened by the suggestion that he discontinue outpatient co-occurring treatment in order to make time his schedule. Patient appeared receptive to counselor's feedback regarding his symptoms, and acknowledged that he is unable to eat unless he exercises and more recently until after self-harming. Patient reported continued abivelence about life and life's purpose, reported willingness to follow safety plan and no intent, plan or means at this time. T. Patient to continue with phase III, next 1:1 scheduled on 10/8/19.     Patient treatment was reviewed. Changes were not made. Patient was actively and directly involved in the treatment plan review and updates.     EVAN Blunt, [facility]  10/1/2019, 4:49 PM

## 2021-06-01 NOTE — PROGRESS NOTES
Cong Tuttle attended 3 hours of group today.     The group topic was Feelings/Emotions, patient was responsive to topic.     Patients engagement in the group session: high     Total group size: 7      VALENCIA Blunt  9/9/2019, 1:59 PM

## 2021-06-01 NOTE — PROGRESS NOTES
Cong Tuttle attended 3 hours of group today. Patient reported minimal SI, no intent and willing to follow safety plan.     The group topic was Dual Diagnosis II, patient was responsive to topic.     Patients engagement in the group session: high     Total group size: 7      VALENCIA Blunt  9/4/2019, 3:03 PM

## 2021-06-01 NOTE — PROGRESS NOTES
Cong Tuttle attended 2 hours of group today.     The group topic was Acceptance, patient was responsive to topic.     Patients engagement in the group session: medium     Total group size: 4      VALENCIA Blunt  10/1/2019, 11:32 AM

## 2021-06-01 NOTE — PROGRESS NOTES
Mental Health Visit Note    9/20/2019    Start time: 1202  Stop Time: 1249   Session # 10    Session Type: Patient is presenting for an Individual session.    Cong Tuttle is a 28 y.o. male is being seen today for    Chief Complaint   Patient presents with      Follow Up     Self-esteem     Present For Session: Patient and therapist     New symptoms or complaints: None    Functional Impairment:   Personal: 3  Family: 3  Work: 2  Social:3    Clinical assessment of mental status: Mr. Tuttle presented on time for scheduled session today.  He was open and cooperative, and dressed appropriately for this session and weather. His memory was good for both short and long term.  His speech and language was soft and concise throughout the session.  Concentration and focus is within normal limits. Psychosis is not noted or reported. He reports his mood is down.  Affect is congruent with speech.  Fund of knowledge is adequate. Insight is adequate for therapy. Reviewed and changes made as needed.     Suicidal/Homicidal Ideation present: Patient denied current suicidal ideations, plans and/or means at this time. Patient continues to agree to follow safety plan by calling 911 and/or reporting to ER if suicidal ideations were to occur again.    Patient's impression of their current status: Patient indicated feeling down. Patient reported he continues to look at the negative features he has about himself. Patient indicated he believes that plastic surgery would make him feel better. Patient reported when he looks at other people he does not see the same flaws that he see's within himself. Patient indicated he knows about radical acceptance but does not find it to work for him. Patient reported it feels that it is like trying to trick his mind. Patient indicated he sent a message out and received 125 responses on what people like about him.     Therapist impression of patients current state: Patient appears to have fair insight  into his mental health. This therapist processed with patient radical acceptance and information related to self-esteem. This therapist challenged patient on the responses he received and his response. This therapist processed with patient looking at the top 10 people he communicates with and what their responses were to his question.     Type of psychotherapeutic technique provided: Insight oriented, Client centered and CBT    Progress toward short term goals: Progress as expected with patient returning to scheduled session, using skills taught is session,  and maintaining sobriety.     Review of long term goals: Treatment Plan Updated on 07/26/2019    Diagnosis:   1. Major depressive disorder, recurrent episode, moderate (H)    2. JOSSIE (generalized anxiety disorder)    3. Moderate alcohol use disorder (H)      Plan and Follow up: Patient appears to have fair insight into his mental health. Patient would benefit from identifying what he wants in a friendship. Patient should work on using skills taught in session to work on reducing self-harm. Patient would benefit from using skills taught in session to help with the transition of group at this time.Patient would benefit from writing 2 things he likes about himself and what he thinks other people like about him.  Patient should call 911 and/or report to ER if suicidal ideations were to occur.     Discharge Criteria/Planning: Patient will continue with follow-up until therapy can be discontinued without return of signs and symptoms.    Maria Del Rosario Anand 9/20/2019

## 2021-06-01 NOTE — PROGRESS NOTES
WHITNEY. Counselor met with patient for 70 minutes to discuss treatment progress. A. Counselor discussed plan for patient to start phase III next week as well as DBT intake. Counselor validated patient's emotions of feeling a sense of loss, but encouraged patient to also consider positives of the changes this next week. Counselor inquired about patient's eating and sleeping patterns, encouraged patient to attempt to eat even when not feeling hungry, and attempt to sleep on the futon instead of the floor. Counselor encouraged patient to attempt to treat himself like he would treat one of his friends. Counselor assessed for SI, and patient's willingness and ability to follow safety plan. R. Patient reported that he is not looking forward to next week, appeared somewhat receptive to counselor's feedback. Patient reported that he hasn't slept in his bed for around 2 months, and has some anxiety about being comfortable. Patient reported that he has had little appetite lately, has been eating in his sleep. Patient reported willingness to attempt to eat some tonight and sleep on futon instead the floor. Patient reported less SI and no intent, communicated that he has taken his knives and belt to his parents (which was confirmed by patient's mother via voicemail), and reported willingness to follow safety plan. T. Patient to complete phase II on 9/13/19, and start phase III next week. Patient to attend DBT intake on 9/17/19.     Patient treatment was reviewed. Changes were made. Patient was actively and directly involved in the treatment plan review and updates.     EVAN Blunt, [facility]  9/11/2019, 3:42 PM

## 2021-06-01 NOTE — PROGRESS NOTES
Cong Tuttle attended 3 hours of group today.     The group topic was Feelings/Emotions, patient was responsive to topic.     Patients engagement in the group session: high     Total group size: 9      VALENCIA Blunt  9/11/2019, 1:49 PM

## 2021-06-01 NOTE — PROGRESS NOTES
Mental Health Visit Note    9/13/2019    Start time: 331  Stop Time: 430   Session # 9    53+ due to patient's level of depression    Session Type: Patient is presenting for an Individual session.    Cong Tuttle is a 28 y.o. male is being seen today for    Chief Complaint   Patient presents with     MH Follow Up     Depression     Present For Session: Patient and therapist     New symptoms or complaints: None    Functional Impairment:   Personal: 3  Family: 3  Work: 2  Social:3    Clinical assessment of mental status: Mr. Tuttle presented on time for scheduled session today.  He was open and cooperative, and dressed appropriately for this session and weather. His memory was good for both short and long term.  His speech and language was soft and concise throughout the session.  Concentration and focus is within normal limits. Psychosis is not noted or reported. He reports his mood is depressed.  Affect is congruent with speech.  Fund of knowledge is adequate. Insight is adequate for therapy. Changes made as needed.     Suicidal/Homicidal Ideation present: Patient denied current suicidal ideations, plans and/or means at this time. Patient continues to agree to follow safety plan by calling 911 and/or reporting to ER if suicidal ideations were to occur again.    Patient's impression of their current status: Patient reported feeling depressed. Patient indicated he feels his depressed has stayed the same since seeing this therapist last. Patient reported his self-harm continues and may be getting worse. Patient indicated he even has started self-harming on breaks during group. [facility] [place] [facility] [place] [facility] and this being extremely challenging for him. Patient indicated feeling a significant sense of loss. Patient reported he is schedule on Tuesday for the DBT consultation.       Therapist impression of patients current state: Patient appears to have fair insight into his mental health. This  therapist processed with patient rational thinking in terms of change. This therapist processed with patient his continued self-harm and using the coping skills to work on reducing these behaviors. This therapist processed with patient the importance of setting small goals for himself and the importance of spending time with his support network.     Type of psychotherapeutic technique provided: Insight oriented, Client centered and CBT    Progress toward short term goals: Progress as expected with patient returning to scheduled session, using skills taught is session,  and maintaining sobriety.     Review of long term goals: Treatment Plan Updated on 07/26/2019    Diagnosis:   1. Major depressive disorder, recurrent episode, moderate (H)    2. JOSSIE (generalized anxiety disorder)    3. Moderate alcohol use disorder (H)      Plan and Follow up: Patient appears to have fair insight into his mental health. Patient would benefit from identifying what he wants in a friendship. Patient should work on using skills taught in session to work on reducing self-harm. Patient would benefit from using skills taught in session to help with the transition of group at this time.Patient would benefit from writing 2 things he likes about himself and what he thinks other people like about him.  Patient should call 911 and/or report to ER if suicidal ideations were to occur.     Discharge Criteria/Planning: Patient will continue with follow-up until therapy can be discontinued without return of signs and symptoms.    Maria Del Rosario Anand 9/17/2019

## 2021-06-01 NOTE — TELEPHONE ENCOUNTER
Counselor returned patient's phone call. Counselor assessed for patient's safety and willingness to follow safety plan. Patient reported that he is feeling safe, no intent and willing to follow safety plan. Patient reported plan to take knives and belt to his parents house later on this date, and that he would have his mother call counselor to confirm. Patient reported plan to call [facility] regarding DBT referral. Patient in agreement attend on 9/11/19 and meet with counselor on that same date.     EVAN Blunt, [facility]  9/10/2019, 8:39 AM

## 2021-06-01 NOTE — PROGRESS NOTES
Cong Tuttle attended 3 hours of group today. Patient reported no suicidal intent and willingness and ability to follow safety plan. Counselor and patient discussed ideas for the patient have structure over the weekend, including possibly visiting his parents or going to a board game meetup.     The group topic was Feelings/Emotions, patient was responsive to topic.     Patients engagement in the group session: high     Total group size: 8      VALENCIA Blunt  9/13/2019, 1:59 PM

## 2021-06-01 NOTE — PROGRESS NOTES
WHITNEY. Counselor met with patient for 65 minutes to discuss treatment progress. MARCELL. Counselor discussed patient's note regarding body image, and encouraged patient to discuss his concerns with his therapist. Counselor discussed the difference in roles between counselor and therapist, and wanting to be clear about the distinctions. Counselor encouraged patient about his progress related to being abstinent from alcohol for almost sixty days, and that is not uncommon for people to see increases in other compulsive behaviors, including self-harm and disordered eating. Counselor encouraged patient to fill out intake form for [facility]'s DBT program, and discussed the importance of the patient getting additional mental health services at this time. Counselor discussed attempt to communicate with patient's psychiatrist, and also discussed potential referral for psychiatry in [facility]. Counselor assessed for SI, and asked about patient's willingness to follow the safety plan. Counselor affirmed patient's experience of relating to things in music and movies, but also cautioned patient to aware of how these thing can keep the patient stuck in negative thoughts. R. Patient reported that the discussion eating disorders triggered his thoughts about negative body image, and agreed to discuss this with therapist on 9/6/19. Patient reported understanding the different roles of counselor and therapist. Patient reported feeling proud of being sober from alcohol for sixty days, reported that he had recently used laxatives, partly due to a slight increase in weight and also due to constipation. Patient reported goal to complete intake form for DBT tonight, reported feeling some nervousness but overall willingness to go to DBT. Patient reported that he would consider transferring psychiatry to [facility]. Patient reported reduced suicidal ideation, no intent and willingness to follow safety plan. Patient read lyrics  of a song that he resonated with, appeared receptive to counselor's feedback about being aware of how music and movies may be reinforcing his suicidal ideation. T. Patient to continue with phase II.     Patient treatment was reviewed. Changes were not made. Patient was actively and directly involved in the treatment plan review and updates.     EVAN Blunt, [facility]  9/5/2019, 3:42 PM

## 2021-06-01 NOTE — PROGRESS NOTES
Individual Treatment Plan    Patient  Name: Cong Tuttle  MRN: [id number]   : 1990  Admit Date: 19  Date of Initial Service Plan: 19  Tentative Discharge Date: 19  Diagnosis: Alcohol Use Disorder, Moderate (F10.20)    Counselor: Elinor Pandya, EVAN, VALENCIA      Dimension 1: Acute Intoxication/Withdrawal Potential, Risk level: 0    Problem Statement from Comprehensive Assessment:  Patient reports weekly alcohol use, with his last use being on 19. Patient did recently get discharged from a mental health unit, and so was monitored for potential withdrawal while there. Patient denies any current withdrawal symptoms or concerns. Patient shows no physical signs or symptoms of intoxication or withdrawal. Patient may be at some risk of withdrawal if he discontinues his anti-anxiety medication. Patient does not appear to be at risk of severe withdrawal at this time. Patient is oriented x4.     Problem: Patient's last use of alcohol on 19.  Goal: Patient to maintain abstinence throughout outpatient treatment.   Must be reached to complete treatment? Yes  Methods/Strategies (must include amount and frequency):   1. Patient to report any substance/alcohol use to counselor to determine if any changes need to be made to address withdrawal symptoms.   2. Patient to complete any requested UAs.   Target Date: 19  Completion Date:        Dimension 2: Biomedical Conditions/Complications, Risk level: 0    Problem Statement from Comprehensive Assessment:  Patient denies any current health issues or concerns. He does not currently have a PCP, but does have insurance and so appears able to get his medical needs met and addressed as necessary. Patient denies any immediate medical needs or concerns. He appears to be in adequate physical health and functioning at this time.     Problem: Patient reports good physical health.   Goal: Patient to maintain stable health throughout outpatient treatment.    Must be reached to complete treatment? No  Methods/Strategies (must include amount and frequency):   1. Patient to report any changes to physical health to counselor.   2. Patient to attend all scheduled doctor s appointments.   3. Patient to take medications as prescribed.  4. Patient to establish care with a primary care clinic.    Target Date: 11/29/19  Completion Date:     Problem: Patient reports current tobacco use.   Goal: Patient to receive information about smoking cessation.   Must be reached to complete treatment? No  Methods/Strategies (must include amount and frequency):   1. Staff to provide patient with nicotine cessation information and help on how to quit use.   2. Patient to report any progress on stopping nicotine use to staff.   Target Date: 11/29/19  Completion Date:       Dimension 3: Emotional/Behavioral/Cognitive, Risk level: 2    Problem Statement from Comprehensive Assessment:  Patient reports mental health diagnoses of major depressive disorder, generalized anxiety disorder, and borderline personality disorder. Patient reports that he does currently have psychiatry services that he regularly attends (weekly currently). Patient denies any other current mental health services, but does verbalize a desire to obtain therapy services. Patient endorses current mental health medications. Patient was recently discharged from a stay on a mental health unit due to suicidal ideation. Patient has a long history of struggling with suicidal ideation and attempts. Patient endorses active struggles with food restriction, as well as self-harm. Patient states that he greatly struggles with impulse control at this time, and concern of engaging more in self harm when not drinking. Patient denies any current suicidal or homicidal thoughts or plans. Patient is oriented x4.     Problem: Patient reports mental health diagnoses of depression, anxiety and borderline personality disorder.   Goal: Patient to manage  mental health.   Must be reached to complete treatment? No  Methods/Strategies (must include amount and frequency):   1. Patient to meet with counselor or other  staff to complete a diagnostic assessment for mental health. Patient to discuss with primary counselor potential referrals.  Target Date: 9/20/19  Completion Date: 6/13/19  2. Patient to begin using coping skills learned in therapeutic group (such as grounding, breathing, thought challenging, mindfulness, etc), and share in daily check-in any benefits or challenges that you experience using these skills.  3. Patient to attend all psychiatry appointments.   4. Patient to take all medications as prescribed.   5. Patient to attend all therapy appointments, contact mental health clinic if unable to attend ([phone])   6. Patient to attend DBT programming, share with group/counselor any benefits or challenges you experience.   Target Date: 11/29/19  Completion Date:     Problem: Patient struggles with suicidal ideation and self harm.   Goal: Patient to manage urges to self harm.   Must be reached to complete treatment? No  Methods/Strategies (must include amount and frequency):   1. Patient to develop a safety plan if necessary with primary counselor, and report to counselor immediately of any self harm or suicidal thoughts.   2. Patient to work with counselor on urges to restrict food, patient to identify small goals to increase daily calorie consumption, share benefits and challenges you experience.   3. Patient to attempt to utilize coping skills before engaging in self-harm, and report all self-harm to counselor.   3. Patient to seek medical attention if necessary.   Target Date: 11/29/19  Completion Date:       Dimension 4: Readiness to Change, Risk level 0    Problem Statement from Comprehensive Assessment:  Patient verbalizes that he feels that he is here on his own regard, despite being recommended by his psychiatrist. Patient does verbalize that  he believes his use is problematic, and that he wants help to change at this time. Patient verbalizes willingness to follow all recommendations made for him while in treatment. Patient appears genuinely motivated for treatment and change at this time. Patient was calm, cooperative, and appropriately behaved throughout this appointment.     Problem: Patient reports motivation for treatment and sobriety.   Goal: Patient to increase motivation towards recovery by participating in outpatient programming.   Must be reached to complete treatment? Yes  Methods/Strategies (must include amount and frequency):   1. Patient to attend 3, 3-hour groups per week and all scheduled 1:1s.  Target Date: 9/20/19  Completion Date: 8/9/19  2. Patient to attend 2, 3-hour groups per week and all scheduled 1:1s.   Target Date: 9/20/19  Completion Date: 9/6/19  3. Patient to attend 1, 2-hour group per week and all scheduled 1:1s  3. Patient will contact staff if unable to attend ([facility] [phone]).  4. Patient will identify weekly and daily goals to help increase motivation and engagement in recovery.  Target Date: 9/20/19  Completion Date: 9/6/19      Dimension 5: Relapse/Continued Use/Continued Problem Potential, Risk level: 2    Problem Statement from Comprehensive Assessment:  Patient reports regular use of alcohol up until 6/5/19, and so he therefore may currently lack the necessary coping skills to help him prevent relapse at this time. He indicated that he struggles with impulse control which could indicate an increased risk of relapse at this time. Patient does report some past treatment experience and a history of periods of sustained sobriety. He therefore may have some knowledge of the skills needed to maintain sobriety, however it does not appear that he has been utilizing those skills in the past 6 months. Patient's lack of current skills, mental health, and impulse control issues appear to indicate an increased risk of  relapse at this time.     Problem: Patient lacks coping skills to prevent relapse.   Goal: Patient to gain skills to prevent relapse.   Must be reached to complete treatment? Yes  Methods/Strategies (must include amount and frequency):   1. Patient to share in daily check-in any urges and addictive thinking to better understand his pattern of use and to prevent relapse in the future.  2. Patient to develop relapse prevention plan and share with counselor.   Target Date: 11/29/19  Completion Date:       Dimension 6: Recovery Environment, Risk level: 2    Problem Statement from Comprehensive Assessment:  Patient currently works full-time, and do he does appear to have some structure for his daily life. Patient struggled to identify sober hobbies, so he does appear to lack meaningful activity outside of work at this time. Patient states that he currently has a stable place to live, however it is not always the most supportive as he sometimes isolates. Patient indicated peer support for sobriety, and that he has been attending support groups, so he does have some support for his sobriety. He states that his family does not know about his use currently, and that they would not be supportive of him if they were made aware. Patient denies any current legal involvement.     Problem: Lack of sober support   Goal: Patient to expand sober support.  Must be reached to complete treatment? yes  Methods/Strategies (must include amount and frequency):   1. Explore and identify at least 2 sober support meetings to regularly attend.  2. Patient to identify at least 1 person from a meeting that he would like to get to know better. Patient to discuss with counselor ways that he can work to build friendship with this person.  3. Patient to identify who is currently in his support network, and identify ways each person can be supportive to his recovery. Share with counselor.   Target Date: 11/29/19  Completion Date:     Problem: Patient  lacks sober activity.  Goal: Patient to develop sober hobbies.   Must be reached to complete treatment? Yes  Methods/Strategies (must include amount and frequency):   1. Identify activities that peak interest. Maintain consistent attendance.  2. Patient to identify weekly and daily activities he does to maintain his recovery. Share with counselor.   3. Patient to identify new places in the [facility] that he has interest in visiting. Patient to share with counselor/group any challenges or benefits you experience.    Target Date: 11/29/19  Completion Date:       Resources  Resources to which the patient is being referred for problems when problems are to be addressed concurrently by another provider: Psychiatry, patient has been referred to [facility] for individual therapy and [facility] for DBT.         By signing this document, I am acknowledging that I was actively and directly involved in the development of my treatment plan.         Patient Signature:_____________________________            Date:_____________     Staff Signature:   EVAN Blunt, [facility] [place]                   Date: 9/19/2019, 4:07 PM

## 2021-06-01 NOTE — PROGRESS NOTES
"INDIVIDUAL SESSION NOTE    D) Patient met 1:1 with counselor for an individual session lasting 45 minutes.     A) Writer and patient continued discussion from group about patient finding suicide appealing and finding rationalization for someone to end their life.     R) Patient reported that he was open to feedback from others and was trying to challenge his thinking that suicide would end pain and suffering. Patient also stated that he could see that his mother would be hurt if he ended his life, however, did not feel that hurt would last long and she would \"get over it\". Writer worked with patient to challenge this thinking. Patient denied SI this day and identified that he was open to feedback to challenge this idea of suicide being appealing.     T) Patient was encouraged to continue to fact check his thinking and ask for feedback from others in order to challenge his thought patterns.       Patient treatment was reviewed. Changes were/ were not made. Patient was actively and directly involved in the treatment plan review and updates.     Ramona Queen MA, LMFT, [facility]  9/27/2019, 2:24 PM      "

## 2021-06-01 NOTE — TELEPHONE ENCOUNTER
Counselor returned patient's phone call. Counselor encourage patient to be compassionate to himself in his depression, particularly in not trying to push himself too much in doing things when he isn't feeling well. Counselor assessed for SI, discussed that the patient's current feelings have the possibility of changing, also asked patient if he is willing and able to follow safety plan. Patient reported frustration that he is feeling very tired and does not want to be at work. Patient reported plan to contact friends this evening to see if any would be able to come hang out with him at his apartment. Patient reported no suicidal intent, reported willingness and ability to follow safety plan.     Counselor also received phone call from patient's mom. Patient's mother expressed concerns about patient's worsening symptom and the amount of marks from SIB. Counselor discussed need for more mental ángel service at this time and patient's referral for DBT. Counselor encouraged patient's mother to keep checking in with the patient for safety.     EVAN Blunt, [facility]  9/12/2019, 11:14 AM

## 2021-06-01 NOTE — PROGRESS NOTES
"D. Counselor met with patient for 70 minutes to discuss treatment progress. A. Counselor inquired about the patient's weekend, encouraged patient for reaching out to other's, and commented that the patient's depression seemed to slightly improve. Counselor encouraged patient to challenge his thinking, looking for alternative interpretations of situations. Counselor also encouraged patient to attend DBT intake appointment scheduled for 9/17/19 and discussed how the resource of the 24/7 phone coaching would be an important resource the patient. Counselor discussed that this counselor will be gone next week, and had coordinated with the patient's therapist to be seen an additional day for additional support. Counselor encouraged patient to consider how this can be opportunity to utilize other supports as resources.  Counselor assessed for SI and ability/willingness to follow safety plan. R. Patient reported that he had been sleeping less over the weekend and spent time with two friends over the weekend. Patient discussed an interaction with a friend that he was frustrated by, was receptive about counselor's feedback about flexible thinking. Patient reported that he called to confirm the DBT intake appointment and will be attending it. Patient reported some concern about this counselor's absence, identified that he has become dependent on this counselor. Patient also identified that he felt \"pushed away,\" but logically knew that that wasn't accurate, and agreed to reach out other supports during that week. Patient reported minimal suicidal ideation and urges, reports having no means, and that he is willing and able to follow safety plan. T. Patient to meet with counselor on 9/19/19, and start phase III group on 9/24/19.     Patient treatment was reviewed. Changes were made. Patient was actively and directly involved in the treatment plan review and updates.     EVAN Blunt, [facility]  9/16/2019, 4:52 PM  "

## 2021-06-01 NOTE — PROGRESS NOTES
Weekly Progress Note  Cong Tuttle  1990  [id number]      D) Pt attended 2/2 groups this week with no absences, patient is in phase II. Patient attended 2 individual sessions this week on 9/3/15 and 9/5/19.   A) Staff facilitated groups and reviewed tx progress. Assessed for [facility]. R) No VAP needed at this time.     Any significant events, defines as events that impact patients relationship with others inside and outside of treatment: Yes: patient was released from hospital on 6/10/19. Patient reported that another patient had requested to move into his apartment (see 1:1 appointment on 7/3/19). Patient has communicated to the other patient that she is not allowed to move in, this patient to communicate to staff if there are any more requests from the other patient. Patient reported on 8/26/19 that he had suicide attempt on 8/25/19 via strangulation (see documentation).   Indicate any changes or monitoring of physical or mental health problems: Yes: patient reports ongoing self harm, last reported on 9/6/19. Patient reported reduced SI, reported continued commitment to following safety plan.   Indicate involvement by any outside supports: Yes: patient has a sponsor, psychiatrist in [place], therapist at [facility]. Counselor in communication with patient's therapist regarding safety concerns, attempted to contact patient's psychiatrist.   IAPP reviewed and modified as needed: NA    Pt working on the following dimensions:  Dimension #1 - Withdrawal Potential - Risk 0. Patient reports last use of alcohol on 6/5/19 at intake, most recent pattern of use has been 3-9 drinks 2-4x per week. Patient was not requested to complete a UA on 6/19/19 which was positive for benzodiazepines, patient is currently prescribed klonopin, did not indicate recent alcohol use. Patient reported having two glasses of wine on 6/21/19. Patient reported drinking a beer on 7/8/19. Patient was requested to complete a UA on  7/5/19, results indicated that it was diluted. Patient was requested to complete another UA on 7/8/19, which was negative for all tested substances, including patient's prescribed benzodiazepines. These results were reviewed with patient, patient presented current medication, reported he has been taking it, and also reports that he has been drinking large amounts of fluids, with restricted eating during the day, which may be effective results. Patient was requested to complete a UA on 8/5/19 which was positive for benzodiazepines, which the patient is currently prescribed. Patient does not report or display any withdrawal symptoms at this time.   Specific goals from treatment plan addressed this week:  Patient reported no substance use this week. Patient was not requested to complete a UA this week.   Effectiveness of strategies:  Strategies appear effective.   Dimension #2 - Biomedical - Risk 0. Patient reports no current health concerns, but does report restricting his eating. Patient has medical insurance and is able to access medical care as needed.   Specific goals from treatment plan addressed this week:  Patient reported no changes to physical health this week.   Effectiveness of strategies:  Strategies appear effective.   Dimension #3 - Emotional/Behavioral/Cognitive - Risk 2. Patient reports mental health diagnoses of major depressive disorder, generalized anxiety disorder, and borderline personality disorder. Patient reports that he does currently have psychiatry services that he regularly attends (weekly currently). Patient denies any other current mental health services, but does verbalize a desire to obtain therapy services. Patient endorses current mental health medications. Patient was recently discharged from a stay on a mental health unit due to suicidal ideation. Patient has a long history of struggling with suicidal ideation and attempts. Patient endorses active struggles with food restriction, as  well as self-harm. Patient states that he greatly struggles with impulse control at this time, and concern of engaging more in self harm when not drinking. Patient is oriented x4.   Active interventions to stabilize mental health symptoms:  Patient reports taking medications as prescribed, attending weekly therapy. Counselor checking in with patient at each appointment regarding SIB and SI, patient continues to be in agreement to follow safety plan. Patient reported some inconsistency with medications, but reported that he has return to taking them at scheduled times. Patient reported plan to follow up with psychiatrist regarding increase depressive symptoms and SI.   Specific goals from treatment plan addressed this week:  Patient reported continuing to work on boundaries with others, reduced SI this week, continuing to discuss with counselor ways to increase safety. Patient completed referral form for DBT at [facility].   Effectiveness of strategies:  Strategies appear effective, staff to continue to monitor.   Dimension #4 - Readiness for Change - Risk 0. Patient verbalizes that he feels that he is here on his own regard, despite being recommended by his psychiatrist. Patient does verbalize that he believes his use is problematic, and that he wants help to change at this time. Patient verbalizes willingness to follow all recommendations made for him while in treatment. Patient appears genuinely motivated for treatment and change at this time.  Specific goals from treatment plan addressed this week:  Patient attended 2/2 groups this week, and attended 2/2 scheduled individual appointments. Counselor and patient briefly discussed plan for patient to start phase III on 9/17/19.   Effectiveness of strategies:  Strategies appear effective.   Dimension #5 - Relapse Potential - Risk 3. Patient reports regular use of alcohol up until 6/5/19, and so he therefore may currently lack the necessary coping skills to help him prevent  relapse at this time. He indicated that he struggles with impulse control which could indicate an increased risk of relapse at this time. Patient does report some past treatment experience and a history of periods of sustained sobriety. He therefore may have some knowledge of the skills needed to maintain sobriety, however it does not appear that he has been utilizing those skills in the past 6 months. Patient's lack of current skills, mental health, and impulse control issues appear to indicate an increased risk of relapse at this time. Patient has had 2 relapses in outpatient treatment.   Specific goals from treatment plan addressed this week:  Patient reported minimal urges and no use of substances this week.   Effectiveness of strategies:  Strategies appear effective.   Dimension #6 - Recovery Environment - Risk 2. Patient currently works full-time, and do he does appear to have some structure for his daily life. Patient struggled to identify sober hobbies, so he does appear to lack meaningful activity outside of work at this time. Patient states that he currently has a stable place to live, however it is not always the most supportive as he sometimes isolates. Patient indicated peer support for sobriety, and that he has been attending support groups, so he does have some support for his sobriety. He states that his family does not know about his use currently, and that they would not be supportive of him if they were made aware. Patient denies any current legal involvement.   Specific goals from treatment plan addressed this week:  Patient reported reaching out to others, struggling to get out more. Patient reported plan to spend part of his weekend in [place].   Effectiveness of strategies:  Strategies appear effective.   T) Treatment plan updated: no  Patient notified and in agreement: N/A   Patient educated on Dual Diagnosis II. Patient has completed 120 of 90 program hours at this time, patient is in  phase II. Patient is expected to complete phase II on 9/13/19, and start phase III on 9/17/19. Current discharge plan is phase III/ program.     EVAN Blunt, [facility]  9/7/2019, 1:41 PM       Weekly Educational Topics Date   1. Dual Diagnoses, week 1 8/26, 8/28, 8/30   2. Dual Diagnoses, week 2 9/4, 9/6   3. Stress Management 8/12, 8/16 8/19, 8/23   4. Feelings/Emotions    5. Thinking 6/14   6. Change 6/17, 6/19, 6/21   7. Recovery Support 6/24, 6/26, 6/28   8. Relationships/Communication 7/1, 7/3, 7/5   9. Addiction 101 7/8, 7/10, 7/12   10. Relapse Prevention 7/15, 7/17, 7/19   11. Grief and Loss 7/22, 7/24, 7/26   12. Strengths 7/29, 7/31, 8/2   13. Wellness 8/5, 8/7, 8/9   Mindfulness  6/14, 6/21, 6/28, 7/5, 7/12, 7/19, 7/26, 8/2, 8/9, 8/16, 8/30, 9/6

## 2021-06-01 NOTE — PROGRESS NOTES
Mental Health Visit Note    9/6/2019    Start time: 332  Stop Time: 440   Session # 8    Session 53+ due to patient's self-harming behaviors.     Session Type: Patient is presenting for an Individual session.    Cogn Tuttle is a 28 y.o. male is being seen today for    Chief Complaint   Patient presents with     MH Follow Up     Self-esteem     Present For Session: Patient and therapist     New symptoms or complaints: None    Functional Impairment:   Personal: 3  Family: 3  Work: 2  Social:3    Clinical assessment of mental status: Mr. Tuttle presented on time for scheduled session today.  He was open and cooperative, and dressed appropriately for this session and weather. His memory was good for both short and long term.  His speech and language was soft and concise throughout the session.  Concentration and focus is within normal limits. Psychosis is not noted or reported. He reports his mood is down.  Affect is congruent with speech.  Fund of knowledge is adequate. Insight is adequate for therapy. Reviewed and changes made as needed.     Suicidal/Homicidal Ideation present: Patient denied current suicidal ideations, plans and/or means. Patient continues to agree to follow safety plan by calling 911 and/or reporting to ER if suicidal ideations were to occur again.    Patient's impression of their current status: Patient indicated feeling down. Patient reported he is struggling with his self-esteem. Patient indicated he made a list of ways he views himself as ugly. Patient reported some of the things are what people have told him and other's are one's that he believes himself. Patient indicated he see's his self-harm as a way to make himself feel ugly. Patient reported he continues to struggle with self-harm almost daily. Patient indicated right now he is struggling a lot with change. Patient reported one of the counselors is retiring, another group member is going to higher level of care and this therapist  will be going on medical leave. Patient indicated this leaves him feeling vulnerable.     Therapist impression of patients current state: Patient appears to have fair insight into his mental health. This therapist challenged patient on what he does like about himself. This therapist gave patient homework assignment of writing 2 things daily he likes about himself and what he thinks other people like about him. This therapist processed with patient change and radical acceptance. This therapist challenged patient on ways he has had to deal with change in the past. This therapist reminded patient if any suicidal ideations was to occur to call 911 and/or report to ER and patient agreed. Patient continues to have a couple of his safety plan.     Type of psychotherapeutic technique provided: Insight oriented, Client centered and CBT    Progress toward short term goals: Progress as expected with patient returning to scheduled session, using skills taught is session,  and maintaining sobriety.     Review of long term goals: Treatment Plan Updated on 07/26/2019    Diagnosis:   1. Major depressive disorder, recurrent episode, moderate (H)    2. JOSSIE (generalized anxiety disorder)    3. Moderate alcohol use disorder (H)      Plan and Follow up: Patient appears to have fair insight into his mental health. Patient would benefit from identifying what he wants in a friendship. Patient should work on using skills taught in session to work on reducing self-harm. Patient would benefit from using skills taught in session to help with the transition of group at this time.Patient would benefit from writing 2 things he likes about himself and what he thinks other people like about him.  Patient should call 911 and/or report to ER if suicidal ideations were to occur.     Discharge Criteria/Planning: Patient will continue with follow-up until therapy can be discontinued without return of signs and symptoms.    Maria Del Rosario Anand 9/10/2019

## 2021-06-02 NOTE — PROGRESS NOTES
Mental Health Visit Note    10/30/2019    Start time: 801  Stop Time: 849   Session # 17    Session Type: Patient is presenting for an Individual session.    Cong Tuttle is a 29 y.o. male is being seen today for    Chief Complaint   Patient presents with     MH Follow Up     Self-harm     Present For Session: Patient and therapist     New symptoms or complaints: None    Functional Impairment:   Personal: 3  Family: 3  Work: 2  Social:3    Clinical assessment of mental status: Mr. Tuttle presented on time for scheduled session today.  He was open and cooperative, and dressed appropriately for this session and weather. His memory was good for both short and long term.  His speech and language was soft and concise throughout the session.  Concentration and focus is within normal limits. Psychosis is not noted or reported. He reports his mood is sad.  Affect is congruent with speech.  Fund of knowledge is adequate. Insight is adequate for therapy. Changes made as needed.     Suicidal/Homicidal Ideation present: Patient denied current suicidal ideations, plans and/or means. Patient agreed to call 911 and/or report to ER if suicidal ideations were to occur. Patient also agreed to follow suicide safety plan.     Patient's impression of their current status: Patient indicated feeling sad. Patient reported relapsing on Friday with alcohol and cannabis. Patient indicated first drinking and then going to [facility]. Patient reported while there smoking the cannabis. Patient indicated then feeling taken advantage of by the women. Patient reported he is still trying to decide if he wants to file a police report. Patient indicated knowing he was not in a good place and reaching out to his friend. Patient reported then reaching out to another friend on Sunday. Patient indicated once the friend left self-harming and calling the friend back. Patient reported his friend then removed all sharp objects. Patient indicated on Monday  presenting to the clinic to talk to his CD counselor who was not here but he was assisted by another counselor to the ED. Patient reported then going back to work where he went on short term disability. Patient indicated with everything happening making the decision to do the [facility] inpatient. Patient reported knowing he needs to start making a change in feeling inpatient is the best option. Patient indicated he plans to do the [facility] until he is able to get inpatient.     Therapist impression of patients current state: Patient appears to have fair insight into his mental health. This therapist processed with patient his reasoning behind his self-harm which he indicated was to help with the emotional pain. Patient reported at no point was he trying to kill himself. This therapist processed with patient ways he felt taken advantage of and how this is emotionally affecting him. This therapist processed with patient if he is not able to get into a PHP program at [facility] program that this therapist recommends a PHP program through [facility] until he is able to get inpatient. This therapist provided if with crises lines and the warm line. Patient again denied any current suicidal ideations, plans and/or means.     Type of psychotherapeutic technique provided: Insight oriented, Client centered and CBT    Progress toward short term goals: Progress as expected with patient returning to scheduled session and reaching out to friends.   Review of long term goals: Treatment Plan Updated on 10/25/2019    Diagnosis:   1. Major depressive disorder, recurrent episode, moderate (H)    2. JOSSIE (generalized anxiety disorder)    3. Moderate alcohol use disorder (H)      Plan and Follow up: Patient appears to have fair insight into his mental health. Patient would benefit from identifying what he wants in a friendship. Patient should work on using skills taught in session to work on reducing self-harm. Patient would benefit from working  staying in the moment and not jumping to conclusions. Patient would benefit from using assertive communication with his parents. Patient should work on self-care at work. Patient would benefit from looking at the path he has choices and things he has accomplished. {atient needs to reach out to his support network. If patient is not able to get into the [facility] he should reach out to this therapist who will put a regeral in for [facility]. Patient should call 911 and/or report to ER if suicidal ideations were to occur.     Discharge Criteria/Planning: Patient will continue with follow-up until therapy can be discontinued without return of signs and symptoms.    Maria Del Rosario Backer 10/30/2019

## 2021-06-02 NOTE — PROGRESS NOTES
Mental Health Visit Note    10/16/2019    Start time: 901  Stop Time: 948   Session # 15    Session Type: Patient is presenting for an Individual session.    Cong Tuttle is a 29 y.o. male is being seen today for    Chief Complaint   Patient presents with      Follow Up     Work Stress     Present For Session: Patient and therapist     New symptoms or complaints: None    Functional Impairment:   Personal: 3  Family: 3  Work: 2  Social:3    Clinical assessment of mental status: Mr. Tuttle presented on time for scheduled session today.  He was open and cooperative, and dressed appropriately for this session and weather. His memory was good for both short and long term.  His speech and language was soft and concise throughout the session.  Concentration and focus is within normal limits. Psychosis is not noted or reported. He reports his mood is stressed.  Affect is congruent with speech.  Fund of knowledge is adequate. Insight is adequate for therapy. Reviewed but no changes made for this session.     Suicidal/Homicidal Ideation present: Patient denied current suicidal ideations, plans and/or means. Patient agreed to call 911 and/or report to ER if suicidal ideations were to occur. Patient also agreed to follow suicide safety plan.     Patient's impression of their current status: Patient indicated feeling stressed. Patient reported having the work meeting. Patient indicated realizing that he needs to work on accepting that things will not change at work. Patient reported this causing him a lot of frustration and thinking that he wants to switch jobs but then realizing that is not realistic at this time. Patient indicated he struggled with turning 29. Patient reported feeling that he should have what other people have like a house and a wife. Patient indicated this weekend being extremely difficult for him.       Therapist impression of patients current state: Patient appears to have fair insight into his  mental health. This therapist processed with patient ways he can work on self-care at work. This therapist challenged patient on ways he can look at the positive of his job and what he is wanting to accomplishing while at this job. This therapist processed with patient different paths that he has taken in life and that he has done and accomplished things that other people have not in life.     Type of psychotherapeutic technique provided: Insight oriented, Client centered and CBT    Progress toward short term goals: Progress as expected with patient returning to scheduled session, using skills taught is session,  and maintaining sobriety.     Review of long term goals: Treatment Plan Updated on 07/26/2019    Diagnosis:   1. Major depressive disorder, recurrent episode, moderate (H)    2. JOSSIE (generalized anxiety disorder)    3. Moderate alcohol use disorder (H)      Plan and Follow up: Patient appears to have fair insight into his mental health. Patient would benefit from identifying what he wants in a friendship. Patient should work on using skills taught in session to work on reducing self-harm. Patient would benefit from working staying in the moment and not jumping to conclusions. Patient would benefit from using assertive communication with his parents. Patient should work on self-care at work. Patient would benefit from looking at the path he has choices and things he has accomplished. Patient should call 911 and/or report to ER if suicidal ideations were to occur.     Discharge Criteria/Planning: Patient will continue with follow-up until therapy can be discontinued without return of signs and symptoms.    Maria Del Rosario Pachecoer 10/16/2019
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
## 2021-06-02 NOTE — PROGRESS NOTES
Cong Tuttle attended 2 hours of group today.     The group topic was 2, patient was responsive to topic.     Patient's engagement in the group session: low     Total group size: 5      VALENCIA Blunt  10/29/2019, 11:13 AM

## 2021-06-02 NOTE — PROGRESS NOTES
LATE ENTRY FOR 10/15/19    D. Counselor met with patient for 60 minutes to discuss treatment progress. A. Counselor processed with patient frustrations related to his work, problem-solved ways to address problems with co-worker, or to consider looking at other jobs. Counselor validated patient's feelings of pain related to the end of a relationship, but also challenged patient to consider ways that this relationship was not perfect. Counselor encouraged patient to remember in almost all situations he has options, even if some of the options are not things that fit within his values or seem like a good option. Counselor assessed for SI. R. Patient reported feeling stuck in his work, particularly that things will not change with another co-worker. Patient identified he hadn't even considered looking for another job, responded that even thinking about other jobs makes him feel less stuck. Patient discussed finding old notes from a past relationship, which brought up difficult feelings. Patient reported that it is difficult to see any of the negatives to this relationship. Patient reported feeling like he doesn't have options for a relationship right now, but also acknowledged that there may be options that he hasn't considered. Patient reported no current suicidal ideation, no intent, plan or means. T. Patient to continue with phase III.     Patient treatment was reviewed. Changes were not not made. Patient was actively and directly involved in the treatment plan review and updates.     EVAN Blunt, [facility]  10/16/2019, 8:46 AM

## 2021-06-02 NOTE — PROGRESS NOTES
Counselor met with patient for 20 minutues to discuss plan for patient to start inpatient ED treatment at the [facility] on 11/4/19. Counselor encouraged patient for making this decision, also encouraged patient to be aware of boundaries and discuss with staff that boundaries have been an issue in the past. Patient reported plan to stay in touch with counselor via phone, particularly related to plans for discharge. Patient reported no urges for self-harm and no SI, intent, plan or means. Counselor to discharge patient as of 11/4/19, due to referral to higher level of care.     EVAN Blunt, [facility]  11/1/2019, 4:55 PM

## 2021-06-02 NOTE — PROGRESS NOTES
Weekly Progress Note  Cong Tuttle  1990  [id number]      D) Pt attended 1/1 groups this week with 0 absences, patient is in phase III. Patient attended 1 individual sessions this week on 10/22/19  A) Staff facilitated groups and reviewed tx progress. Assessed for [facility]. R) No VAP needed at this time.     Any significant events, defines as events that impact patients relationship with others inside and outside of treatment: Yes: patient was released from hospital on 6/10/19. Patient reported that another patient had requested to move into his apartment (see 1:1 appointment on 7/3/19). Patient has communicated to the other patient that she is not allowed to move in, this patient to communicate to staff if there are any more requests from the other patient. Patient reported on 8/26/19 that he had suicide attempt on 8/25/19 via strangulation (see documentation). Patient presented to [facility] on 10/28/19 with bleeding lacerations, staff brought patient to ED. Patient reported minimal SI this week, reports no current intent. Patient reported on 10/29/19 that he has an intake for inpatient ED treatment at [facility] within the next week.   Indicate any changes or monitoring of physical or mental health problems: Yes: patient reports ongoing daily self harm, last reported on 10/28/19. Patient reported continued SI, reported continued commitment to following safety plan.   Indicate involvement by any outside supports: Yes: psychiatrist in [place], therapist at [facility], DBT program through [facility]. Counselor in communication with patient's therapist regarding safety concerns. Counselor spoke to patient's psychiatrist on 10/18/19.   IAPP reviewed and modified as needed: NA    Pt working on the following dimensions:  Dimension #1 - Withdrawal Potential - Risk 0. Patient reports last use of alcohol on 6/5/19 at intake, most recent pattern of use has been 3-9 drinks 2-4x per week. Patient was  not requested to complete a UA on 6/19/19 which was positive for benzodiazepines, patient is currently prescribed klonopin, did not indicate recent alcohol use. Patient reported having two glasses of wine on 6/21/19. Patient reported drinking a beer on 7/8/19. Patient was requested to complete a UA on 7/5/19, results indicated that it was diluted. Patient was requested to complete another UA on 7/8/19, which was negative for all tested substances, including patient's prescribed benzodiazepines. These results were reviewed with patient, patient presented current medication, reported he has been taking it, and also reports that he has been drinking large amounts of fluids, with restricted eating during the day, which may be effective results. Patient was requested to complete a UA on 8/5/19 which was positive for benzodiazepines, which the patient is currently prescribed. Patient contacted counselor on 10/16/19 to report he had used 1.5 bottles of cough syrup over the weekend, reported no current withdrawal symptoms.Patient does not report or display any withdrawal symptoms at this time.   Specific goals from treatment plan addressed this week:  Patient reported using alcohol and marijuana on 10/25/19.  Patient was not requested to complete a UA this week.   Effectiveness of strategies:  Staff to continue to monitor, strategies appear effective.   Dimension #2 - Biomedical - Risk 0. Patient reports no current health concerns, but does report restricting his eating. Patient has medical insurance and is able to access medical care as needed.   Specific goals from treatment plan addressed this week:  Patient reported no changes to physical health this week. Patient reported no changes to eating habits. Patient reports continued tobacco product use. Patient was seen by  ED staff for lacerations on 10/28/19.    Effectiveness of strategies:  Strategies appear effective.   Dimension #3 - Emotional/Behavioral/Cognitive -  Risk 2. Patient reports mental health diagnoses of major depressive disorder, generalized anxiety disorder, and borderline personality disorder. Patient reports that he does currently have psychiatry services that he regularly attends (weekly currently).  Patient endorses current mental health medications. Patient currently sees a therapist. Patient was recently discharged from a stay on a mental health unit due to suicidal ideation. Patient has a long history of struggling with suicidal ideation and attempts. Patient endorses active struggles with food restriction, as well as self-harm. Patient states that he greatly struggles with impulse control at this time, and concern of engaging more in self harm when not drinking. Patient is oriented x4.   Active interventions to stabilize mental health symptoms:  Patient reports taking medications as prescribed, attending weekly therapy. Patient reported attending DBT program through [facility]. Counselor checking in with patient at each appointment regarding SIB and SI, patient continues to be in agreement to follow safety plan.   Specific goals from treatment plan addressed this week:  Patient discussed significant shame related to events on 10/25/19 which he reports were a trigger to significant self harm on 10/27/19. Patient reported that he is willing to go to [facility] inpatient program, and has an intake in the next week.   Effectiveness of strategies:  Strategies appear effective.  Dimension #4 - Readiness for Change - Risk 0. Patient verbalizes that he feels that he is here on his own regard, despite being recommended by his psychiatrist. Patient does verbalize that he believes his use is problematic, and that he wants help to change at this time. Patient verbalizes willingness to follow all recommendations made for him while in treatment. Patient appears genuinely motivated for treatment and change at this time.  Specific goals from treatment plan addressed this  week:  Patient attended 1/1 groups this week, attended 1/1 individual appointments. Patient and counselor discussed plans for higher level of care, patient to be discharged from co-occurring outpatient when he is admitted to the [facility].   Effectiveness of strategies:  Strategies appear effective.   Dimension #5 - Relapse Potential - Risk 2. Patient reports regular use of alcohol up until 6/5/19, and so he therefore may currently lack the necessary coping skills to help him prevent relapse at this time. Patient reports current abstinence since 7/8/19. He indicated that he struggles with impulse control which could indicate an increased risk of relapse at this time. Patient does report some past treatment experience and a history of periods of sustained sobriety. Patient's lack of current skills, mental health, and impulse control issues appear to indicate an increased risk of relapse at this time. Patient has had 3 relapses in outpatient treatment.   Specific goals from treatment plan addressed this week:  Patient reported using alcohol and marijuana on 10/25/19. Patient identified increased depressive symptoms and the location that he was in were triggers. Patient reported no urges or use since that time.   Effectiveness of strategies:  Strategies appear somewhat ineffective.  Dimension #6 - Recovery Environment - Risk 2. Patient currently works full-time, and do he does appear to have some structure for his daily life. Patient struggled to identify sober hobbies, so he does appear to lack meaningful activity outside of work at this time. Patient states that he currently has a stable place to live, however it is not always the most supportive as he sometimes isolates. Patient indicated peer support for sobriety, and that he has been attending support groups, so he does have some support for his sobriety. He states that his family does not know about his use currently, and that they would not be supportive of  him if they were made aware. Patient denies any current legal involvement.   Specific goals from treatment plan addressed this week:  Patient reports that he is on short-term disability as of 10/28/19, reports that he has appointments scheduled every day this week to increase support. Patient reported that he has been reaching out to his mother and friends for support.   Effectiveness of strategies:  Strategies appear effective.   T) Treatment plan updated: No.  Patient notified and in agreement: NA  Patient educated on Forgiveness. Patient has completed 154 of 120 program hours at this time, patient is currently in phase III, counselor and patient in agreement to extend treatment. Discharge plan is admission to inpatient ED treatment at [facility], which the patient reports will be this week.     EVAN Blunt, [facility]  10/31/2019, 2:28 PM       Weekly Educational Topics Date   1. Dual Diagnoses, week 1 8/26, 8/28, 8/30   2. Dual Diagnoses, week 2 9/4, 9/6   3. Stress Management 8/12, 8/16 8/19, 8/23   4. Feelings/Emotions 9/9, 9/11, 9/13   5. Thinking 6/14   6. Change 6/17, 6/19, 6/21   7. Recovery Support 6/24, 6/26, 6/28   8. Relationships/Communication 7/1, 7/3, 7/5   9. Addiction 101 7/8, 7/10, 7/12   10. Relapse Prevention 7/15, 7/17, 7/19   11. Grief and Loss 7/22, 7/24, 7/26   12. Strengths 7/29, 7/31, 8/2   13. Wellness 8/5, 8/7, 8/9   Mindfulness  6/14, 6/21, 6/28, 7/5, 7/12, 7/19, 7/26, 8/2, 8/9, 8/16, 8/30, 9/6, 9/13     Weekly Educational Topics Date   Boundaries    Suicidal ideation 9/24   Acceptance 10/1    Relationships 10/8, 10/22    Relapse Prevention 10/15    Forgiveness 10/29

## 2021-06-02 NOTE — PROGRESS NOTES
Weekly Progress Note  Cong Tuttle  1990  [id number]      D) Pt attended 1/1 groups this week with 0 absences, patient is in phase III. Patient attended 1 individual sessions this week on 10/8/19  A) Staff facilitated groups and reviewed tx progress. Assessed for [facility]. R) No VAP needed at this time.     Any significant events, defines as events that impact patients relationship with others inside and outside of treatment: Yes: patient was released from hospital on 6/10/19. Patient reported that another patient had requested to move into his apartment (see 1:1 appointment on 7/3/19). Patient has communicated to the other patient that she is not allowed to move in, this patient to communicate to staff if there are any more requests from the other patient. Patient reported on 8/26/19 that he had suicide attempt on 8/25/19 via strangulation (see documentation). Patient reported urge for suicide on 10/6/19, reports no current intent.   Indicate any changes or monitoring of physical or mental health problems: Yes: patient reports ongoing daily self harm, last reported on 10/8/19. Patient reported continued SI, reported continued commitment to following safety plan.   Indicate involvement by any outside supports: Yes: psychiatrist in [place], therapist at [facility], DBT program through [facility]. Counselor in communication with patient's therapist regarding safety concerns.  IAPP reviewed and modified as needed: NA    Pt working on the following dimensions:  Dimension #1 - Withdrawal Potential - Risk 0. Patient reports last use of alcohol on 6/5/19 at intake, most recent pattern of use has been 3-9 drinks 2-4x per week. Patient was not requested to complete a UA on 6/19/19 which was positive for benzodiazepines, patient is currently prescribed klonopin, did not indicate recent alcohol use. Patient reported having two glasses of wine on 6/21/19. Patient reported drinking a beer on 7/8/19. Patient was  requested to complete a UA on 7/5/19, results indicated that it was diluted. Patient was requested to complete another UA on 7/8/19, which was negative for all tested substances, including patient's prescribed benzodiazepines. These results were reviewed with patient, patient presented current medication, reported he has been taking it, and also reports that he has been drinking large amounts of fluids, with restricted eating during the day, which may be effective results. Patient was requested to complete a UA on 8/5/19 which was positive for benzodiazepines, which the patient is currently prescribed. Patient does not report or display any withdrawal symptoms at this time.   Specific goals from treatment plan addressed this week:  Patient reported no substance use this week. Patient was not requested to complete a UA this week.   Effectiveness of strategies:  Strategies appear effective.   Dimension #2 - Biomedical - Risk 0. Patient reports no current health concerns, but does report restricting his eating. Patient has medical insurance and is able to access medical care as needed.   Specific goals from treatment plan addressed this week:  Patient reported no changes to physical health this week. Patient reported no changes to eating habits. Patient reports continued tobacco product use.   Effectiveness of strategies:  Strategies appear effective.   Dimension #3 - Emotional/Behavioral/Cognitive - Risk 2. Patient reports mental health diagnoses of major depressive disorder, generalized anxiety disorder, and borderline personality disorder. Patient reports that he does currently have psychiatry services that he regularly attends (weekly currently).  Patient endorses current mental health medications. Patient currently sees a therapist. Patient was recently discharged from a stay on a mental health unit due to suicidal ideation. Patient has a long history of struggling with suicidal ideation and attempts. Patient  endorses active struggles with food restriction, as well as self-harm. Patient states that he greatly struggles with impulse control at this time, and concern of engaging more in self harm when not drinking. Patient is oriented x4.   Active interventions to stabilize mental health symptoms:  Patient reports taking medications as prescribed, attending weekly therapy. Patient reported attending DBT program through [facility]. Counselor checking in with patient at each appointment regarding SIB and SI, patient continues to be in agreement to follow safety plan.   Specific goals from treatment plan addressed this week:  Patient discussed attending assessment at [facility], reported initial referral was to inpatient/residential treatment, but will start with ED therapist on outpatient basis next week. Patient reported that he has been working on getting out of his comfort zone and trying new things. Patient reports continued daily self-harm, goal to reduce frequency.   Effectiveness of strategies:  Strategies appear effective, staff to continue to monitor.   Dimension #4 - Readiness for Change - Risk 0. Patient verbalizes that he feels that he is here on his own regard, despite being recommended by his psychiatrist. Patient does verbalize that he believes his use is problematic, and that he wants help to change at this time. Patient verbalizes willingness to follow all recommendations made for him while in treatment. Patient appears genuinely motivated for treatment and change at this time.  Specific goals from treatment plan addressed this week:  Patient attended 1/1 groups this week, attended 1/1 individual appointments. Patient was active during group. Patient reported he did not accomplish goal of SIB only 6 days out of the week instead of 7, reports this is still a goal for this week.   Effectiveness of strategies:  Strategies appear effective.   Dimension #5 - Relapse Potential - Risk 2. Patient reports regular use of  alcohol up until 6/5/19, and so he therefore may currently lack the necessary coping skills to help him prevent relapse at this time. Patient reports current abstinence since 7/8/19. He indicated that he struggles with impulse control which could indicate an increased risk of relapse at this time. Patient does report some past treatment experience and a history of periods of sustained sobriety. He therefore may have some knowledge of the skills needed to maintain sobriety, however it does not appear that he has been utilizing those skills in the past 6 months. Patient's lack of current skills, mental health, and impulse control issues appear to indicate an increased risk of relapse at this time. Patient has had 2 relapses in outpatient treatment.   Specific goals from treatment plan addressed this week:  Patient reported continued urges for marijuana use. Patient identified that he was able to let the urge for marijuana pass.   Effectiveness of strategies:  Strategies appear effective.   Dimension #6 - Recovery Environment - Risk 2. Patient currently works full-time, and do he does appear to have some structure for his daily life. Patient struggled to identify sober hobbies, so he does appear to lack meaningful activity outside of work at this time. Patient states that he currently has a stable place to live, however it is not always the most supportive as he sometimes isolates. Patient indicated peer support for sobriety, and that he has been attending support groups, so he does have some support for his sobriety. He states that his family does not know about his use currently, and that they would not be supportive of him if they were made aware. Patient denies any current legal involvement.   Specific goals from treatment plan addressed this week:  Patient reports continuing to work full-time, reported some conflict related to the frequency that he is out of work due to appointments. Patient reported getting out  of his apartment more and going out to dinner with friends, identified needing to continue to work on not buying things for other people.   Effectiveness of strategies:  Strategies appear effective.   T) Treatment plan updated: No.  Patient notified and in agreement: NA  Patient educated on Relationships. Patient has completed 145 of 120 program hours at this time, patient is currently in phase III, counselor and patient in agreement to extend treatment. Discharge plan is resources in the community/mental health services.     EVAN Blunt, [facility]  10/8/2019, 4:43 PM       Weekly Educational Topics Date   1. Dual Diagnoses, week 1 8/26, 8/28, 8/30   2. Dual Diagnoses, week 2 9/4, 9/6   3. Stress Management 8/12, 8/16 8/19, 8/23   4. Feelings/Emotions 9/9, 9/11, 9/13   5. Thinking 6/14   6. Change 6/17, 6/19, 6/21   7. Recovery Support 6/24, 6/26, 6/28   8. Relationships/Communication 7/1, 7/3, 7/5   9. Addiction 101 7/8, 7/10, 7/12   10. Relapse Prevention 7/15, 7/17, 7/19   11. Grief and Loss 7/22, 7/24, 7/26   12. Strengths 7/29, 7/31, 8/2   13. Wellness 8/5, 8/7, 8/9   Mindfulness  6/14, 6/21, 6/28, 7/5, 7/12, 7/19, 7/26, 8/2, 8/9, 8/16, 8/30, 9/6, 9/13     Weekly Educational Topics Date   Boundaries    Suicidal ideation 9/24   Acceptance 10/1    Relationships 10/8

## 2021-06-02 NOTE — PROGRESS NOTES
Please call and let him know that his labs yesterday found normal urine, kidney function, liver tests, thyroid function, lipids, and glucose.  His HIV and Hepatitis C screens were negative.  His hemoglobin is 13.8 with normal being 14 so not worrisome at all.  Please let me know if he has any questions.  Thank you.  REX

## 2021-06-02 NOTE — PROGRESS NOTES
Mental Health Visit Note    11/1/2019    Start time: 950  Stop Time: 1020   Session # 18    Session Type: Patient is presenting for an Individual session.    Cong Tuttle is a 29 y.o. male is being seen today for    Chief Complaint   Patient presents with     MH Follow Up     Fear     Present For Session: Patient and therapist     New symptoms or complaints: None    Functional Impairment:   Personal: 3  Family: 3  Work: 2  Social:3    Clinical assessment of mental status: Mr. Tuttle presented on time for scheduled session today.  He was open and cooperative, and dressed appropriately for this session and weather. His memory was good for both short and long term.  His speech and language was soft and concise throughout the session.  Concentration and focus is within normal limits. Psychosis is not noted or reported. He reports his mood is fearful.  Affect is congruent with speech.  Fund of knowledge is adequate. Insight is adequate for therapy. Reviewed and changes made as needed.     Suicidal/Homicidal Ideation present: Patient denied current suicidal ideations, plans and/or means. Patient agreed to call 911 and/or report to ER if suicidal ideations were to occur. Patient also agreed to follow suicide safety plan.     Patient's impression of their current status: Patient presented to clinic after he missed his appointment at 7 am today and this therapist agreed to meet with him for 30 minutes. Patient indicated having a lot of fear right now. Patient reported he is going inpatient at the [facility] on Monday. Patient indicated he has a lot of sadness over the fact that he will miss his cousins wedding. Patient reported thinking the wait would be longer to get a bed and that he would be able to attend the wedding. Patient indicated he is also sad that he likely will miss the holiday's. Patient reported reminding himself that this is what is needed for him.    Therapist impression of patients current state:  Patient appears to have fair insight into his mental health. This therapist processed with patient his emotions related to going inpatient. This therapist processed with patient the importance of his own self-care and this being a way he is caring for himself.     Type of psychotherapeutic technique provided: Insight oriented, Client centered and CBT    Progress toward short term goals: Progress as expected with patient returning to scheduled session and reaching out to friends.     Review of long term goals: Treatment Plan Updated on 10/25/2019    Diagnosis:   1. Major depressive disorder, recurrent episode, moderate (H)    2. JOSSIE (generalized anxiety disorder)    3. Moderate alcohol use disorder (H)      Plan and Follow up: Patient will be inpatient at the [facility] starting 11.4.2019. This therapist will likely be on medical level when patient return and will work with VALENCIA Blunt. Patient agreed to call 911 and/or report to ER if any suicidal ideations were to occur.     Discharge Criteria/Planning: Patient will continue with follow-up until therapy can be discontinued without return of signs and symptoms.    Maria Del Rosario Anand 11/1/2019

## 2021-06-02 NOTE — PROGRESS NOTES
Cong Tuttle attended 2 hours of group today.     The group topic was Relationships, patient was responsive to topic.     Patients engagement in the group session: high     Total group size: 4      VALENCIA Blunt  10/8/2019, 11:19 AM

## 2021-06-02 NOTE — PROGRESS NOTES
"Patient presented to clinic stating that he needed to speak with his counselor. His counselor was out of the office and the  staff found Sulema Houston and asked her to speak with him. Sulema spoke with the patient and then called writer to inform writer of the situation. Sulema stated that patient said he had a \"bad weekend\" and she noticed lacerations on his arms and dried blood. Writer went out to speak with patient and explained that his counselor was out of the office. Due to the severity of the lacerations, writer told patient that the priority at this time was for him to receive medical attention. Writer stated that patient's health is a priority and the ED would be able to look at his wounds and assess them for appropriate care, which is something writer is unable to do. Patient asked if writer could walk him to the ED. Writer walked patient down to the ED and patient showed him a list of things he promised his friend he would do including 1. Talk with Elinor 2. Go in if he was having suicidal ideations, and 3. Go to Urgent Care if blood soaked through the bandages. Writer asked if patient was having any SI currently, to which patient denied. Writer provided contact information to  for triage to call if needed.   "

## 2021-06-02 NOTE — PROGRESS NOTES
This therapist left a Vm asking patient to call back after he did not show up for his scheduled session. This is patient's first no show.

## 2021-06-02 NOTE — PROGRESS NOTES
Cong Tuttle attended 2 hours of group today.     The group topic was Relapse Prevention, patient was responsive to topic.     Patient's engagement in the group session: high     Total group size: 5      VALENCIA Blunt  10/15/2019, 11:19 AM

## 2021-06-02 NOTE — PROGRESS NOTES
Outpatient Mental Health Treatment Plan    Name:  Cong Tuttle  :  1990  MRN:  [id number]    Treatment Plan:  Updated Treatment Plan  Intake/initial treatment plan date:  2019  Benefit and risks and alternatives have been discussed: Yes  Is this treatment appropriate with minimal intrusion/restrictions: Yes  Estimated duration of treatment:  Ongoing therapy at this time  Anticipated frequency of services:  Weekly  Necessity for frequency: This frequency is needed to establish therapeutic goals and for continuity of care in order to monitor progress.  Necessity for treatment: To address cognitive, behavioral, and/or emotional barriers in order to work toward goals and to improve quality of life.    Session Type: Patient is presenting for an Individual session.    Plan:           ?   ? Anxiety    Goal:  Decrease average anxiety level from 4 to 1.   Strategies: ? [x]Learn and practice relaxation techniques and other coping        strategies (e.g., thought stopping, reframing, meditation)     ? [x] Increase involvement in meaningful activities     ? [x] Discuss sleep hygiene     ? [x] Explore thoughts and expectations about self and others     ? [x] Identify and monitor triggers for panic/anxiety symptoms     ? [x] Implement physical activity routine (with physician approval)     ? [] Consider introduction of bibliotherapy and/or videos     ? [x] Continue compliance with medical treatment plan (or explore          barriers)                                       []     ?Degree to which this is a problem: 4  Degree to which goal is met: 2    Date of next review: 2020       ? Depression    Goal:  Decrease average depression level from 4 to 1.   Strategies:    ?[x] Decrease social isolation     [x] Increase involvement in meaningful activities     ?[x] Discuss sleep hygiene     ?[x] Explore thoughts and expectations about self and others     ?[x] Process grief (loss of significant person,  independence, role,        etc.)     ?[x] Assess for suicide risk     ?[x] Implement physical activity routine (with physician approval)     [x] Consider introduction of bibliotherapy and/or videos     [x] Continue compliance with medical treatment plan (or explore         barriers)       ?  Degree to which this is a problem: 4  Degree to which goal is met: 1    Date of next review: 01/26/2020    Substance use  Goal:  Maintain sobriety .   Strategies: ? [] Consider referral for chemical dependency evaluation     ? [x] Discuss barriers to participating in AA or other peer-facilitated           groups         [x] Address environmental factors which may interfere with                                                    sobriety     ? [x] Explore short-term versus long-term consequences of use     ? [x] Continue compliance with medical treatment plan (or explore          barriers)     ?  Degree to which this is a problem: 4  Degree to which goal is met: 2    Date of next review: 01/26/2020       Functional Impairment:  1=Not at all/Rarely  2=Some days  3=Most Days  4=Every Day    Personal : 4  Family : 3  Social : 3  Work/school : 4    Diagnosis:  Major depressive disorder, recurrent, moderate; Generalized Anxiety disorder; Alcohol use disorder    WHODAS 2.0 12-item version: 38.58%      Scores presented in qualifiers to represent level of disability.  MODERATE Problem (medium, fair, ...) 25-49%    H1= 30  H2= 15  H3= 15        Clinical assessments and measures completed:. JOSSIE-7, PHQ-9 and CAGE-AID, CSSR     Strengths/personal resources:  (what does the patient do well, what is going well in life, positive personality characteristics):  Patient reported his strengths includes running, empathetic, compassionate and caring.     Weakness:  Patient indicated his weaknesses includes boundaries, self-worth, self-esteem and being assertive.     Cultural Considerations: Patient is a 28 year old single  male.     Persons  responsible for this plan: Patient, Provider and Other: VALENCIA Blunt, ATIF            Psychotherapist Signature           Patient Signature:              Guardian Signature             Provider: Performed and documented by SYLVIA Albarado   Date:  10/25/2019

## 2021-06-02 NOTE — PROGRESS NOTES
WHITNEY. Counselor met with patient for 50 minutes to discuss treatment progress. A. Counselor processed with patient his recent relapse and encouraged patient to consider different ways for him to cope with difficult emotions. Counselor challenged patient regarding his thinking, encouraged patient to consider that people are likely not completely all good or all bad. Counselor discussed option of attending SUSAN meetings in the community, provided patient with information. Counselor discussed plan for patient to complete phase III of treatment in the next few weeks, with plan for patient to continue with individual therapy, DBT and ED programming. Counselor assessed for SI, and abiliity and willingness to follow safety plan. R. Patient reported that he used cough syrup primarily as a way to avoid difficult feeling associated with his birthday. Patient discussed his frustrations related to his work and constraints related to attending appointments, appear somewhat receptive to counselor's feedback about black and white thinking. Patient reported plan to follow up with his therapist regarding plan for discontinuing group and transferring therapists. Patient reported some recent SI, but no intent, plan or means, willing to follow safety plan. T. Patient to continue with phase III.     Patient treatment was reviewed. Changes were not made. Patient was actively and directly involved in the treatment plan review and updates.     EVAN Blunt, [facility]  10/22/2019, 4:15 PM

## 2021-06-02 NOTE — PROGRESS NOTES
Mental Health Visit Note    10/11/2019    Start time: 231  Stop Time: 315   Session # 14    Session Type: Patient is presenting for an Individual session.    Cong Tuttle is a 29 y.o. male is being seen today for    Chief Complaint   Patient presents with     MH Follow Up     Stress     Present For Session: Patient and therapist     New symptoms or complaints: None    Functional Impairment:   Personal: 3  Family: 3  Work: 2  Social:3    Clinical assessment of mental status: Mr. Tuttle presented on time for scheduled session today.  He was open and cooperative, and dressed appropriately for this session and weather. His memory was good for both short and long term.  His speech and language was soft and concise throughout the session.  Concentration and focus is within normal limits. Psychosis is not noted or reported. He reports his mood is stressed.  Affect is congruent with speech.  Fund of knowledge is adequate. Insight is adequate for therapy. No changes for this session.     Suicidal/Homicidal Ideation present: Patient denied current suicidal ideations, plans and/or means. Patient agreed to call 911 and/or report to ER if suicidal ideations were to occur. Patient also agreed to follow suicide safety plan.     Patient's impression of their current status: Patient reported still feeling stressed. Patient indicated getting a call late at night from HR that they want to meet with him to go over his schedule. Patient reported he has been having a lot of negative thoughts about what this meeting may mean. Patient indicated he is not happy at his job but committed to staying for a year. Patient reported he is thinking more about doing the [facility]. Patient indicated when he is realistic with himself he does not feel that outpatient once a week will help to change his eating habits. Patient reported knowing he will not have the support from his parents.     Therapist impression of patients current  state: Patient appears to have fair insight into his mental health. This therapist challenged patient on ways he is jumping to conclusions and working on staying in the moment. This therapist processed with patient ways he can work on looking at his current job as a stepping stone to not allow the person who gives him projects to negatively affect his mood. This therapist processed with patient the pros and cons of residential [facility].     Type of psychotherapeutic technique provided: Insight oriented, Client centered and CBT    Progress toward short term goals: Progress as expected with patient returning to scheduled session, using skills taught is session,  and maintaining sobriety.     Review of long term goals: Treatment Plan Updated on 07/26/2019    Diagnosis:   1. Major depressive disorder, recurrent episode, moderate (H)    2. JOSSIE (generalized anxiety disorder)    3. Moderate alcohol use disorder (H)      Plan and Follow up: Patient appears to have fair insight into his mental health. Patient would benefit from identifying what he wants in a friendship. Patient should work on using skills taught in session to work on reducing self-harm. Patient would benefit from working staying in the moment and not jumping to conclusions. Patient would benefit from using assertive communication with his parents. Patient should call 911 and/or report to ER if suicidal ideations were to occur.     Discharge Criteria/Planning: Patient will continue with follow-up until therapy can be discontinued without return of signs and symptoms.    Maria Del Rosario Backer 10/15/2019

## 2021-06-02 NOTE — PROGRESS NOTES
Cong Tuttle attended 2 hours of group today.     The group topic was Relationships/Boundaries, patient was responsive to topic.     Patient's engagement in the group session: medium     Total group size: 3      VALENCIA Blunt  10/22/2019, 11:34 AM

## 2021-06-02 NOTE — TELEPHONE ENCOUNTER
Name of form/paperwork: Other:  Forms from [facility] for Short term disability  Have you been seen for this request: No  Do we have the form: yes faxed to office on Tuesday or Wednesday this week.   When is form needed by: ASAP  How would you like the form returned: see form for fax number   Fax Number: see form for fax number  Patient Notified form requests are processed in 3-5 business days: Yes  (If patient needs form sooner, please note that in this message.)  Okay to leave a detailed message? Yes    Patient was transferred to scheduling to request an appointment today. Patient stated I am going to be inpatient on Monday.

## 2021-06-02 NOTE — PROGRESS NOTES
Mental Health Visit Note    10/9/2019    Start time: 702  Stop Time: 749   Session # 13    Session Type: Patient is presenting for an Individual session.    Cong Tuttle is a 28 y.o. male is being seen today for    Chief Complaint   Patient presents with     MH Follow Up     Stress     Present For Session: Patient and therapist     New symptoms or complaints: None    Functional Impairment:   Personal: 3  Family: 3  Work: 2  Social:3    Clinical assessment of mental status: Mr. Tuttle presented on time for scheduled session today.  He was open and cooperative, and dressed appropriately for this session and weather. His memory was good for both short and long term.  His speech and language was soft and concise throughout the session.  Concentration and focus is within normal limits. Psychosis is not noted or reported. He reports his mood is stressed.  Affect is congruent with speech.  Fund of knowledge is adequate. Insight is adequate for therapy. Reviewed but no changes for this session.     Suicidal/Homicidal Ideation present: Patient denied current suicidal ideations, plans and/or means. Patient agreed to call 911 and/or report to ER if suicidal ideations were to occur. Patient also agreed to follow suicide safety plan.     Patient's impression of their current status: Patient indicated feeling stressed. Patient reported having is intake at the [facility] and them wanting him to do residential. Patient indicated he is against it due to not wanting to be stuck somewhere for more then 30 days, concern about his job and not getting support from his parents. Patient reported when he told his parents his dad laughed. Patient indicated his birthday being on Friday which he is not looking forward to. Patient reported he is suppose to go to his parents house but now wanting them to come to his place. Patient indicated he has a lot of anxiety over the thought of asking his parents to change the plans. Patient  reported if they did come to his house his brother and his friends would be able to come as well. Patient indicated he continues to believe that he would benefit from a relationship.     Therapist impression of patients current state: Patient appears to have fair insight into his mental health. This therapist processed with patient the referral recommendations and looking at the risks of his eating disorder at this time. This therapist processed with patient assertive communication skills that may be beneficial with his parents. This therapist challenged patient on what he wants for his birthday and being able to see his birthday as a positive.     Type of psychotherapeutic technique provided: Insight oriented, Client centered and CBT    Progress toward short term goals: Progress as expected with patient returning to scheduled session, using skills taught is session,  and maintaining sobriety.     Review of long term goals: Treatment Plan Updated on 07/26/2019    Diagnosis:   1. Major depressive disorder, recurrent episode, moderate (H)    2. JOSSIE (generalized anxiety disorder)    3. Moderate alcohol use disorder (H)      Plan and Follow up: Patient appears to have fair insight into his mental health. Patient would benefit from identifying what he wants in a friendship. Patient should work on using skills taught in session to work on reducing self-harm. Patient would benefit from using skills taught in session to help with the transition of group at this time.Patient would benefit from writing 2 things he likes about himself and what he thinks other people like about him.  Patient would benefit from using assertive communication with his parents. Patient should call 911 and/or report to ER if suicidal ideations were to occur.     Discharge Criteria/Planning: Patient will continue with follow-up until therapy can be discontinued without return of signs and symptoms.    Maria Del Rosario Pachecoer 10/9/2019

## 2021-06-02 NOTE — PROGRESS NOTES
[facility] Counselor met with patient for 60 minutes to discuss treatment progress. MARCELL. Counselor processed with patient the events of the weekend and patient's presentation to the clinic on 10/28/19. Counselor discussed boundary of contact with counselor only during group/1:1 times and only presenting to clinic when the patient has an appointment. Counselor discussed that the patient appears to need more services at this time and the importance of contacting crisis/emergency services when needed. Counselor encouraged patient regarding plan to go to inpatient ED treatment, encouraged patient to discuss PHP/IOP options if there is a long wait to get into inpatient ED treatment, also provided patient with contact information for [facility]. Counselor assessed for SI, ability and willingness to follow safety plan. R. Patient reported feeling upset about relapse with alcohol and marijuana and other activities on 10/25/19, reported that a friend stayed with him over the weekend, but he cut himself significantly after this friend left. Patient reported that his friend then returned, removed all sharp objects from the patient's residence and he had promised this friend to see this counselor and then to urgent care for the cuts if they bled through. Patient acknowledged that he should have received medical care first, also in agreement to utilize crisis/emergency services as needed. Patient reported understanding boundary with counselor. Patient reported that he is currently on short-term disability and plans to go to inpatient ED treatment, and has an appointment for this referral on 10/30/19. Patient in agreement to discuss with [facility] options for PHP/IOP and also to consider [facility]. Patient reported that he is remaining in contact with DBT therapist (who also provided him information about [facility]) and with his psychiatrist, both who were informed of the events over the weekend.  Patient reported no current SI, reports no urges to SH at this time, willing to follow safety plan. T. Patient to keep counselor informed with plan regarding ED programming, patient to continue with phase III until additional services can be establishing, scheduled 1:1 on 11/5/19.     Patient treatment was reviewed. Changes were not made. Patient was actively and directly involved in the treatment plan review and updates.     EVAN Blunt, [facility]  10/29/2019, 3:56 PM

## 2021-06-02 NOTE — PROGRESS NOTES
Mental Health Visit Note    10/3/2019    Start time: 701  Stop Time: 750   Session # 12    Session Type: Patient is presenting for an Individual session.    Cong Tuttle is a 28 y.o. male is being seen today for    Chief Complaint   Patient presents with      Follow Up     Work Stress     Present For Session: Patient and therapist     New symptoms or complaints: None    Functional Impairment:   Personal: 3  Family: 3  Work: 2  Social:3    Clinical assessment of mental status: Mr. Tuttle presented on time for scheduled session today.  He was open and cooperative, and dressed appropriately for this session and weather. His memory was good for both short and long term.  His speech and language was soft and concise throughout the session.  Concentration and focus is within normal limits. Psychosis is not noted or reported. He reports his mood is stressed.  Affect is congruent with speech.  Fund of knowledge is adequate. Insight is adequate for therapy. Reviewed and changes made as needed.     Suicidal/Homicidal Ideation present: Patient indicated having suicidal ideations on Monday where he was in a conference 15 stories up and wondering what it would be like to fall from there. Patient reported since Monday having no suicidal ideations, plans and/or means. Patient agreed to call 911 and/or report to ER if any suicidal ideations, plans and/or means were to occur. Patient agreed to follow treatment plan and has a copy he carries with him.     Patient's impression of their current status: Patient reported feeling feeling stressed. Patient indicated his DBT is now referring him to the [facility] as well. Patient reported this being a lot to take on at once. Patient reported he will meet with the [facility] tomorrow and have more answers. Patient indicated going to talk to HR and being extremely frustrated. Patient reported finding out that people are commenting about how much he is leaving work. Patient  indicated this being unsure why it would affect other people since he is still working the same amount of hours. Patient reported he also feels stressed over the person who gives him assessments and not feeling respected by him.    Therapist impression of patients current state: Patient appears to have fair insight into his mental health. This therapist went over safety plan with patient and patient agreed. This therapist challenged patient on what he wants from work at this time. This therapist processed with patient looking at this job as a stepping stone in his career. This therapist challenged patient on ways he has struggled to put roots down and how this can be a form of avoidance.     Type of psychotherapeutic technique provided: Insight oriented, Client centered and CBT    Progress toward short term goals: Progress as expected with patient returning to scheduled session, using skills taught is session,  and maintaining sobriety.     Review of long term goals: Treatment Plan Updated on 07/26/2019    Diagnosis:   1. Major depressive disorder, recurrent episode, moderate (H)    2. JOSSIE (generalized anxiety disorder)    3. Moderate alcohol use disorder (H)      Plan and Follow up: Patient appears to have fair insight into his mental health. Patient would benefit from identifying what he wants in a friendship. Patient should work on using skills taught in session to work on reducing self-harm. Patient would benefit from using skills taught in session to help with the transition of group at this time.Patient would benefit from writing 2 things he likes about himself and what he thinks other people like about him.  Patient should call 911 and/or report to ER if suicidal ideations were to occur.     Discharge Criteria/Planning: Patient will continue with follow-up until therapy can be discontinued without return of signs and symptoms.    Maria Del Rosario Backer 10/3/2019

## 2021-06-02 NOTE — PROGRESS NOTES
Weekly Progress Note  Cong Tuttle  1990  [id number]      D) Pt attended 1/1 groups this week with 0 absences, patient is in phase III. Patient attended 1 individual sessions this week on 10/15/19.  A) Staff facilitated groups and reviewed tx progress. Assessed for [facility]. R) No VAP needed at this time.     Any significant events, defines as events that impact patients relationship with others inside and outside of treatment: Yes: patient was released from hospital on 6/10/19. Patient reported that another patient had requested to move into his apartment (see 1:1 appointment on 7/3/19). Patient has communicated to the other patient that she is not allowed to move in, this patient to communicate to staff if there are any more requests from the other patient. Patient reported on 8/26/19 that he had suicide attempt on 8/25/19 via strangulation (see documentation). Patient reported minimal SI this week, reports no current intent.   Indicate any changes or monitoring of physical or mental health problems: Yes: patient reports ongoing daily self harm, last reported on 10/15/19. Patient reported continued SI, reported continued commitment to following safety plan.   Indicate involvement by any outside supports: Yes: psychiatrist in [place], therapist at [facility], DBT program through [facility]. Counselor in communication with patient's therapist regarding safety concerns. Counselor spoke to patient's psychiatrist on 10/18/19.   IAPP reviewed and modified as needed: NA    Pt working on the following dimensions:  Dimension #1 - Withdrawal Potential - Risk 0. Patient reports last use of alcohol on 6/5/19 at intake, most recent pattern of use has been 3-9 drinks 2-4x per week. Patient was not requested to complete a UA on 6/19/19 which was positive for benzodiazepines, patient is currently prescribed klonopin, did not indicate recent alcohol use. Patient reported having two glasses of wine on 6/21/19. Patient  reported drinking a beer on 7/8/19. Patient was requested to complete a UA on 7/5/19, results indicated that it was diluted. Patient was requested to complete another UA on 7/8/19, which was negative for all tested substances, including patient's prescribed benzodiazepines. These results were reviewed with patient, patient presented current medication, reported he has been taking it, and also reports that he has been drinking large amounts of fluids, with restricted eating during the day, which may be effective results. Patient was requested to complete a UA on 8/5/19 which was positive for benzodiazepines, which the patient is currently prescribed. Patient does not report or display any withdrawal symptoms at this time.   Specific goals from treatment plan addressed this week:  Patient contacted counselor on 10/16/19 to report he had used 1.5 bottles of cough syrup over the weekend, reported no current withdrawal symptoms. Patient was not requested to complete a UA this week.   Effectiveness of strategies:  Staff to continue to monitor.   Dimension #2 - Biomedical - Risk 0. Patient reports no current health concerns, but does report restricting his eating. Patient has medical insurance and is able to access medical care as needed.   Specific goals from treatment plan addressed this week:  Patient reported no changes to physical health this week. Patient reported no changes to eating habits. Patient reports continued tobacco product use.   Effectiveness of strategies:  Strategies appear effective.   Dimension #3 - Emotional/Behavioral/Cognitive - Risk 2. Patient reports mental health diagnoses of major depressive disorder, generalized anxiety disorder, and borderline personality disorder. Patient reports that he does currently have psychiatry services that he regularly attends (weekly currently).  Patient endorses current mental health medications. Patient currently sees a therapist. Patient was recently discharged  from a stay on a mental health unit due to suicidal ideation. Patient has a long history of struggling with suicidal ideation and attempts. Patient endorses active struggles with food restriction, as well as self-harm. Patient states that he greatly struggles with impulse control at this time, and concern of engaging more in self harm when not drinking. Patient is oriented x4.   Active interventions to stabilize mental health symptoms:  Patient reports taking medications as prescribed, attending weekly therapy. Patient reported attending DBT program through [facility]. Counselor checking in with patient at each appointment regarding SIB and SI, patient continues to be in agreement to follow safety plan.   Specific goals from treatment plan addressed this week:  Patient reported that he will be starting individual therapy for ED on 10/17/19. Patient reported almost 72 hours without self-harm this week, continuing to work on goal to reduce frequency.   Effectiveness of strategies:  Strategies appear effective, staff to continue to monitor.   Dimension #4 - Readiness for Change - Risk 0. Patient verbalizes that he feels that he is here on his own regard, despite being recommended by his psychiatrist. Patient does verbalize that he believes his use is problematic, and that he wants help to change at this time. Patient verbalizes willingness to follow all recommendations made for him while in treatment. Patient appears genuinely motivated for treatment and change at this time.  Specific goals from treatment plan addressed this week:  Patient attended 1/1 groups this week, attended 1/1 individual appointments. Patient was active during group. Patient reported accomplishing goal of reducing the days he self-harmed by at least 1, continuing to work on this goal.   Effectiveness of strategies:  Strategies appear effective.   Dimension #5 - Relapse Potential - Risk 2. Patient reports regular use of alcohol up until 6/5/19, and so he  therefore may currently lack the necessary coping skills to help him prevent relapse at this time. Patient reports current abstinence since 7/8/19. He indicated that he struggles with impulse control which could indicate an increased risk of relapse at this time. Patient does report some past treatment experience and a history of periods of sustained sobriety. He therefore may have some knowledge of the skills needed to maintain sobriety, however it does not appear that he has been utilizing those skills in the past 6 months. Patient's lack of current skills, mental health, and impulse control issues appear to indicate an increased risk of relapse at this time. Patient has had 2 relapses in outpatient treatment.   Specific goals from treatment plan addressed this week:  Patient reported to counselor that he had used cough syrup over the weekend, reported difficult emotions related to his birthday were a trigger. Patient reported ongoing urges for marijuana.   Effectiveness of strategies:  Strategies appear effective, staff to continue to monitor.  Dimension #6 - Recovery Environment - Risk 2. Patient currently works full-time, and do he does appear to have some structure for his daily life. Patient struggled to identify sober hobbies, so he does appear to lack meaningful activity outside of work at this time. Patient states that he currently has a stable place to live, however it is not always the most supportive as he sometimes isolates. Patient indicated peer support for sobriety, and that he has been attending support groups, so he does have some support for his sobriety. He states that his family does not know about his use currently, and that they would not be supportive of him if they were made aware. Patient denies any current legal involvement.   Specific goals from treatment plan addressed this week:  Patient reported frustration with work, identified needing to practice acceptance or consider other  options. Patient reports no recent recovery group participation, but did report social interaction over the weekend.   Effectiveness of strategies:  Strategies appear effective.   T) Treatment plan updated: No.  Patient notified and in agreement: NA  Patient educated on Relapse Prevention. Patient has completed 148 of 120 program hours at this time, patient is currently in phase III, counselor and patient in agreement to extend treatment. Discharge plan is resources in the community/mental health services.     EVAN Blunt, [facility]  10/18/2019, 3:42 PM       Weekly Educational Topics Date   1. Dual Diagnoses, week 1 8/26, 8/28, 8/30   2. Dual Diagnoses, week 2 9/4, 9/6   3. Stress Management 8/12, 8/16 8/19, 8/23   4. Feelings/Emotions 9/9, 9/11, 9/13   5. Thinking 6/14   6. Change 6/17, 6/19, 6/21   7. Recovery Support 6/24, 6/26, 6/28   8. Relationships/Communication 7/1, 7/3, 7/5   9. Addiction 101 7/8, 7/10, 7/12   10. Relapse Prevention 7/15, 7/17, 7/19   11. Grief and Loss 7/22, 7/24, 7/26   12. Strengths 7/29, 7/31, 8/2   13. Wellness 8/5, 8/7, 8/9   Mindfulness  6/14, 6/21, 6/28, 7/5, 7/12, 7/19, 7/26, 8/2, 8/9, 8/16, 8/30, 9/6, 9/13     Weekly Educational Topics Date   Boundaries    Suicidal ideation 9/24   Acceptance 10/1    Relationships 10/8    Relapse Prevention 10/15

## 2021-06-02 NOTE — PROGRESS NOTES
Office Visit   Cong Tuttle   28 y.o. male    Date of Visit: 10/3/2019    Chief Complaint   Patient presents with     Annual Exam     Pt is fasting        Assessment and Plan   1. Routine general medical examination at a health care facility  His examination is satisfactory today.  He is fasting and I will do screening lipids, hepatitis C and HIV screening.  He is up-to-date on vaccinations.  He has now been sober for 3 months.  He does continue to smoke.  Recommend annual physicals.  - HIV Antigen/Antibody Screening [facility]  - Hepatitis C Antibody (Anti-HCV)  - Lipid [facility]    2. Dysuria  With his urinary changes I will get a urine analysis as well as recheck his electrolytes and CBC.  Could be due to medication as well as his eating disorder.  Will get back to him with his test results.  - HM2(CBC w/o Differential)  - Thyroid Cascade  - Urinalysis-UC if Indicated    3. Atypical eating disorder  He is scheduled to be evaluated tomorrow in the [facility].  - HM2(CBC w/o Differential)  - Thyroid Eddy  - Comprehensive Metabolic Panel    4. Borderline personality disorder (H)  He is in the midst of intensive therapy as well as seeing a psychiatrist.    5. Moderate episode of recurrent major depressive disorder (H)  He will continue with the psychiatry.       Return in about 1 year (around 10/3/2020) for Annual physical.     History of Present Illness   This 28 y.o. old comes in to establish care and have a physical.  He has a history of substance abuse, severe depression, anxiety, and borderline personality disorder.  He is currently in intensive therapy and sees a psychiatrist.  He is on multiple medications.  He has no other chronic medical problems.  No history of asthma.  He has been diagnosed now with an eating disorder as well and is scheduled to have an assessment with the [facility] tomorrow.  He is fasting today.  He is on Abilify and will plan to do a fasting lipid panel.  He does not think he  "has had HIV or hepatitis C screening in the past.  His only concern is that he feels his urine smells badly.  States his diet is not good and he is on multiple medications but he is concerned about possible infection as well.    Review of Systems: As above, systems otherwise reviewed and negative.     Medications, Allergies and Problem List   Patient Active Problem List   Diagnosis     Recurrent major depression in partial remission (H)     Alcohol use disorder, severe, in sustained remission, dependence (H)     Atypical eating disorder     Cannabis use disorder, severe, in sustained remission (H)     Generalized anxiety disorder     Moderate episode of recurrent major depressive disorder (H)     Borderline personality disorder (H)     Current Outpatient Medications   Medication Sig Dispense Refill     ARIPiprazole (ABILIFY) 2 MG tablet Take 2 mg by mouth.       b complex vitamins tablet Take 1 tablet by mouth daily.       cholecalciferol, vitamin D3, (VITAMIN D3 ORAL) Take by mouth daily.       clonazePAM (KLONOPIN) 1 MG tablet Take 2 mg by mouth 2 (two) times a day. 2 mg morning and 3 mg hs             diphenhydrAMINE (BENADRYL) 25 mg tablet Take 25 mg by mouth at bedtime as needed for sleep.       disulfiram (ANTABUSE) 250 mg tablet Take 250 mg by mouth daily.       docosahexanoic acid/epa (FISH OIL ORAL) Take by mouth daily.       LORazepam (ATIVAN) 0.5 MG tablet TAKE ONE TABLET BY MOUTH DAILY IF NEEDED FOR ANXIETY.  3     multivitamin therapeutic tablet Take 1 tablet by mouth daily.       naltrexone (DEPADE) 50 mg tablet Take 50 mg by mouth daily.       venlafaxine (EFFEXOR-XR) 150 MG 24 hr capsule Take 150 mg by mouth daily.       zolpidem (AMBIEN CR) 12.5 MG CR tablet Take 12.5 mg by mouth.       No current facility-administered medications for this visit.      No Known Allergies       Physical Exam     /80 (Patient Site: Right Arm, Patient Position: Sitting)   Pulse 62   Ht 5' 9\" (1.753 m)   Wt 152 " lb (68.9 kg)   SpO2 99%   BMI 22.45 kg/m      General:  Patient is alert and in no apparent distress.  Neck:  Supple with no adenopathy or thyroid abnormality noted.  Cardiovascular:  Regular rate and rhythm, normal S1/S2, no murmurs, rubs, or gallop.  Pulmonary:  Lungs are clear to auscultation bilaterally with normal respiratory effort.  Gastrointestinal:  Abdomen is soft, non-tender, non-distended, with no organomegaly, rebound or guarding.  Extremities:  No joint abnormalities with no LE edema.  Strong dorsalis pedis pulses.  Neurologic Cranial nerves are intact.  No focal deficits.  Psychiatric:  Pleasant, no confusion or agitation.  Does appear to be anxious.  Skin: He has numerous cuts and scars on his arms and abdomen.         Additional Information   Social History     Tobacco Use     Smoking status: Current Every Day Smoker     Packs/day: 0.00     Smokeless tobacco: Current User   Substance Use Topics     Alcohol use: Not Currently     Drug use: Not Currently            Tesha Rodriguez MD

## 2021-06-02 NOTE — PROGRESS NOTES
WHITNEY. Counselor met with patient for 60 minutes to discuss treatment progress. A. Counselor inquired about the [facility] referral, discussed potential benefits and challenges to following recommendation for inpatient treatment. Counselor inquired about patient's DBT program, encouraged patient to discussed recent SI with DBT program staff. Counselor validate patient's frustration with work and challenged patient to consider what is most important to him at this point and consider what options that that he has. Counselor assessed for safety, and patient's ability and willingness to follow safety plan. R. Patient reported that he is still feeling ambivalent about the ED program, reports will be starting with ED therapist next week. Patient reported that he hasn't discussed the SI with the DBT staff, but reported plan to do so on 10/10/19. Patient reported that he is having a hard time sort out how to do all the programs and work, and discussed his frustrations with his job. Patient reported no current suicidal intent, plan or means, reported willingness to follow safety plan, appeared future oriented. T. Patient to continue with phase III.      Patient treatment was reviewed. Changes  were not made. Patient was actively and directly involved in the treatment plan review and updates.     EVAN Blunt, [facility]  10/8/2019, 4:18 PM

## 2021-06-03 VITALS
BODY MASS INDEX: 22.51 KG/M2 | DIASTOLIC BLOOD PRESSURE: 80 MMHG | HEIGHT: 69 IN | WEIGHT: 152 LBS | HEART RATE: 62 BPM | OXYGEN SATURATION: 99 % | SYSTOLIC BLOOD PRESSURE: 120 MMHG

## 2021-06-03 VITALS
HEIGHT: 69 IN | OXYGEN SATURATION: 99 % | BODY MASS INDEX: 22.51 KG/M2 | SYSTOLIC BLOOD PRESSURE: 122 MMHG | WEIGHT: 152 LBS | DIASTOLIC BLOOD PRESSURE: 84 MMHG | HEART RATE: 71 BPM

## 2021-06-03 NOTE — PROGRESS NOTES
OUTPATIENT SUBSTANCE USE DISORDER TREATMENT  DISCHARGE SUMMARY      Name:  Cong Tuttle   :  EVAN Blunt LADC   Admit Date: 19   Discharge Date: 19   :  1990   Hours Completed: 154    Initial Diagnosis:  Alcohol Use Disorder, severe (F10.20)   Final Diagnosis:  Alcohol Use Disorder, severe (F10.20)   Discharge Address:    [address] [place] [id number]   Funding Source:    [employer]     Discharge Type:  Referral to higher level of care.     Client was receiving residential services at the time of discharge:   No      Reasons for and circumstances of service termination:  Patient completed intake on 19 and started co-occurring outpatient. Patient made progress in primary group and was referred to phase III as of 19 at which time patient was also referred to the DBT program at [facility]. This DBT program then referred patient to be assessed for eating disorders at the [facility]. Patient completed this assessment and was referred to inpatient treatment. Patient initially reported that he was unable to follow this referral and would be doing outpatient ED treatment, but following increased mental health symptoms that resulted in short-term disability from work, patient agreed to intake to inpatient ED program. Patient reported intake to ED program at the [facility] as of 19, and as such is discharged from outpatient [facility] treatment as of 19.        If program discharge status was At Staff Request, the license [place] must identify the following:    Other interested parties conferred with: not applicable    Referrals provided: not applicable    Alternatives considered and attempted before deciding to discharge:  not applicable      Dimension/Course of Treatment/Individualized Care:   1.  Withdrawal Potential - Intake Risk level -  0 Discharge Risk level - 0  Narrative supporting risk description:  Patient reports last use of  alcohol on 6/5/19 at intake, most recent pattern of use has been 3-9 drinks 2-4x per week. Patient was not requested to complete a UA on 6/19/19 which was positive for benzodiazepines, patient is currently prescribed klonopin, did not indicate recent alcohol use. Patient reported having two glasses of wine on 6/21/19. Patient reported drinking a beer on 7/8/19. Patient was requested to complete a UA on 7/5/19, results indicated that it was diluted. Patient was requested to complete another UA on 7/8/19, which was negative for all tested substances, including patient's prescribed benzodiazepines. These results were reviewed with patient, patient presented current medication, reported he has been taking it, and also reports that he has been drinking large amounts of fluids, with restricted eating during the day, which may be effective results. Patient was requested to complete a UA on 8/5/19 which was positive for benzodiazepines, which the patient is currently prescribed. Patient contacted counselor on 10/16/19 to report he had used 1.5 bottles of cough syrup over the weekend. Patient reported using alcohol and marijuana on 10/25/19. Patient does not report or display any withdrawal symptoms at this time.   Treatment plan goals and progress towards those goals:  Patient had 4 reported relapses while in outpatient treatment. Patient completed all requested UAs while in treatment.      2.  Biomedical Conditions and Complications - Intake Risk level -  0 Discharge Risk level - 0  Narrative supporting risk description:  Patient reports no current health concerns, but does report restricting his eating. Patient has medical insurance and is able to access medical care as needed.   Treatment plan goals and progress towards those goals:  Patient established care with a primary care physician while in treatment and reported attending scheduled appointments. Patient reported some attempts to reduce tobacco use in treatment.  Patient was seen by medical staff on 10/28/19 due to lacerations, reported following up with his primary care physician.       3.  Emotional/Behavioral/Cognitive Conditions and Complications - Intake Risk level -  2 Discharge Risk level - 2  Narrative supporting risk description:  Patient reports mental health diagnoses of major depressive disorder, generalized anxiety disorder, and borderline personality disorder. Patient reports that he does currently have psychiatry services that he regularly attends (weekly currently).  Patient endorses current mental health medications. Patient currently sees a therapist. Patient was recently discharged from a stay on a mental health unit due to suicidal ideation. Patient reported suicide attempt on 8/25/19 via strangulation. Patient has a long history of struggling with suicidal ideation and attempts. Patient endorses active struggles with food restriction, as well as self-harm. Patient states that he greatly struggles with impulse control at this time, and concern of engaging more in self harm when not drinking.   Treatment plan goals and progress towards those goals:  Patient reported attending all mental health appointments for psychiatry, individual psychotherapy and DBT. Patient reported taking medications as prescribed. Patient reported continued self-harm throughout treatment which resulted in referral to DBT programming. Patient received medical care for self-harm on 10/28/19. Patient and counselor developed safety plan initially after the patient started treatment and was updated following suicide attempt on 8/25/19. Counselor continued to assess for SI at every appointment as well as patient's ability and willingness to follow safety plan. Patient reported some use of coping skills, particularly urge surfing, mindfulness skills and reaching out for support. Patient reported continued restriction related to eating, and at discharge reported entering inpatient ED  treatment at the [facility].      4.  Readiness for Change - Intake Risk level -  0 Discharge Risk level - 0  Narrative supporting risk description:  Patient verbalizes that he feels that he is here on his own regard, despite being recommended by his psychiatrist. Patient does verbalize that he believes his use is problematic, and that he wants help to change at this time. Patient verbalizes willingness to follow all recommendations made for him while in treatment. Patient appears genuinely motivated for treatment and change at this time. Patient was calm, cooperative, and appropriately behaved throughout this appointment.   Treatment plan goals and progress towards those goals:  Patient attended all scheduled appointments, with 1 excused absence through the whole duration of treatment. Patient at time of discharge appears motivated to address co-occurring mental health and ED symptoms.      5.  Relapse/Continued Use/Continued Problem Potential - Intake Risk level -  3 Discharge Risk level - 3  Narrative supporting risk description:  Patient reports regular use of alcohol up until 6/5/19, and so he therefore may currently lack the necessary coping skills to help him prevent relapse at this time. Patient reports current abstinence since 10/25/19 He indicated that he struggles with impulse control which could indicate an increased risk of relapse at this time. Patient does report some past treatment experience and a history of periods of sustained sobriety. Patient's lack of current skills, mental health, and impulse control issues appear to indicate an increased risk of relapse at this time. Patient has had 4 relapses in outpatient treatment.   Treatment plan goals and progress towards those goals:  Patient experience two relapses early in treatment, but appeared to use coping skills to set boundaries with triggers to use, particularly being around his parents while they were drinking or avoiding places associated  with use. Patient reported an increase in urges to use marijuana in the last few weeks of treatment with a relapse of alcohol and marijuana on 10/25/19. Patient's mental health symptoms appear to be barrier to long-term recovery, which will hopefully be address in the patient's ED treatment.      6.  Recovery Environment - Intake Risk level -  2 Discharge Risk level - 3  Narrative supporting risk description:  Patient currently works full-time, and do he does appear to have some structure for his daily life. Patient struggled to identify sober hobbies, so he does appear to lack meaningful activity outside of work at this time. Patient states that he currently has a stable place to live, however it is not always the most supportive as he sometimes isolates. Patient indicated peer support for sobriety, and that he has been attending support groups, so he does have some support for his sobriety. Patient denies any current legal involvement.   Treatment plan goals and progress towards those goals:  Patient initially reported weekly attendance at recovery meetings, but reported discontinuing attendance. Patient appears to struggle with boundaries in his relationships, particularly in new relationships and with his parents. Patient and counselor met with patient's parents for a family conference, and patient reported increased support from his parents following this meeting. Patient frequently indicated a desire to do more outside of work, but reported difficulty following through on these things. At time of discharge, patient reported that he was on short-term disability from this work as of 10/28/19 and planned to attend inpatient ED treatment at that time.      Strengths: motivated, kind, intelligent  Needs: structure, support, implementing coping skills to address mental health symptoms.   Services Provided: Intake, assessment, treatment planning, education, group discussion, film, lectures, 1x1 therapy, and  recommendations at discharge.        Program Involvement: Excellent  Attendance: Excellent  Ability to relate in group/   Other program activities: Good  Assignment Completion: Fair  Overall Behavior: Excellent  Reported Family/Significant   Other Involvement: Good    Prognosis: Fair      Recommendations       Patient is recommended to follow plan to complete inpatient ED treatment and follow all recommendations.    Mental Health Referral  Patient is recommended to continue with current mental health services following inpatient ED treatment, including DBT program.     Physical Health Referral:    Patient is recommended to his primary care physician.     Counselor Name and Title:  EVAN Blunt, [facility]        Date:  11/5/2019  Time:  4:29 PM

## 2021-06-03 NOTE — TELEPHONE ENCOUNTER
Who is requesting the letter?  Patient.  Why is the letter needed? Needs an emotional support animal letter with documentation of why patient needs it as he is in the [facility].  States needs done today.  How would you like this letter returned? Fax to [phone] Attn:   Okay to leave a detailed message? Yes, patient requesting a call back when letter sent.

## 2021-06-03 NOTE — TELEPHONE ENCOUNTER
Patient will be doing inpatient treatment at [facility]- needs a letter to allow him to bring his dog with him as an emotional support animal    Ok for this?

## 2021-06-03 NOTE — PROGRESS NOTES
Patient currently is in residential programming at the [facility]. If he is to finish the program before this therapist return from leave he will meet with VALENCIA Blunt, ATIF weekly until this therapist returns.

## 2021-06-04 VITALS
OXYGEN SATURATION: 98 % | HEIGHT: 69 IN | HEART RATE: 82 BPM | SYSTOLIC BLOOD PRESSURE: 112 MMHG | WEIGHT: 186 LBS | BODY MASS INDEX: 27.55 KG/M2 | DIASTOLIC BLOOD PRESSURE: 82 MMHG

## 2021-06-04 NOTE — TELEPHONE ENCOUNTER
returned patient's call, patient reported interest in continuing therapy with this therapist while in ED programing. Therapist requested patient discuss with program if this would be an option.     EVAN Blunt, [facility]  12/5/2019, 2:14 PM

## 2021-06-04 NOTE — TELEPHONE ENCOUNTER
This therapist consulted with patient's current therapist at the [facility] (patient's therapist at [facility] faxed MALENA). This therapist discussed previous work with patient in MI/CD program and plan to do individual therapy while patient's primary therapist was on medical leave. Patient's therapist at [facility] discussed relapses with alcohol, and that current recommendation is that a patient move to a sober house, and potential referral to [facility] DBT/co-occurring program.     EVAN Blunt, [facility]  12/19/2019, 3:38 PM

## 2021-06-05 NOTE — PROGRESS NOTES
Mental Health Visit Note    This is a dual signature report.  My supervising clinician is Estella Coker PsyD, GILLIANP, BUD.    Date: 2/4/20   Start time: 3:32  Stop Time: 4:20   Session # 2    Session Type: Patient is presenting for an Individual session.    Cong Tuttle is a 29 y.o. male is being seen today for    Chief Complaint   Patient presents with      Follow Up     Present For Session: Patient and therapist     New symptoms or complaints: None    Functional Impairment:   Personal: 3  Family: 3  Work: on leave  Social:3  No change on 2/4/20    Clinical assessment of mental status: Mr. Tuttle presented on time for scheduled session today.  He was open and cooperative, and dressed appropriately for this session and weather. His memory was good for both short and long term.  His speech and language was soft and concise throughout the session.  Concentration and focus is within normal limits. Psychosis is not noted or reported. He reports his mood is depressed.  Affect is congruent with speech.  Fund of knowledge is adequate. Insight is adequate for therapy. Reviewed and changes made as needed.     Suicidal/Homicidal Ideation present: Patient denied current suicidal ideations, plans and/or means. Patient agreed to call 911 and/or report to ER if suicidal ideations were to occur. Patient also agreed to follow suicide safety plan. No change 2/4/20    Patient's impression of their current status: Patient presented to appointment today and reported that he is currently on the wait list for [facility] program, but it may take up to 30 days for him to start. Patient reported that currently he has little structure and that he feels overwhelmed and unmotivated at the same time. Patient was able to identify that his current situation is temporary. Patient discussed that he is attempting to balance what he wants with his parents expectations.     Therapist impression of patients current state: Patient appears to have fair  insight into his mental health. This therapist reframed patient's current lack of structure, discussing how it is likely that the patient will be starting day treatment soon and if he was working currently he wouldn't have the free-time he currently has. This therapist suggested creating a structure by scheduling things to do during the day and then following this structure. This therapist encouraged patient to more aware of what his own values and goals are and accepting that these will not always align with his parents.     Type of psychotherapeutic technique provided: Insight oriented, Client centered, Solution-focused and CBT    Progress toward short term goals: Progress as expected, patient continuing to work on challenging his thinking, using CBT skills.     Review of long term goals: not reviewed this session, next reviewed on 4/21/20    Diagnosis:   1. Major depressive disorder, recurrent episode, moderate (H)    2. JOSSIE (generalized anxiety disorder)    3. Moderate alcohol use disorder (H)      Plan and Follow up: Patient to return for weekly therapy with this therapist until patient's therapist, SYLVIA Albarado returns from medical leave.  Patient agreed to call 911 and/or report to ER if any suicidal ideations were to occur.     Discharge Criteria/Planning: Patient will continue with follow-up until therapy can be discontinued without return of signs and symptoms.    Performed and documented by: Elinor Pandya, EVAN, [facility]  Supervisor: Dr. Estella Coker PsyD, [facility], LP  Date:  2/6/2020  Time:  10:52 AM

## 2021-06-06 NOTE — PROGRESS NOTES
This therapist attempted to call patient to inform him that his appointment tomorrow is moved to teletherapy. Patient's mailbox was full this therapist will attempt to call him again.

## 2021-06-06 NOTE — PROGRESS NOTES
Office Visit   Cong Tuttle   29 y.o. male    Date of Visit: 2/25/2020    Chief Complaint   Patient presents with     Follow-up     med check and disability paperwork        Assessment and Plan   1. Recurrent major depression in partial remission (H)  He has a new psychiatrist and is on multiple medications.  He seems to be doing better from this perspective.  He currently is not working and has follow-up next week with his psychiatrist.  He has been working with a  on getting disability due to his mental health.  He needs a note stating that his eating disorder is new and that I did not treat him for this prior to my first visit with him in October.  I have given him a letter today.    2. Generalized anxiety disorder  This seems to be improved with his current medications.  He will continue with psychiatric follow-up.    3. Atypical eating disorder  He went through [facility].  He now has outpatient treatment through that program.  He has been able to gain weight and we did discuss that his weight is satisfactory at this point.    4. Tobacco abuse  - nicotine (NICODERM CQ) 21 mg/24 hr; Place 1 patch on the skin daily.  Dispense: 30 patch; Refill: 1  - nicotine polacrilex (NICORETTE) 4 MG gum; Apply 1 each (4 mg total) to the mouth or throat as needed for smoking cessation.  Dispense: 100 each; Refill: 2       No follow-ups on file.     History of Present Illness   This 29 y.o. old male comes in to follow-up.  He has a history of significant anxiety and depression as well as chemical dependency.  He has been sober for months now.  He recently went through the [facility] due to an eating disorder.  He has a new psychiatrist here in the [facility] who he has been working with and has a follow-up next week.  He is comfortable with his current medications.  His mood seems better than it has previously.  He also has been able to gain weight and will continue with the [facility].  He  currently is working toward getting disability and does need a note stating that I did not treat him for an eating disorder prior to October when I first met him.  He has no other acute concerns today.    Review of Systems: As above, systems otherwise reviewed and negative.     Medications, Allergies and Problem List   Patient Active Problem List   Diagnosis     Recurrent major depression in partial remission (H)     Alcohol use disorder, severe, in sustained remission, dependence (H)     Atypical eating disorder     Cannabis use disorder, severe, in sustained remission (H)     Generalized anxiety disorder     Moderate episode of recurrent major depressive disorder (H)     Borderline personality disorder (H)     Current Outpatient Medications   Medication Sig Dispense Refill     ARIPiprazole (ABILIFY) 10 MG tablet Take 10 mg by mouth.       b complex vitamins tablet Take 1 tablet by mouth daily.       busPIRone (BUSPAR) 10 MG tablet Take 20 mg by mouth 2 (two) times a day.        cholecalciferol, vitamin D3, (VITAMIN D3 ORAL) Take by mouth daily.       clonazePAM (KLONOPIN) 1 MG tablet Take 1 and 1/2 tablets twice daily and two tablets at bedtime.       diphenhydrAMINE (BENADRYL) 25 mg tablet Take 25 mg by mouth at bedtime as needed for sleep.       disulfiram (ANTABUSE) 250 mg tablet Take 250 mg by mouth daily.       fish oil-omega-3 fatty acids 300-1,000 mg capsule Take 1 g by mouth daily.       Lactobacillus rhamnosus GG (CULTURELLE) 15 billion cell CpSP Take 1 capsule by mouth daily.       LORazepam (ATIVAN) 0.5 MG tablet Take 1 mg by mouth every 8 (eight) hours as needed for anxiety.   3     multivitamin therapeutic tablet Take 1 tablet by mouth daily.       naltrexone (DEPADE) 50 mg tablet Take 100 mg by mouth daily.        venlafaxine (EFFEXOR-XR) 150 MG 24 hr capsule Take 150 mg by mouth daily.       zolpidem (AMBIEN CR) 12.5 MG CR tablet TAKE ONE TABLET BY MOUTH DAILY EVERY NIGHT. DO NOT CRUSH, CHEW, OR  "SPLIT.  0     nicotine (NICODERM CQ) 21 mg/24 hr Place 1 patch on the skin daily. 30 patch 1     nicotine polacrilex (NICORETTE) 4 MG gum Apply 1 each (4 mg total) to the mouth or throat as needed for smoking cessation. 100 each 2     No current facility-administered medications for this visit.      No Known Allergies       Physical Exam     /82 (Patient Site: Left Arm, Patient Position: Sitting)   Pulse 82   Ht 5' 9\" (1.753 m)   Wt 186 lb (84.4 kg)   SpO2 98%   BMI 27.47 kg/m      General: This is an alert male in no apparent distress.     Additional Information   Social History     Tobacco Use     Smoking status: Current Every Day Smoker     Packs/day: 0.00     Smokeless tobacco: Current User     Types: Chew     Tobacco comment: e cig   Substance Use Topics     Alcohol use: Not Currently     Drug use: Not Currently          Tesha Rodriguez MD    "

## 2021-06-06 NOTE — PROGRESS NOTES
This therapist attempted to call patient at 8:00 and 8:26 for teletherapy. Patient did not answer and Vm was full.

## 2021-06-07 NOTE — TELEPHONE ENCOUNTER
Refill Request  Did you contact pharmacy: No  Medication name:   Requested Prescriptions     Pending Prescriptions Disp Refills     venlafaxine (EFFEXOR-XR) 150 MG 24 hr capsule   0     Sig: Take 1 capsule (150 mg total) by mouth daily.     busPIRone (BUSPAR) 10 MG tablet   0     Sig: Take 2 tablets (20 mg total) by mouth 2 (two) times a day.     disulfiram (ANTABUSE) 250 mg tablet   0     Sig: Take 1 tablet (250 mg total) by mouth daily.     ARIPiprazole (ABILIFY) 10 MG tablet   0     Sig: Take 1 tablet (10 mg total) by mouth.     Who prescribed the medication: The patient states Catherine Lombardo,MD Psychiatrist.   [facility]ed [facility]acy[facility]  Is patient out of medication: Yes  Patient notified refills processed in 3 business days:  yes  Okay to leave a detailed message: yes   The patient states he currently is uninsured and pays out of pocket for his medications and no longer has a psychiatrist.   The patient would like a call when the prescriptions refilled.This writer informed the patient alerts can be set up through the pharmacy.

## 2021-06-07 NOTE — TELEPHONE ENCOUNTER
RN cannot approve Refill Request    RN can NOT refill this medication historical medication requested.       Melisa Cornejo, [facility] Triage/Med Refill 4/21/2020    Requested Prescriptions   Pending Prescriptions Disp Refills     venlafaxine (EFFEXOR-XR) 150 MG 24 hr capsule  0     Sig: Take 1 capsule (150 mg total) by mouth daily.       Venlafaxine/Desvenlafaxine Refill Protocol Passed - 4/21/2020 10:01 AM        Passed - LFT or AST or ALT in last year     Albumin   Date Value Ref Range Status   10/03/2019 3.9 3.5 - 5.0 g/dL Final     Bilirubin, Total   Date Value Ref Range Status   10/03/2019 0.9 0.0 - 1.0 mg/dL Final     Alkaline Phosphatase   Date Value Ref Range Status   10/03/2019 112 45 - 120 U/L Final     AST   Date Value Ref Range Status   10/03/2019 28 0 - 40 U/L Final     ALT   Date Value Ref Range Status   10/03/2019 30 0 - 45 U/L Final     Protein, Total   Date Value Ref Range Status   10/03/2019 7.3 6.0 - 8.0 g/dL Final                Passed - Fasting lipid cascade in last year     Cholesterol   Date Value Ref Range Status   10/03/2019 157 <=199 mg/dL Final     Triglycerides   Date Value Ref Range Status   10/03/2019 55 <=149 mg/dL Final     HDL Cholesterol   Date Value Ref Range Status   10/03/2019 77 >=40 mg/dL Final     LDL Calculated   Date Value Ref Range Status   10/03/2019 69 <=129 mg/dL Final     Patient Fasting > 8hrs?   Date Value Ref Range Status   10/03/2019 Yes  Final             Passed - PCP or prescribing provider visit in last year     Last office visit with prescriber/PCP: 2/25/2020 Tesha Rodriguez MD OR same dept: 2/25/2020 Tesha Rodriguez MD OR same specialty: 2/25/2020 Tesha Rodriguez MD  Last physical: 10/3/2019 Last MTM visit: Visit date not found   Next visit within 3 mo: Visit date not found  Next physical within 3 mo: Visit date not found  Prescriber OR PCP: Tesha Rodriguez MD  Last diagnosis associated with med order: There are no diagnoses linked to this  encounter.  If protocol passes may refill for 12 months if within 3 months of last provider visit (or a total of 15 months).             Passed - Blood Pressure in last year     BP Readings from Last 1 Encounters:   02/25/20 112/82                busPIRone (BUSPAR) 10 MG tablet  0     Sig: Take 2 tablets (20 mg total) by mouth 2 (two) times a day.       Tricyclics/Misc Antidepressant/Antianxiety Meds Refill Protocol Passed - 4/21/2020 10:01 AM        Passed - PCP or prescribing provider visit in last year     Last office visit with prescriber/PCP: 2/25/2020 Tesha Rodriguez MD OR same dept: 2/25/2020 Tesha Rodriguez MD OR same specialty: 2/25/2020 Tesha Rodriguez MD  Last physical: 10/3/2019 Last MTM visit: Visit date not found   Next visit within 3 mo: Visit date not found  Next physical within 3 mo: Visit date not found  Prescriber OR PCP: Tesha Rodriguez MD  Last diagnosis associated with med order: There are no diagnoses linked to this encounter.  If protocol passes may refill for 12 months if within 3 months of last provider visit (or a total of 15 months).                disulfiram (ANTABUSE) 250 mg tablet  0     Sig: Take 1 tablet (250 mg total) by mouth daily.       There is no refill protocol information for this order        ARIPiprazole (ABILIFY) 10 MG tablet  0     Sig: Take 1 tablet (10 mg total) by mouth.       There is no refill protocol information for this order

## 2021-06-07 NOTE — TELEPHONE ENCOUNTER
I just forwarded the refills back saying they need to go to his psychiatrist.  His psychiatrist should have someone there that can at least cover her med refills in the meantime.  The antabuse is something I'm a little uncomfortable with.  Does he have a CD specialist?  Otherwise, please send back the refills for 1 month and then I'll order a psychiatry consult.  Also, can we set him up for care coordination?  Thanks.

## 2021-06-07 NOTE — TELEPHONE ENCOUNTER
Kimberlee was in touch with him (different note) and he was contacting his psychiatrist's office to get these.  If he already called them and they cannot fill, then let me know.  Otherwise, I deleted these.  Thanks.

## 2021-06-07 NOTE — TELEPHONE ENCOUNTER
RECEIVED REQUEST FOR MEDICAL RECORDS AND REQUEST TO COMPLETE BEHAVIORAL HEALTH QUESTIONNAIRE.  FAXED REQUEST FOR MEDICAL RECORD TO MEDICAL RECORD DEPARTMENT.  PLACED QUESTIONNAIRE IN PROVIDER'S CLINIC MAILBOX    4/28/2020 SECOND REQUEST RECEIVED

## 2021-06-07 NOTE — PROGRESS NOTES
Clinic Care Coordination Contact  [facility]/Voicemail       Clinical Data: Care Coordinator Outreach  Outreach attempted x 1.  Left message on patient's voicemail with call back information and requested return call.  Plan:  Care Coordinator will try to reach patient again in 1-2 business days.

## 2021-06-07 NOTE — TELEPHONE ENCOUNTER
Controlled Substance Refill Request  Medication Name:   Requested Prescriptions     Pending Prescriptions Disp Refills     zolpidem (AMBIEN CR) 12.5 MG CR tablet   0     LORazepam (ATIVAN) 0.5 MG tablet   3     Sig: Take 2 tablets (1 mg total) by mouth every 8 (eight) hours as needed for anxiety.     naltrexone (DEPADE) 50 mg tablet   0     Sig: Take 2 tablets (100 mg total) by mouth daily.     clonazePAM (KLONOPIN) 1 MG tablet   0     Date Last Fill: 2/25/20  Is patient out of medication?:  Yes  Patient notified refills processed within 3 business days:  Yes  [facility]ed [facility]  Submit electronically to pharmacy  Controlled Substance Agreement on file:   Encounter-Level CSA Scan Date:    There are no encounter-level csa scan date.        Last office visit:  2/25/20   The patient states he no longer has a psychiatrist. .The patient states his psychiatrist retired and hasn't found another psychiatrist. The patient states he does not have insurance currently, he pays pout of pocket for visits and the medications. The patient is asking to get a referral to another psychiatrist. The patient would like a phone call when the prescriptions have been sent to the pharmacy. This writer advised the patient to set up alerts with the pharmacy as well. The patient also would like a references for assistance with paying for the medications during this time of no insurance coverage. Please call the patient.

## 2021-06-07 NOTE — PROGRESS NOTES
Clinic Care Coordination Contact  [facility]/Voicemail       Clinical Data: Care Coordinator Outreach  Outreach attempted x 2.  Left message on patient's voicemail with call back information and requested return call.  Plan: Care Coordinator will send care coordination introduction letter with care coordinator contact information and explanation of care coordination services via mail. Care Coordinator will do no further outreaches at this time.

## 2021-06-07 NOTE — TELEPHONE ENCOUNTER
Dr. Rodriguez,  Spoke with the patient and relayed message below.  He verbalized understanding and will contact his psychiatry office for the refills and referral request.  Referral to care coordinators has been set up for you to review.  Kimberlee RODRIGUEZ CMA/CORRINE....................11:14 AM

## 2021-06-07 NOTE — PROGRESS NOTES
Clinic Care Coordination Contact  Community Health Worker Initial Outreach    CHW Initial Information Gathering:  Referral Source: PCP  Preferred Hospital: [facility]  [phone]  Current living arrangement:: Not Assessed  Community Resources: Chemical Dependency Services, OP Mental Health  Supplies used at home:: None  Equipment Currently Used at Home: none  Informal Support system:: Family    Patient accepts CC: Yes     RN Assessment 4/23/2020    Patient was able to schedule with psychiatrist and chem dep counseling.

## 2021-06-16 PROBLEM — F33.41 RECURRENT MAJOR DEPRESSION IN PARTIAL REMISSION (H): Status: ACTIVE | Noted: 2019-04-23

## 2021-06-16 PROBLEM — F12.21 CANNABIS USE DISORDER, SEVERE, IN SUSTAINED REMISSION (H): Status: ACTIVE | Noted: 2018-10-31

## 2021-06-16 PROBLEM — F10.21 ALCOHOL USE DISORDER, SEVERE, IN SUSTAINED REMISSION, DEPENDENCE (H): Status: ACTIVE | Noted: 2018-10-31

## 2021-06-16 PROBLEM — F50.89 ATYPICAL EATING DISORDER: Status: ACTIVE | Noted: 2019-07-27

## 2021-06-16 PROBLEM — F60.3 BORDERLINE PERSONALITY DISORDER (H): Status: ACTIVE | Noted: 2019-10-03

## 2021-06-16 PROBLEM — F41.1 GENERALIZED ANXIETY DISORDER: Status: ACTIVE | Noted: 2018-10-03

## 2021-06-16 PROBLEM — F33.1 MODERATE EPISODE OF RECURRENT MAJOR DEPRESSIVE DISORDER (H): Status: ACTIVE | Noted: 2018-10-03

## 2021-06-19 NOTE — LETTER
Letter by Tesha Rodriguez MD at      Author: Tesha Rodriguez MD Service: -- Author Type: --    Filed:  Encounter Date: 11/5/2019 Status: Signed         11/5/19      To whom it may concern:    I am writing this letter in regard to Cong Tuttle.  Cong has significant anxiety and benefits from having his dog with him.  I am therefore writing this letter to ask that you consider allowing him to bring his dog as an emotional support animal into the [facility] program with him.  Please feel free to contact me if you have any questions.  I appreciate your consideration in this matter.      Sincerely,        Tesha Rodriguez MD

## 2021-06-20 NOTE — LETTER
Letter by Tesha Rodriguez MD at      Author: Tesha Rodriguez MD Service: -- Author Type: --    Filed:  Encounter Date: 2/25/2020 Status: (Other)         February 25, 2020     Patient: Cong Tuttle   YOB: 1990   Date of Visit: 2/25/2020       To Whom it May Concern:    I am writing to confirm that I did not treat Cong for an eating disorder anytime before October, 2019.    If you have any questions or concerns, please don't hesitate to call.    Sincerely,         Electronically signed by Tesha Rodriguez MD

## 2021-06-20 NOTE — LETTER
Letter by Robina Ospina CHW at      Author: Robina Ospina CHW Service: -- Author Type: --    Filed:  Encounter Date: 4/24/2020 Status: (Other)       CARE COORDINATION  [facility]  [address]  [place] [address]    April 28, 2020    Cong Tuttle  [address] [facility] [address]      Dear Cong,    I am a clinic community health worker  who works with Tesha Rodriguez MD at the [facility]. I have been trying to reach you recently to introduce Clinic Care Coordination and to see if there was anything I could assist you with.  Below is a description of clinic care coordination and how I can further assist you.      The clinic care coordination team is made up of a registered nurse,  and community health worker who understand the health care system. The goal of clinic care coordination is to help you manage your health and improve access to the health care system in the most efficient manner. The team can assist you in meeting your health care goals by providing education, coordinating services, strengthening the communication among your providers and supporting you with any resource needs.    Please feel free to contact me at [phone] with any questions or concerns. We are focused on providing you with the highest-quality healthcare experience possible and that all starts with you.     Sincerely,     Robina Community Health Worker

## 2021-06-22 ENCOUNTER — APPOINTMENT (OUTPATIENT)
Dept: URBAN - METROPOLITAN AREA CLINIC 253 | Age: 31
Setting detail: DERMATOLOGY
End: 2021-06-23

## 2021-06-22 VITALS — RESPIRATION RATE: 15 BRPM | WEIGHT: 163 LBS | HEIGHT: 69 IN

## 2021-06-22 DIAGNOSIS — L85.3 XEROSIS CUTIS: ICD-10-CM

## 2021-06-22 DIAGNOSIS — L663 OTHER SPECIFIED DISEASES OF HAIR AND HAIR FOLLICLES: ICD-10-CM

## 2021-06-22 DIAGNOSIS — L90.5 SCAR CONDITIONS AND FIBROSIS OF SKIN: ICD-10-CM

## 2021-06-22 DIAGNOSIS — L738 OTHER SPECIFIED DISEASES OF HAIR AND HAIR FOLLICLES: ICD-10-CM

## 2021-06-22 PROBLEM — L02.423 FURUNCLE OF RIGHT UPPER LIMB: Status: ACTIVE | Noted: 2021-06-22

## 2021-06-22 PROBLEM — L02.12 FURUNCLE OF NECK: Status: ACTIVE | Noted: 2021-06-22

## 2021-06-22 PROBLEM — L02.426 FURUNCLE OF LEFT LOWER LIMB: Status: ACTIVE | Noted: 2021-06-22

## 2021-06-22 PROBLEM — L02.425 FURUNCLE OF RIGHT LOWER LIMB: Status: ACTIVE | Noted: 2021-06-22

## 2021-06-22 PROBLEM — L02.424 FURUNCLE OF LEFT UPPER LIMB: Status: ACTIVE | Noted: 2021-06-22

## 2021-06-22 PROCEDURE — OTHER PRESCRIPTION: OTHER

## 2021-06-22 PROCEDURE — OTHER ADDITIONAL NOTES: OTHER

## 2021-06-22 PROCEDURE — OTHER COUNSELING: OTHER

## 2021-06-22 PROCEDURE — 99204 OFFICE O/P NEW MOD 45 MIN: CPT

## 2021-06-22 RX ORDER — CEPHALEXIN 500 MG/1
500 MG TABLET ORAL BID
Qty: 28 | Refills: 0 | Status: ERX | COMMUNITY
Start: 2021-06-22

## 2021-06-22 RX ORDER — CLINDAMYCIN PHOSPHATE 10 MG/ML
1% SOLUTION TOPICAL BID-TID
Qty: 1 | Refills: 1 | Status: ERX | COMMUNITY
Start: 2021-06-22

## 2021-06-22 ASSESSMENT — LOCATION DETAILED DESCRIPTION DERM
LOCATION DETAILED: MID POSTERIOR NECK
LOCATION DETAILED: LEFT ANTERIOR LATERAL DISTAL UPPER ARM
LOCATION DETAILED: LEFT ANTERIOR MEDIAL PROXIMAL THIGH
LOCATION DETAILED: LEFT PROXIMAL POSTERIOR THIGH
LOCATION DETAILED: RIGHT ANTERIOR MEDIAL PROXIMAL THIGH
LOCATION DETAILED: RIGHT DISTAL POSTERIOR UPPER ARM
LOCATION DETAILED: LEFT DISTAL POSTERIOR UPPER ARM
LOCATION DETAILED: LEFT PROXIMAL POSTERIOR UPPER ARM
LOCATION DETAILED: RIGHT ANTERIOR LATERAL DISTAL UPPER ARM
LOCATION DETAILED: RIGHT PROXIMAL POSTERIOR THIGH

## 2021-06-22 ASSESSMENT — LOCATION SIMPLE DESCRIPTION DERM
LOCATION SIMPLE: RIGHT THIGH
LOCATION SIMPLE: POSTERIOR NECK
LOCATION SIMPLE: LEFT THIGH
LOCATION SIMPLE: RIGHT POSTERIOR THIGH
LOCATION SIMPLE: LEFT POSTERIOR THIGH
LOCATION SIMPLE: LEFT UPPER ARM
LOCATION SIMPLE: RIGHT UPPER ARM

## 2021-06-22 ASSESSMENT — LOCATION ZONE DERM
LOCATION ZONE: NECK
LOCATION ZONE: LEG
LOCATION ZONE: ARM
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
## 2021-06-26 ENCOUNTER — HEALTH MAINTENANCE LETTER (OUTPATIENT)
Age: 31
End: 2021-06-26

## 2021-06-27 NOTE — PROGRESS NOTES
Progress Notes by Elinor Pandya LADC at 7/26/2019  2:50 PM     Author: Elinor Pandya LADC Service: -- Author Type: Licensed Alcohol and Drug Counselor    Filed: 7/26/2019  3:00 PM Encounter Date: 7/26/2019 Status: Attested    : Elinor Pandya LADC (Licensed Alcohol and Drug Counselor) Cosigner: Estella Coker Psy.D, BUD at 7/31/2019 12:19 AM    Attestation signed by Estella Coker Psy.D, LP at 7/31/2019 12:19 AM    I have read, discussed and agree with the documentation provided by EVAN Blunt LADC.  Estella Coker PsyD, ABPP, LP                    Weekly Progress Note  Cong Tuttle  1990  [id number]      D) Pt attended 3/3 groups this week with no absences. Patient attended 1 individual sessions this week on 7/25/19  A) Staff facilitated groups and reviewed tx progress. Assessed for [facility]. R) No VAP needed at this time.     Any significant events, defines as events that impact patients relationship with others inside and outside of treatment: Yes: patient was released from hospital on 6/10/19. Patient reported that another patient had requested to move into his apartment (see 1:1 appointment on 7/3/19). Patient has communicated to the other patient that she is not allowed to move in, this patient to communicate to staff if there are any more requests from the other patient.   Indicate any changes or monitoring of physical or mental health problems: Yes: patient reports ongoing self harm, last reported on 7/26/19.  Patient reports some passive SI, no intent.   Indicate involvement by any outside supports: Yes: patient has a sponsor, psychiatrist in [place].   IAPP reviewed and modified as needed: NA    Pt working on the following dimensions:  Dimension #1 - Withdrawal Potential - Risk 0. Patient reports last use of alcohol on 6/5/19 at intake, most recent pattern of use has been 3-9 drinks 2-4x per week. Patient was not requested to complete a UA on 6/19/19 which was  positive for benzodiazepines, patient is currently prescribed klonopin, did not indicate recent alcohol use. Patient reported having two glasses of wine on 6/21/19. Patient reported drinking a beer on 7/8/19. Patient was requested to complete a UA on 7/5/19, results indicated that it was diluted. Patient was requested to complete another UA on 7/8/19, which was negative for all tested substances, including patient's prescribed benzodiazepines. These results were reviewed with patient, patient presented current medication, reported he has been taking it, and also reports that he has been drinking large amounts of fluids, with restricted eating during the day, which may be effective results. Patient does not report or display any withdrawal symptoms at this time.   Specific goals from treatment plan addressed this week:  Patient reported no substance use this week. Patient was not requested to complete a UA.   Effectiveness of strategies:  Strategies appear effective.   Dimension #2 - Biomedical - Risk 0. Patient reports no current health concerns, but does report restricting his eating. Patient has medical insurance and is able to access medical care as needed.   Specific goals from treatment plan addressed this week:  Patient reported no changes to physical health this week.   Effectiveness of strategies:  Strategies appear effective.   Dimension #3 - Emotional/Behavioral/Cognitive - Risk 2. Patient reports mental health diagnoses of major depressive disorder, generalized anxiety disorder, and borderline personality disorder. Patient reports that he does currently have psychiatry services that he regularly attends (weekly currently). Patient denies any other current mental health services, but does verbalize a desire to obtain therapy services. Patient endorses current mental health medications. Patient was recently discharged from a stay on a mental health unit due to suicidal ideation. Patient has a long history  of struggling with suicidal ideation and attempts. Patient endorses active struggles with food restriction, as well as self-harm. Patient states that he greatly struggles with impulse control at this time, and concern of engaging more in self harm when not drinking. Patient denies any current suicidal or homicidal thoughts or plans. Patient is oriented x4.   Active interventions to stabilize mental health symptoms:  Patient reports taking medications as prescribed. Counselor checking in with patient at each appointment regarding SIB and SI, patient continues to be in agreement to follow safety plan. Patient reported minimal SI this week. Patient attended therapy appointment this week.   Specific goals from treatment plan addressed this week:  Patient reported psychiatry appointment on 7/27/19. Patient reported using skill of waiting out urges to self-harm and was successful when using this skill, reported that he feels the amount of self-harm is reducing. Counselor and patient discussed how patient's lack of self-compassion reinforces the self harm, discussed ways to utilize soothing techniques to show self-compassion.   Effectiveness of strategies:  Strategies appear effective.   Dimension #4 - Readiness for Change - Risk 0. Patient verbalizes that he feels that he is here on his own regard, despite being recommended by his psychiatrist. Patient does verbalize that he believes his use is problematic, and that he wants help to change at this time. Patient verbalizes willingness to follow all recommendations made for him while in treatment. Patient appears genuinely motivated for treatment and change at this time.  Specific goals from treatment plan addressed this week:  Patient attended 3/3 groups this week and scheduled 1:1.   Effectiveness of strategies:  Strategies appear effective.   Dimension #5 - Relapse Potential - Risk 3. Patient reports regular use of alcohol up until 6/5/19, and so he therefore may  currently lack the necessary coping skills to help him prevent relapse at this time. He indicated that he struggles with impulse control which could indicate an increased risk of relapse at this time. Patient does report some past treatment experience and a history of periods of sustained sobriety. He therefore may have some knowledge of the skills needed to maintain sobriety, however it does not appear that he has been utilizing those skills in the past 6 months. Patient's lack of current skills, mental health, and impulse control issues appear to indicate an increased risk of relapse at this time. Patient has had 2 relapses in outpatient treatment.   Specific goals from treatment plan addressed this week:  Patient reported minimal urges and no use of substances this week.   Effectiveness of strategies:  Strategies appear effective.   Dimension #6 - Recovery Environment - Risk 2. Patient currently works full-time, and do he does appear to have some structure for his daily life. Patient struggled to identify sober hobbies, so he does appear to lack meaningful activity outside of work at this time. Patient states that he currently has a stable place to live, however it is not always the most supportive as he sometimes isolates. Patient indicated peer support for sobriety, and that he has been attending support groups, so he does have some support for his sobriety. He states that his family does not know about his use currently, and that they would not be supportive of him if they were made aware. Patient denies any current legal involvement.   Specific goals from treatment plan addressed this week:  Patient and counselor scheduled a family meeting for 8/7/19, discussed patient's goals for this meeting. Patient reported plan to get out more over the weekend.    Effectiveness of strategies:  Strategies appear effective.   T) Treatment plan updated: no  Patient notified and in agreement: N/A.  Patient educated on Grief  and Loss. Patient has completed 67 of 90 program hours at this time, patient will remain in phase I until 8/9/19. Projected discharge date is 9/20/19. Current discharge plan is phase III/ program.     EVAN Blunt, [facility]  7/26/2019, 3:00 PM       Weekly Educational Topics Date   1. Dual Diagnoses, week 1    2. Dual Diagnoses, week 2    3. Stress Management    4. Feelings/Emotions    5. Thinking 6/14   6. Change 6/17, 6/19, 6/21   7. Recovery Support 6/24, 6/26, 6/28   8. Relationships/Communication 7/1, 7/3, 7/5   9. Addiction 101 7/8, 7/10, 7/12   10. Relapse Prevention 7/15, 7/17, 7/19   11. Grief and Loss 7/22, 7/24, 7/26   12. Strengths    13. Wellness    Mindfulness  6/14, 6/21, 6/28, 7/5, 7/12, 7/19, 7/26

## 2021-06-27 NOTE — PROGRESS NOTES
Progress Notes by Elinor Pandya LADC at 8/10/2019 12:38 PM     Author: Elinor Pandya LADC Service: -- Author Type: Licensed Alcohol and Drug Counselor    Filed: 8/10/2019 12:48 PM Encounter Date: 8/10/2019 Status: Attested    : Elinor Pandya LADC (Licensed Alcohol and Drug Counselor) Cosigner: Estella Coker Psy.D, BUD at 9/10/2019  8:32 PM    Attestation signed by Estella Coker Psy.D, LP at 9/10/2019  8:32 PM    I have read, discussed and agree with the documentation provided by EVAN Blunt LADC.  Estella Coker PsyD, ABPP, LP                    Weekly Progress Note  Cong Tuttle  1990  [id number]      D) Pt attended 3/3 groups this week with no absences. Patient attended 1 individual sessions this week on 8/9/19 and 1 family session on 8/7/19.   A) Staff facilitated groups and reviewed tx progress. Assessed for [facility]. R) No VAP needed at this time.     Any significant events, defines as events that impact patients relationship with others inside and outside of treatment: Yes: patient was released from hospital on 6/10/19. Patient reported that another patient had requested to move into his apartment (see 1:1 appointment on 7/3/19). Patient has communicated to the other patient that she is not allowed to move in, this patient to communicate to staff if there are any more requests from the other patient.   Indicate any changes or monitoring of physical or mental health problems: Yes: patient reports ongoing self harm, last reported on 8/9/19.  Patient reports some passive SI, no intent.   Indicate involvement by any outside supports: Yes: patient has a sponsor, psychiatrist in [place], therapist at [facility].  IAPP reviewed and modified as needed: NA    Pt working on the following dimensions:  Dimension #1 - Withdrawal Potential - Risk 0. Patient reports last use of alcohol on 6/5/19 at intake, most recent pattern of use has been 3-9 drinks 2-4x per week.  Patient was not requested to complete a UA on 6/19/19 which was positive for benzodiazepines, patient is currently prescribed klonopin, did not indicate recent alcohol use. Patient reported having two glasses of wine on 6/21/19. Patient reported drinking a beer on 7/8/19. Patient was requested to complete a UA on 7/5/19, results indicated that it was diluted. Patient was requested to complete another UA on 7/8/19, which was negative for all tested substances, including patient's prescribed benzodiazepines. These results were reviewed with patient, patient presented current medication, reported he has been taking it, and also reports that he has been drinking large amounts of fluids, with restricted eating during the day, which may be effective results. Patient does not report or display any withdrawal symptoms at this time.   Specific goals from treatment plan addressed this week:  Patient reported no substance use this week. Patient was requested to complete a UA on 8/5/19 which was positive for benzodiazepines, which the patient is currently prescribed.   Effectiveness of strategies:  Strategies appear effective.   Dimension #2 - Biomedical - Risk 0. Patient reports no current health concerns, but does report restricting his eating. Patient has medical insurance and is able to access medical care as needed.   Specific goals from treatment plan addressed this week:  Patient reported no changes to physical health this week.   Effectiveness of strategies:  Strategies appear effective.   Dimension #3 - Emotional/Behavioral/Cognitive - Risk 2. Patient reports mental health diagnoses of major depressive disorder, generalized anxiety disorder, and borderline personality disorder. Patient reports that he does currently have psychiatry services that he regularly attends (weekly currently). Patient denies any other current mental health services, but does verbalize a desire to obtain therapy services. Patient endorses  current mental health medications. Patient was recently discharged from a stay on a mental health unit due to suicidal ideation. Patient has a long history of struggling with suicidal ideation and attempts. Patient endorses active struggles with food restriction, as well as self-harm. Patient states that he greatly struggles with impulse control at this time, and concern of engaging more in self harm when not drinking. Patient denies any current suicidal or homicidal thoughts or plans. Patient is oriented x4.   Active interventions to stabilize mental health symptoms:  Patient reports taking medications as prescribed. Counselor checking in with patient at each appointment regarding SIB and SI, patient continues to be in agreement to follow safety plan. Patient reported minimal SI this week.   Specific goals from treatment plan addressed this week:  Patient reported working on acceptance related to his parents, identified pattern of isolation, avoiding spending time with friends.    Effectiveness of strategies:  Strategies appear effective.   Dimension #4 - Readiness for Change - Risk 0. Patient verbalizes that he feels that he is here on his own regard, despite being recommended by his psychiatrist. Patient does verbalize that he believes his use is problematic, and that he wants help to change at this time. Patient verbalizes willingness to follow all recommendations made for him while in treatment. Patient appears genuinely motivated for treatment and change at this time.  Specific goals from treatment plan addressed this week:  Patient attended 3/3 groups this week and scheduled 1:1s. Patient to start phase II next week, attending 2 days per week.   Effectiveness of strategies:  Strategies appear effective.   Dimension #5 - Relapse Potential - Risk 3. Patient reports regular use of alcohol up until 6/5/19, and so he therefore may currently lack the necessary coping skills to help him prevent relapse at this time.  He indicated that he struggles with impulse control which could indicate an increased risk of relapse at this time. Patient does report some past treatment experience and a history of periods of sustained sobriety. He therefore may have some knowledge of the skills needed to maintain sobriety, however it does not appear that he has been utilizing those skills in the past 6 months. Patient's lack of current skills, mental health, and impulse control issues appear to indicate an increased risk of relapse at this time. Patient has had 2 relapses in outpatient treatment.   Specific goals from treatment plan addressed this week:  Patient reported minimal urges and no use of substances this week.   Effectiveness of strategies:  Strategies appear effective.   Dimension #6 - Recovery Environment - Risk 2. Patient currently works full-time, and do he does appear to have some structure for his daily life. Patient struggled to identify sober hobbies, so he does appear to lack meaningful activity outside of work at this time. Patient states that he currently has a stable place to live, however it is not always the most supportive as he sometimes isolates. Patient indicated peer support for sobriety, and that he has been attending support groups, so he does have some support for his sobriety. He states that his family does not know about his use currently, and that they would not be supportive of him if they were made aware. Patient denies any current legal involvement.   Specific goals from treatment plan addressed this week:  Patient reported plan to hang out with a friend this weekend, but expressed some reluctancy in following through with plans. Patient and counselor met with patient's parents for a family meeting. Patient discussed goal to try out other recovery meetings.   Effectiveness of strategies:  Strategies appear effective.   T) Treatment plan updated: no  Patient notified and in agreement: N/A.  Patient educated  on Wellness. Patient has completed 89 of 90 program hours at this time, patient to start phase II on 8/12/19. Projected discharge date is 9/20/19. Current discharge plan is phase III/ program.     EVAN Blunt, [facility]  8/10/2019, 12:42 PM       Weekly Educational Topics Date   1. Dual Diagnoses, week 1    2. Dual Diagnoses, week 2    3. Stress Management    4. Feelings/Emotions    5. Thinking 6/14   6. Change 6/17, 6/19, 6/21   7. Recovery Support 6/24, 6/26, 6/28   8. Relationships/Communication 7/1, 7/3, 7/5   9. Addiction 101 7/8, 7/10, 7/12   10. Relapse Prevention 7/15, 7/17, 7/19   11. Grief and Loss 7/22, 7/24, 7/26   12. Strengths 7/29, 7/31, 8/2   13. Wellness 8/5, 8/7, 8/9   Mindfulness  6/14, 6/21, 6/28, 7/5, 7/12, 7/19, 7/26, 8/2, 8/9

## 2021-06-27 NOTE — PROGRESS NOTES
Progress Notes by Elinor Pandya LADC at 8/23/2019  2:06 PM     Author: Elinor Pandya LADC Service: -- Author Type: Licensed Alcohol and Drug Counselor    Filed: 8/23/2019  2:40 PM Encounter Date: 8/23/2019 Status: Attested    : Elinor Pandya LADC (Licensed Alcohol and Drug Counselor) Cosigner: Estella Coker Psy.D, LP at 9/10/2019  8:28 PM    Attestation signed by Estella Coker Psy.D, LP at 9/10/2019  8:28 PM    I have read, discussed and agree with the documentation provided by EVAN Bulnt LADC.  Estella Coker PsyD, ABPP, LP                    Weekly Progress Note  Cong Tuttle  1990  [id number]      D) Pt attended 2/2 groups this week with no absences, patient is in phase II. Patient attended 1 individual sessions this week on 8/22/19  A) Staff facilitated groups and reviewed tx progress. Assessed for [facility]. R) No VAP needed at this time.     Any significant events, defines as events that impact patients relationship with others inside and outside of treatment: Yes: patient was released from hospital on 6/10/19. Patient reported that another patient had requested to move into his apartment (see 1:1 appointment on 7/3/19). Patient has communicated to the other patient that she is not allowed to move in, this patient to communicate to staff if there are any more requests from the other patient.   Indicate any changes or monitoring of physical or mental health problems: Yes: patient reports ongoing self harm, last reported on 8/23/19.  Patient reports some passive SI, no intent.   Indicate involvement by any outside supports: Yes: patient has a sponsor, psychiatrist in [place], therapist at [facility].  IAPP reviewed and modified as needed: NA    Pt working on the following dimensions:  Dimension #1 - Withdrawal Potential - Risk 0. Patient reports last use of alcohol on 6/5/19 at intake, most recent pattern of use has been 3-9 drinks 2-4x per week. Patient  was not requested to complete a UA on 6/19/19 which was positive for benzodiazepines, patient is currently prescribed klonopin, did not indicate recent alcohol use. Patient reported having two glasses of wine on 6/21/19. Patient reported drinking a beer on 7/8/19. Patient was requested to complete a UA on 7/5/19, results indicated that it was diluted. Patient was requested to complete another UA on 7/8/19, which was negative for all tested substances, including patient's prescribed benzodiazepines. These results were reviewed with patient, patient presented current medication, reported he has been taking it, and also reports that he has been drinking large amounts of fluids, with restricted eating during the day, which may be effective results. Patient was requested to complete a UA on 8/5/19 which was positive for benzodiazepines, which the patient is currently prescribed. Patient does not report or display any withdrawal symptoms at this time.   Specific goals from treatment plan addressed this week:  Patient reported no substance use this week. Patient was not requested to complete a UA this week.   Effectiveness of strategies:  Strategies appear effective.   Dimension #2 - Biomedical - Risk 0. Patient reports no current health concerns, but does report restricting his eating. Patient has medical insurance and is able to access medical care as needed.   Specific goals from treatment plan addressed this week:  Patient reported no changes to physical health this week.   Effectiveness of strategies:  Strategies appear effective.   Dimension #3 - Emotional/Behavioral/Cognitive - Risk 2. Patient reports mental health diagnoses of major depressive disorder, generalized anxiety disorder, and borderline personality disorder. Patient reports that he does currently have psychiatry services that he regularly attends (weekly currently). Patient denies any other current mental health services, but does verbalize a desire to  obtain therapy services. Patient endorses current mental health medications. Patient was recently discharged from a stay on a mental health unit due to suicidal ideation. Patient has a long history of struggling with suicidal ideation and attempts. Patient endorses active struggles with food restriction, as well as self-harm. Patient states that he greatly struggles with impulse control at this time, and concern of engaging more in self harm when not drinking. Patient denies any current suicidal or homicidal thoughts or plans. Patient is oriented x4.   Active interventions to stabilize mental health symptoms:  Patient reports taking medications as prescribed. Counselor checking in with patient at each appointment regarding SIB and SI, patient continues to be in agreement to follow safety plan. Patient reported SI this week, processed with counselor.    Specific goals from treatment plan addressed this week:  Patient reported using skills of tapping, breathing and calling his mom to cope with urges to self-harm. Patient processed a trigger to self-harm with counselor on 8/22/19. Patient and counselor discussed referral to DBT program.   Effectiveness of strategies:  Strategies appear effective.   Dimension #4 - Readiness for Change - Risk 0. Patient verbalizes that he feels that he is here on his own regard, despite being recommended by his psychiatrist. Patient does verbalize that he believes his use is problematic, and that he wants help to change at this time. Patient verbalizes willingness to follow all recommendations made for him while in treatment. Patient appears genuinely motivated for treatment and change at this time.  Specific goals from treatment plan addressed this week:  Patient attended 2/2 groups this week and scheduled 1:1.   Effectiveness of strategies:  Strategies appear effective.   Dimension #5 - Relapse Potential - Risk 3. Patient reports regular use of alcohol up until 6/5/19, and so he  therefore may currently lack the necessary coping skills to help him prevent relapse at this time. He indicated that he struggles with impulse control which could indicate an increased risk of relapse at this time. Patient does report some past treatment experience and a history of periods of sustained sobriety. He therefore may have some knowledge of the skills needed to maintain sobriety, however it does not appear that he has been utilizing those skills in the past 6 months. Patient's lack of current skills, mental health, and impulse control issues appear to indicate an increased risk of relapse at this time. Patient has had 2 relapses in outpatient treatment.   Specific goals from treatment plan addressed this week:  Patient reported minimal urges and no use of substances this week.   Effectiveness of strategies:  Strategies appear effective.   Dimension #6 - Recovery Environment - Risk 2. Patient currently works full-time, and do he does appear to have some structure for his daily life. Patient struggled to identify sober hobbies, so he does appear to lack meaningful activity outside of work at this time. Patient states that he currently has a stable place to live, however it is not always the most supportive as he sometimes isolates. Patient indicated peer support for sobriety, and that he has been attending support groups, so he does have some support for his sobriety. He states that his family does not know about his use currently, and that they would not be supportive of him if they were made aware. Patient denies any current legal involvement.   Specific goals from treatment plan addressed this week:  Patient reported spending time with friends this week. Patient reports he is continuing to work on boundaries at work. Patient reported goal to get out of the house over the weekend.   Effectiveness of strategies:  Strategies appear effective.   T) Treatment plan updated: no  Patient notified and in  agreement: N/A   Patient educated on Stress Management. Patient has completed 103 of 90 program hours at this time, patient is in phase II. Projected discharge date is 9/20/19. Current discharge plan is phase III/ program.     EVAN Blunt, [facility]  8/23/2019, 2:40 PM       Weekly Educational Topics Date   1. Dual Diagnoses, week 1    2. Dual Diagnoses, week 2    3. Stress Management 8/12, 8/16 8/19, 8/23   4. Feelings/Emotions    5. Thinking 6/14   6. Change 6/17, 6/19, 6/21   7. Recovery Support 6/24, 6/26, 6/28   8. Relationships/Communication 7/1, 7/3, 7/5   9. Addiction 101 7/8, 7/10, 7/12   10. Relapse Prevention 7/15, 7/17, 7/19   11. Grief and Loss 7/22, 7/24, 7/26   12. Strengths 7/29, 7/31, 8/2   13. Wellness 8/5, 8/7, 8/9   Mindfulness  6/14, 6/21, 6/28, 7/5, 7/12, 7/19, 7/26, 8/2, 8/9, 8/16

## 2021-06-27 NOTE — PROGRESS NOTES
Progress Notes by Elinor Pandya LADC at 8/17/2019  5:42 PM     Author: Elinor Pandya LADC Service: -- Author Type: Licensed Alcohol and Drug Counselor    Filed: 8/17/2019  5:50 PM Encounter Date: 8/17/2019 Status: Attested    : Elinor Pandya LADC (Licensed Alcohol and Drug Counselor) Cosigner: Estella Coker Psy.D, LP at 9/10/2019  8:31 PM    Attestation signed by Estella Coker Psy.D, LP at 9/10/2019  8:31 PM    I have read, discussed and agree with the documentation provided by EVAN Blunt LADC.  Estella Coker PsyD, ABPP, LP                    Weekly Progress Note  Cong Tuttle  1990  [id number]      D) Pt attended 2/2 groups this week with no absences, patient is in phase II. Patient attended 1 individual sessions this week on 8/15/19.   A) Staff facilitated groups and reviewed tx progress. Assessed for [facility]. R) No VAP needed at this time.     Any significant events, defines as events that impact patients relationship with others inside and outside of treatment: Yes: patient was released from hospital on 6/10/19. Patient reported that another patient had requested to move into his apartment (see 1:1 appointment on 7/3/19). Patient has communicated to the other patient that she is not allowed to move in, this patient to communicate to staff if there are any more requests from the other patient.   Indicate any changes or monitoring of physical or mental health problems: Yes: patient reports ongoing self harm, last reported on 8/16/19.  Patient reports some passive SI, no intent.   Indicate involvement by any outside supports: Yes: patient has a sponsor, psychiatrist in [place], therapist at [facility].  IAPP reviewed and modified as needed: NA    Pt working on the following dimensions:  Dimension #1 - Withdrawal Potential - Risk 0. Patient reports last use of alcohol on 6/5/19 at intake, most recent pattern of use has been 3-9 drinks 2-4x per week.  Patient was not requested to complete a UA on 6/19/19 which was positive for benzodiazepines, patient is currently prescribed klonopin, did not indicate recent alcohol use. Patient reported having two glasses of wine on 6/21/19. Patient reported drinking a beer on 7/8/19. Patient was requested to complete a UA on 7/5/19, results indicated that it was diluted. Patient was requested to complete another UA on 7/8/19, which was negative for all tested substances, including patient's prescribed benzodiazepines. These results were reviewed with patient, patient presented current medication, reported he has been taking it, and also reports that he has been drinking large amounts of fluids, with restricted eating during the day, which may be effective results. Patient was requested to complete a UA on 8/5/19 which was positive for benzodiazepines, which the patient is currently prescribed. Patient does not report or display any withdrawal symptoms at this time.   Specific goals from treatment plan addressed this week:  Patient reported no substance use this week. Patient was not requested to complete a UA this week.   Effectiveness of strategies:  Strategies appear effective.   Dimension #2 - Biomedical - Risk 0. Patient reports no current health concerns, but does report restricting his eating. Patient has medical insurance and is able to access medical care as needed.   Specific goals from treatment plan addressed this week:  Patient reported no changes to physical health this week.   Effectiveness of strategies:  Strategies appear effective.   Dimension #3 - Emotional/Behavioral/Cognitive - Risk 2. Patient reports mental health diagnoses of major depressive disorder, generalized anxiety disorder, and borderline personality disorder. Patient reports that he does currently have psychiatry services that he regularly attends (weekly currently). Patient denies any other current mental health services, but does verbalize a  desire to obtain therapy services. Patient endorses current mental health medications. Patient was recently discharged from a stay on a mental health unit due to suicidal ideation. Patient has a long history of struggling with suicidal ideation and attempts. Patient endorses active struggles with food restriction, as well as self-harm. Patient states that he greatly struggles with impulse control at this time, and concern of engaging more in self harm when not drinking. Patient denies any current suicidal or homicidal thoughts or plans. Patient is oriented x4.   Active interventions to stabilize mental health symptoms:  Patient reports taking medications as prescribed. Counselor checking in with patient at each appointment regarding SIB and SI, patient continues to be in agreement to follow safety plan. Patient reported minimal SI this week.   Specific goals from treatment plan addressed this week:  Patient reported journaling as a coping skill this week, reported gaining insights intro triggers for self-harm.    Effectiveness of strategies:  Strategies appear effective.   Dimension #4 - Readiness for Change - Risk 0. Patient verbalizes that he feels that he is here on his own regard, despite being recommended by his psychiatrist. Patient does verbalize that he believes his use is problematic, and that he wants help to change at this time. Patient verbalizes willingness to follow all recommendations made for him while in treatment. Patient appears genuinely motivated for treatment and change at this time.  Specific goals from treatment plan addressed this week:  Patient attended 2/2 groups this week and scheduled 1:1.   Effectiveness of strategies:  Strategies appear effective.   Dimension #5 - Relapse Potential - Risk 3. Patient reports regular use of alcohol up until 6/5/19, and so he therefore may currently lack the necessary coping skills to help him prevent relapse at this time. He indicated that he struggles  with impulse control which could indicate an increased risk of relapse at this time. Patient does report some past treatment experience and a history of periods of sustained sobriety. He therefore may have some knowledge of the skills needed to maintain sobriety, however it does not appear that he has been utilizing those skills in the past 6 months. Patient's lack of current skills, mental health, and impulse control issues appear to indicate an increased risk of relapse at this time. Patient has had 2 relapses in outpatient treatment.   Specific goals from treatment plan addressed this week:  Patient reported minimal urges and no use of substances this week.   Effectiveness of strategies:  Strategies appear effective.   Dimension #6 - Recovery Environment - Risk 2. Patient currently works full-time, and do he does appear to have some structure for his daily life. Patient struggled to identify sober hobbies, so he does appear to lack meaningful activity outside of work at this time. Patient states that he currently has a stable place to live, however it is not always the most supportive as he sometimes isolates. Patient indicated peer support for sobriety, and that he has been attending support groups, so he does have some support for his sobriety. He states that his family does not know about his use currently, and that they would not be supportive of him if they were made aware. Patient denies any current legal involvement.   Specific goals from treatment plan addressed this week:  Patient reported attending a meeting on 8/9/19, is considering attending again on 8/16/19. Patient reported that he has been reaching out more, and hopes to get out of the house over the weekend.    Effectiveness of strategies:  Strategies appear effective.   T) Treatment plan updated: yes  Patient notified and in agreement: yes.  Patient educated on Stress Management. Patient has completed 96 of 90 program hours at this time, patient  is in phase II. Projected discharge date is 9/20/19. Current discharge plan is phase III/ program.     Elinor Pandya [facility]  8/17/2019, 5:49 PM       Weekly Educational Topics Date   1. Dual Diagnoses, week 1    2. Dual Diagnoses, week 2    3. Stress Management 8/12, 8/14, 8/16   4. Feelings/Emotions    5. Thinking 6/14   6. Change 6/17, 6/19, 6/21   7. Recovery Support 6/24, 6/26, 6/28   8. Relationships/Communication 7/1, 7/3, 7/5   9. Addiction 101 7/8, 7/10, 7/12   10. Relapse Prevention 7/15, 7/17, 7/19   11. Grief and Loss 7/22, 7/24, 7/26   12. Strengths 7/29, 7/31, 8/2   13. Wellness 8/5, 8/7, 8/9   Mindfulness  6/14, 6/21, 6/28, 7/5, 7/12, 7/19, 7/26, 8/2, 8/9, 8/16

## 2021-06-27 NOTE — PROGRESS NOTES
Progress Notes by Elinor Pandya LADC at 8/2/2019  3:19 PM     Author: Elinor Pandya LADC Service: -- Author Type: Licensed Alcohol and Drug Counselor    Filed: 8/2/2019  3:35 PM Encounter Date: 8/2/2019 Status: Attested    : Elinor Pandya LADC (Licensed Alcohol and Drug Counselor) Cosigner: Estella Coker Psy.D, BUD at 8/7/2019  9:23 PM    Attestation signed by Estella Coker Psy.D, LP at 8/7/2019  9:23 PM    I have read, discussed and agree with the documentation provided by EVAN Blunt LADC.  Estella Coker PsyD, ABPP, LP                    Weekly Progress Note  Cong Tuttle  1990  [id number]      D) Pt attended 3/3 groups this week with no absences. Patient attended 1 individual sessions this week on 8/1/19.   A) Staff facilitated groups and reviewed tx progress. Assessed for [facility]. R) No VAP needed at this time.     Any significant events, defines as events that impact patients relationship with others inside and outside of treatment: Yes: patient was released from hospital on 6/10/19. Patient reported that another patient had requested to move into his apartment (see 1:1 appointment on 7/3/19). Patient has communicated to the other patient that she is not allowed to move in, this patient to communicate to staff if there are any more requests from the other patient.   Indicate any changes or monitoring of physical or mental health problems: Yes: patient reports ongoing self harm, last reported on 8/1/19.  Patient reports some passive SI, no intent.   Indicate involvement by any outside supports: Yes: patient has a sponsor, psychiatrist in [place].   IAPP reviewed and modified as needed: NA    Pt working on the following dimensions:  Dimension #1 - Withdrawal Potential - Risk 0. Patient reports last use of alcohol on 6/5/19 at intake, most recent pattern of use has been 3-9 drinks 2-4x per week. Patient was not requested to complete a UA on 6/19/19 which was positive  for benzodiazepines, patient is currently prescribed klonopin, did not indicate recent alcohol use. Patient reported having two glasses of wine on 6/21/19. Patient reported drinking a beer on 7/8/19. Patient was requested to complete a UA on 7/5/19, results indicated that it was diluted. Patient was requested to complete another UA on 7/8/19, which was negative for all tested substances, including patient's prescribed benzodiazepines. These results were reviewed with patient, patient presented current medication, reported he has been taking it, and also reports that he has been drinking large amounts of fluids, with restricted eating during the day, which may be effective results. Patient does not report or display any withdrawal symptoms at this time.   Specific goals from treatment plan addressed this week:  Patient reported no substance use this week. Patient was not requested to complete a UA.   Effectiveness of strategies:  Strategies appear effective.   Dimension #2 - Biomedical - Risk 0. Patient reports no current health concerns, but does report restricting his eating. Patient has medical insurance and is able to access medical care as needed.   Specific goals from treatment plan addressed this week:  Patient reported no changes to physical health this week.   Effectiveness of strategies:  Strategies appear effective.   Dimension #3 - Emotional/Behavioral/Cognitive - Risk 2. Patient reports mental health diagnoses of major depressive disorder, generalized anxiety disorder, and borderline personality disorder. Patient reports that he does currently have psychiatry services that he regularly attends (weekly currently). Patient denies any other current mental health services, but does verbalize a desire to obtain therapy services. Patient endorses current mental health medications. Patient was recently discharged from a stay on a mental health unit due to suicidal ideation. Patient has a long history of  struggling with suicidal ideation and attempts. Patient endorses active struggles with food restriction, as well as self-harm. Patient states that he greatly struggles with impulse control at this time, and concern of engaging more in self harm when not drinking. Patient denies any current suicidal or homicidal thoughts or plans. Patient is oriented x4.   Active interventions to stabilize mental health symptoms:  Patient reports taking medications as prescribed. Counselor checking in with patient at each appointment regarding SIB and SI, patient continues to be in agreement to follow safety plan. Patient reported minimal SI this week.   Specific goals from treatment plan addressed this week:  Patient reported that reported continued self-harm, reported 1 instance of using skills of distraction to avoid self harm. Patient reported learning this week that he needs to make space for uncomfortable emotions and be more aware of pursuing his goals.   Effectiveness of strategies:  Strategies appear effective.   Dimension #4 - Readiness for Change - Risk 0. Patient verbalizes that he feels that he is here on his own regard, despite being recommended by his psychiatrist. Patient does verbalize that he believes his use is problematic, and that he wants help to change at this time. Patient verbalizes willingness to follow all recommendations made for him while in treatment. Patient appears genuinely motivated for treatment and change at this time.  Specific goals from treatment plan addressed this week:  Patient attended 3/3 groups this week and scheduled 1:1.   Effectiveness of strategies:  Strategies appear effective.   Dimension #5 - Relapse Potential - Risk 3. Patient reports regular use of alcohol up until 6/5/19, and so he therefore may currently lack the necessary coping skills to help him prevent relapse at this time. He indicated that he struggles with impulse control which could indicate an increased risk of relapse  at this time. Patient does report some past treatment experience and a history of periods of sustained sobriety. He therefore may have some knowledge of the skills needed to maintain sobriety, however it does not appear that he has been utilizing those skills in the past 6 months. Patient's lack of current skills, mental health, and impulse control issues appear to indicate an increased risk of relapse at this time. Patient has had 2 relapses in outpatient treatment.   Specific goals from treatment plan addressed this week:  Patient reported minimal urges and no use of substances this week.   Effectiveness of strategies:  Strategies appear effective.   Dimension #6 - Recovery Environment - Risk 2. Patient currently works full-time, and do he does appear to have some structure for his daily life. Patient struggled to identify sober hobbies, so he does appear to lack meaningful activity outside of work at this time. Patient states that he currently has a stable place to live, however it is not always the most supportive as he sometimes isolates. Patient indicated peer support for sobriety, and that he has been attending support groups, so he does have some support for his sobriety. He states that his family does not know about his use currently, and that they would not be supportive of him if they were made aware. Patient denies any current legal involvement.   Specific goals from treatment plan addressed this week:  Patient reported plan for spending time with family over the weekend up [place]. Patient reported he is continuing to experience some inertia to getting out and doing things, reported that he hasn't been to a recovery meeting in a few weeks.   Effectiveness of strategies:  Strategies appear effective.   T) Treatment plan updated: no  Patient notified and in agreement: N/A.  Patient educated on Strengths. Patient has completed 78 of 90 program hours at this time, patient will remain in phase I until 8/9/19.  Projected discharge date is 9/20/19. Current discharge plan is phase III/MH program.     EVAN Blunt, [facility]  8/2/2019, 3:35 PM       Weekly Educational Topics Date   1. Dual Diagnoses, week 1    2. Dual Diagnoses, week 2    3. Stress Management    4. Feelings/Emotions    5. Thinking 6/14   6. Change 6/17, 6/19, 6/21   7. Recovery Support 6/24, 6/26, 6/28   8. Relationships/Communication 7/1, 7/3, 7/5   9. Addiction 101 7/8, 7/10, 7/12   10. Relapse Prevention 7/15, 7/17, 7/19   11. Grief and Loss 7/22, 7/24, 7/26   12. Strengths 7/29, 7/31, 8/2   13. Wellness    Mindfulness  6/14, 6/21, 6/28, 7/5, 7/12, 7/19, 7/26, 8/2

## 2021-06-27 NOTE — PROGRESS NOTES
Progress Notes by Elinor Pandya LADC at 7/19/2019  4:40 PM     Author: Elinor Pandya LADC Service: -- Author Type: Licensed Alcohol and Drug Counselor    Filed: 7/19/2019  4:48 PM Encounter Date: 7/19/2019 Status: Attested    : Elinor Pandya LADC (Licensed Alcohol and Drug Counselor) Cosigner: Estella Coker Psy.D, BUD at 7/31/2019 12:20 AM    Attestation signed by Estella Coker Psy.D, LP at 7/31/2019 12:20 AM    I have read, discussed and agree with the documentation provided by EVAN Blunt LADC.  Estella Coker PsyD, ABPP, LP                    Weekly Progress Note  Cong Tuttle  1990  [id number]      D) Pt attended 3/3 groups this week with no absences. Patient attended 1 individual sessions this week on 7/18/19  A) Staff facilitated groups and reviewed tx progress. Assessed for [facility]. R) No VAP needed at this time.     Any significant events, defines as events that impact patients relationship with others inside and outside of treatment: Yes: patient was released from hospital on 6/10/19. Patient reported that another patient had requested to move into his apartment (see 1:1 appointment on 7/3/19). Patient has communicated to the other patient that she is not allowed to move in, this patient to communicate to staff if there are any more requests from the other patient.   Indicate any changes or monitoring of physical or mental health problems: Yes: patient reported SIB on 6/19/19, and 6/31/19, 7/6/19, 7/7/19, and 7/11/19, 7/15/19, 7/19/19. Patient reports some passive SI, no intent.   Indicate involvement by any outside supports: Yes: patient has a sponsor, psychiatrist in [place].   IAPP reviewed and modified as needed: NA    Pt working on the following dimensions:  Dimension #1 - Withdrawal Potential - Risk 0. Patient reports last use of alcohol on 6/5/19 at intake, most recent pattern of use has been 3-9 drinks 2-4x per week. Patient was not requested to complete a  UA on 6/19/19 which was positive for benzodiazepines, patient is currently prescribed klonopin, did not indicate recent alcohol use. Patient reported having two glasses of wine on 6/21/19. Patient reported drinking a beer on 7/8/19. Patient was requested to complete a UA on 7/5/19, results indicated that it was diluted. Patient was requested to complete another UA on 7/8/19, which was negative for all tested substances, including patient's prescribed benzodiazepines. These results were reviewed with patient, patient presented current medication, reported he has been taking it, and also reports that he has been drinking large amounts of fluids, with restricted eating during the day, which may be effective results. Patient does not report or display any withdrawal symptoms at this time.   Specific goals from treatment plan addressed this week:  Patient reported no substance use this week. Patient was not requested to complete a UA.   Effectiveness of strategies:  Strategies appear effective.   Dimension #2 - Biomedical - Risk 0. Patient reports no current health concerns, but does report restricting his eating. Patient has medical insurance and is able to access medical care as needed.   Specific goals from treatment plan addressed this week:  Patient reported a minor cold this week, but no other changes to physical health.    Effectiveness of strategies:  Strategies appear effective.   Dimension #3 - Emotional/Behavioral/Cognitive - Risk 2. Patient reports mental health diagnoses of major depressive disorder, generalized anxiety disorder, and borderline personality disorder. Patient reports that he does currently have psychiatry services that he regularly attends (weekly currently). Patient denies any other current mental health services, but does verbalize a desire to obtain therapy services. Patient endorses current mental health medications. Patient was recently discharged from a stay on a mental health unit due  to suicidal ideation. Patient has a long history of struggling with suicidal ideation and attempts. Patient endorses active struggles with food restriction, as well as self-harm. Patient states that he greatly struggles with impulse control at this time, and concern of engaging more in self harm when not drinking. Patient denies any current suicidal or homicidal thoughts or plans. Patient is oriented x4.   Active interventions to stabilize mental health symptoms:  Patient reports taking medications as prescribed. Counselor checking in with patient at each appointment regarding SIB and SI, patient continues to be in agreement to follow safety plan. Patient reported minimal SI this week. Patient attended therapy appointment this week.   Specific goals from treatment plan addressed this week:  Patient discussed feeling intense emotions related to his relationship with his father, patient reported he has been attempting to utilize distress tolerance skills before engaging in self-harm.   Effectiveness of strategies:  Staff to continue to monitor and coordinate care with therapist, strategies appear effective.    Dimension #4 - Readiness for Change - Risk 0. Patient verbalizes that he feels that he is here on his own regard, despite being recommended by his psychiatrist. Patient does verbalize that he believes his use is problematic, and that he wants help to change at this time. Patient verbalizes willingness to follow all recommendations made for him while in treatment. Patient appears genuinely motivated for treatment and change at this time.  Specific goals from treatment plan addressed this week:  Patient attended 3/3 groups this week and scheduled 1:1.   Effectiveness of strategies:  Strategies appear effective.   Dimension #5 - Relapse Potential - Risk 3. Patient reports regular use of alcohol up until 6/5/19, and so he therefore may currently lack the necessary coping skills to help him prevent relapse at this  time. He indicated that he struggles with impulse control which could indicate an increased risk of relapse at this time. Patient does report some past treatment experience and a history of periods of sustained sobriety. He therefore may have some knowledge of the skills needed to maintain sobriety, however it does not appear that he has been utilizing those skills in the past 6 months. Patient's lack of current skills, mental health, and impulse control issues appear to indicate an increased risk of relapse at this time. Patient has had 2 relapses in outpatient treatment.   Specific goals from treatment plan addressed this week:  Patient reported minimal urges and no use of substances this week.   Effectiveness of strategies:  Strategies appear effective.   Dimension #6 - Recovery Environment - Risk 2. Patient currently works full-time, and do he does appear to have some structure for his daily life. Patient struggled to identify sober hobbies, so he does appear to lack meaningful activity outside of work at this time. Patient states that he currently has a stable place to live, however it is not always the most supportive as he sometimes isolates. Patient indicated peer support for sobriety, and that he has been attending support groups, so he does have some support for his sobriety. He states that his family does not know about his use currently, and that they would not be supportive of him if they were made aware. Patient denies any current legal involvement.   Specific goals from treatment plan addressed this week:  Patient reported plan to reach out to his brother for support this week, [place] cities. Counselor and patient discussed potential family meeting, continual discussion of setting boundaries with family.   Effectiveness of strategies:  Strategies appear effective.   T) Treatment plan updated: no  Patient notified and in agreement: N/A.  Patient educated on Relapse Prevention.  Patient has completed 56 of 90 program hours at this time. Projected discharge date is 9/20/19. Current discharge plan is phase III/ program.     Elinor Pandya [facility], [facility]  7/19/2019, 4:48 PM       Weekly Educational Topics Date   1. Dual Diagnoses, week 1    2. Dual Diagnoses, week 2    3. Stress Management    4. Feelings/Emotions    5. Thinking 6/14   6. Change 6/17, 6/19, 6/21   7. Recovery Support 6/24, 6/26, 6/28   8. Relationships/Communication 7/1, 7/3, 7/5   9. Addiction 101 7/8, 7/10, 7/12   10. Relapse Prevention 7/15, 7/17, 7/19   11. Grief and Loss    12. Strengths    13. Wellness    Mindfulness  6/14, 6/21, 6/28, 7/5, 7/12, 7/19

## 2021-06-27 NOTE — PROGRESS NOTES
Progress Notes by Elinor Pandya LADC at 7/12/2019  2:54 PM     Author: Elinor Pandya LADC Service: -- Author Type: Licensed Alcohol and Drug Counselor    Filed: 7/12/2019  3:07 PM Encounter Date: 7/12/2019 Status: Attested    : Elinor Pandya LADC (Licensed Alcohol and Drug Counselor) Cosigner: Estella Coker Psy.D, BUD at 7/16/2019  3:23 PM    Attestation signed by Estella Coker Psy.D, LP at 7/16/2019  3:23 PM    I have read, discussed and agree with the documentation provided by Elinor Pandya.  EVAN, VALENCIA.  Estella Coker PsyD, ABPP, LP                    Weekly Progress Note  Cong Tuttle  1990  [id number]      D) Pt attended 4/4 groups this week with no absences. Patient attended 1 individual sessions this week on 7/10/10  A) Staff facilitated groups and reviewed tx progress. Assessed for [facility]. R) No VAP needed at this time.     Any significant events, defines as events that impact patients relationship with others inside and outside of treatment: Yes: patient was released from hospital on 6/10/19. Patient reported that another patient had requested to move into his apartment (see 1:1 appointment on 7/3/19). Patient has communicated to the other patient that she is not allowed to move in, this patient to communicate to staff if there are any more requests from the other patient.   Indicate any changes or monitoring of physical or mental health problems: Yes: patient reported SIB on 6/19/19, and 6/31/19, 7/6/19, 7/7/19, and 7/11/19. Patient reports some passive SI, no intent.   Indicate involvement by any outside supports: Yes: patient has a sponsor, psychiatrist in [place].   IAPP reviewed and modified as needed: NA    Pt working on the following dimensions:  Dimension #1 - Withdrawal Potential - Risk 0. Patient reports last use of alcohol on 6/5/19 at intake, most recent pattern of use has been 3-9 drinks 2-4x per week. Patient was not requested to complete a UA on 6/19/19  which was positive for benzodiazepines, patient is currently prescribed klonopin, did not indicate recent alcohol use. Patient reported having two glasses of wine on 6/21/19. Patient does not report or display any withdrawal symptoms at this time.   Specific goals from treatment plan addressed this week:  Patient reported drinking a beer on 7/8/19. Patient was requested to complete a UA on 7/5/19, results indicated that it was diluted. Patient was requested to complete another UA on 7/8/19, which was negative for all tested substances, including patient's prescribed benzodiazepines. These results were reviewed with patient, patient presented current medication, reported he has been taking it, and also reports that he has been drinking large amounts of fluids, with restricted eating during the day, which may be effective results.   Effectiveness of strategies:  Staff to continue to monitor.   Dimension #2 - Biomedical - Risk 0. Patient reports no current health concerns, but does report restricting his eating. Patient has medical insurance and is able to access medical care as needed.   Specific goals from treatment plan addressed this week:  Patient reported he hasn't been as consistent in eating a banana in the morning, but reported goal to restart this plan.  Patient reported no other changes to physical health.   Effectiveness of strategies:  Strategies appear effective.   Dimension #3 - Emotional/Behavioral/Cognitive - Risk 2. Patient reports mental health diagnoses of major depressive disorder, generalized anxiety disorder, and borderline personality disorder. Patient reports that he does currently have psychiatry services that he regularly attends (weekly currently). Patient denies any other current mental health services, but does verbalize a desire to obtain therapy services. Patient endorses current mental health medications. Patient was recently discharged from a stay on a mental health unit due to  suicidal ideation. Patient has a long history of struggling with suicidal ideation and attempts. Patient endorses active struggles with food restriction, as well as self-harm. Patient states that he greatly struggles with impulse control at this time, and concern of engaging more in self harm when not drinking. Patient denies any current suicidal or homicidal thoughts or plans. Patient is oriented x4.   Active interventions to stabilize mental health symptoms:  Patient reports taking medications as prescribed. Counselor checking in with patient at each appointment regarding SIB and SI, patient continues to be in agreement to follow safety plan. Patient reported minimal SI this week. Patient attended therapy appointment this week.   Specific goals from treatment plan addressed this week:  Patient reported that he self-harmed over the weekend, reported relate to his feeling of the situation with the other patient. Counselor and patient discussed being aware of what's in his control and working on boundaries with other people. Patient reported in check-in on 7/12/19 that he has had SIB during the week.   Effectiveness of strategies:  Staff to continue to monitor and coordinate care with therapist.   Dimension #4 - Readiness for Change - Risk 0. Patient verbalizes that he feels that he is here on his own regard, despite being recommended by his psychiatrist. Patient does verbalize that he believes his use is problematic, and that he wants help to change at this time. Patient verbalizes willingness to follow all recommendations made for him while in treatment. Patient appears genuinely motivated for treatment and change at this time.  Specific goals from treatment plan addressed this week:  Patient attended 4/4 groups this week and scheduled 1:1.   Effectiveness of strategies:  Strategies appear effective.   Dimension #5 - Relapse Potential - Risk 3. Patient reports regular use of alcohol up until 6/5/19, and so he  therefore may currently lack the necessary coping skills to help him prevent relapse at this time. He indicated that he struggles with impulse control which could indicate an increased risk of relapse at this time. Patient does report some past treatment experience and a history of periods of sustained sobriety. He therefore may have some knowledge of the skills needed to maintain sobriety, however it does not appear that he has been utilizing those skills in the past 6 months. Patient's lack of current skills, mental health, and impulse control issues appear to indicate an increased risk of relapse at this time. Patient has had 2 relapses in outpatient treatment.   Specific goals from treatment plan addressed this week:  Patient reported drinking a beer on 7/8/19, identified that he needs to more aware of availability of alcohol when he goes into stores. Patient reported being honest and holding himself accountable has been helpful.   Effectiveness of strategies:  Strategies appear effective.   Dimension #6 - Recovery Environment - Risk 2. Patient currently works full-time, and do he does appear to have some structure for his daily life. Patient struggled to identify sober hobbies, so he does appear to lack meaningful activity outside of work at this time. Patient states that he currently has a stable place to live, however it is not always the most supportive as he sometimes isolates. Patient indicated peer support for sobriety, and that he has been attending support groups, so he does have some support for his sobriety. He states that his family does not know about his use currently, and that they would not be supportive of him if they were made aware. Patient denies any current legal involvement.   Specific goals from treatment plan addressed this week:  Patient reported attending homegroup and reaching out to sober friends. Patient reported plan to go to [place] over the weekend, discussed with counselor  having boundaries with his father. Patient did not indicate any other interaciton with other patient this week.   Effectiveness of strategies:  Strategies appear effective.   T) Treatment plan updated: no  Patient notified and in agreement: N/A.  Patient educated on Addiction 101. Patient has completed 46 of 90 program hours at this time. Projected discharge date is 9/20/19. Current discharge plan is phase III/ program.     Elinor Pandya [facility], [facility]  7/12/2019, 3:07 PM       Weekly Educational Topics Date   1. Dual Diagnoses, week 1    2. Dual Diagnoses, week 2    3. Stress Management    4. Feelings/Emotions    5. Thinking 6/14   6. Change 6/17, 6/19, 6/21   7. Recovery Support 6/24, 6/26, 6/28   8. Relationships/Communication 7/1, 7/3, 7/5   9. Addiction 101 7/8, 7/10, 7/12   10. Relapse Prevention    11. Grief and Loss    12. Strengths    13. Wellness    Mindfulness  6/14, 6/21, 6/28, 7/5, 7/12

## 2021-06-27 NOTE — PROGRESS NOTES
Progress Notes by Elinor Pandya LADC at 7/1/2019  3:11 PM     Author: Elinor Pandya LADC Service: -- Author Type: Licensed Alcohol and Drug Counselor    Filed: 7/1/2019  3:26 PM Encounter Date: 7/1/2019 Status: Attested    : Elinor Pandya LADC (Licensed Alcohol and Drug Counselor) Cosigner: Estella Coker Psy.D, BUD at 7/2/2019 11:45 PM    Attestation signed by Estella Coker Psy.D, LP at 7/2/2019 11:45 PM    I have read, discussed and agree with the documentation provided by Elinor Pandya.  MPS, Mental Health Intern.  Estella Coker PsyD, ABPP, LP  ,                Weekly Progress Note  Cong Tuttle  1990  [id number]      D) Pt attended 3/3 groups this week with no absences. Patient attended 1 individual sessions this week on 6/27/19  A) Staff facilitated groups and reviewed tx progress. Assessed for [facility]. R) No VAP needed at this time.     Any significant events, defines as events that impact patients relationship with others inside and outside of treatment: Yes: patient was released from hospital on 6/10/19.   Indicate any changes or monitoring of physical or mental health problems: Yes: patient reported SIB on 6/19/19, and passive SI this week. Patient reports no SIB this week, no SI.   Indicate involvement by any outside supports: Yes: patient has a sponsor, psychiatrist in [place].   IAPP reviewed and modified as needed: NA    Pt working on the following dimensions:  Dimension #1 - Withdrawal Potential - Risk 0. Patient reports last use of alcohol on 6/5/19 at intake, most recent pattern of use has been 3-9 drinks 2-4x per week. Patient was not requested to complete a UA on 6/19/19 which was positive for benzodiazepines, patient is currently prescribed klonopin, did not indicate recent alcohol use. Patient does not report or display any withdrawal symptoms at this time.   Specific goals from treatment plan addressed this week:  Patient reported having two glasses of wine on  6/21/19. Patient was not requested to complete a UA this week.   Effectiveness of strategies:  Strategies appear effective.   Dimension #2 - Biomedical - Risk 0. Patient reports no current health concerns, but does report restricting his eating. Patient has medical insurance and is able to access medical care as needed.   Specific goals from treatment plan addressed this week:  Patient reported he has been eating a banana daily this past week and that it has been going well. Patient discussed he may consider not weighing himself daily. Patient reported no other changes to health.   Effectiveness of strategies:  Strategies appear effective.   Dimension #3 - Emotional/Behavioral/Cognitive - Risk 2. Patient reports mental health diagnoses of major depressive disorder, generalized anxiety disorder, and borderline personality disorder. Patient reports that he does currently have psychiatry services that he regularly attends (weekly currently). Patient denies any other current mental health services, but does verbalize a desire to obtain therapy services. Patient endorses current mental health medications. Patient was recently discharged from a stay on a mental health unit due to suicidal ideation. Patient has a long history of struggling with suicidal ideation and attempts. Patient endorses active struggles with food restriction, as well as self-harm. Patient states that he greatly struggles with impulse control at this time, and concern of engaging more in self harm when not drinking. Patient denies any current suicidal or homicidal thoughts or plans. Patient is oriented x4.   Active interventions to stabilize mental health symptoms:  Patient reports taking medications as prescribed. Counselor checking in with patient at each appointment regarding SIB and SI, patient signed suicide safety prevention plan on 6/21/19. Patient reported no SI this week, some urges to self-harm.   Patient reported psychiatry appointment on  6/29/19.   Specific goals from treatment plan addressed this week:  Patient reported attending therapy session and reported plan to continue with therapist. Counselor and patient discussed using self-soothe techniques to cope with SIB urges, patient reported having significant urges to self-harm on 6/28/19.  Effectiveness of strategies:  Strategies appear effective.   Dimension #4 - Readiness for Change - Risk 0. Patient verbalizes that he feels that he is here on his own regard, despite being recommended by his psychiatrist. Patient does verbalize that he believes his use is problematic, and that he wants help to change at this time. Patient verbalizes willingness to follow all recommendations made for him while in treatment. Patient appears genuinely motivated for treatment and change at this time.  Specific goals from treatment plan addressed this week:  Patient attended 3/3 groups this week and scheduled 1:1.   Effectiveness of strategies:  Strategies appear effective.   Dimension #5 - Relapse Potential - Risk 3. Patient reports regular use of alcohol up until 6/5/19, and so he therefore may currently lack the necessary coping skills to help him prevent relapse at this time. He indicated that he struggles with impulse control which could indicate an increased risk of relapse at this time. Patient does report some past treatment experience and a history of periods of sustained sobriety. He therefore may have some knowledge of the skills needed to maintain sobriety, however it does not appear that he has been utilizing those skills in the past 6 months. Patient's lack of current skills, mental health, and impulse control issues appear to indicate an increased risk of relapse at this time. Patient has had 1 relapse in outpatient treatment.   Specific goals from treatment plan addressed this week:  Patient reported relapse on 6/21/19, reported calling his sponsor afterwards and that he told his mother about his  drinking over the past several months. Patient discussed with counselor plan to go to a friend's house if his parents ended up drinking when he was home.     Effectiveness of strategies:  Strategies appear effective.   Dimension #6 - Recovery Environment - Risk 2. Patient currently works full-time, and do he does appear to have some structure for his daily life. Patient struggled to identify sober hobbies, so he does appear to lack meaningful activity outside of work at this time. Patient states that he currently has a stable place to live, however it is not always the most supportive as he sometimes isolates. Patient indicated peer support for sobriety, and that he has been attending support groups, so he does have some support for his sobriety. He states that his family does not know about his use currently, and that they would not be supportive of him if they were made aware. Patient denies any current legal involvement.   Specific goals from treatment plan addressed this week:  Patient reported that he told his parents that he needs them to not drink around him and that if they do, he is willing to remove himself from the situation. Patient reported attending a meeting this week, discussed some of his hesitancies in going to other meetings or attending social events. Patient reports continuing to talk to his sponsor daily.   Effectiveness of strategies:  Strategies appear effective.   T) Treatment plan updated: no  Patient notified and in agreement: N/A.  Patient educated on Recovery Support. Patient has completed 25 of 90 program hours at this time. Projected discharge date is 9/20/19. Current discharge plan is phase III/ program.     EVAN Blunt, [facility]  7/1/2019, 3:18 PM       Weekly Educational Topics Date   1. Dual Diagnoses, week 1    2. Dual Diagnoses, week 2    3. Stress Management    4. Feelings/Emotions    5. Thinking 6/14   6. Change 6/17, 6/19, 6/21   7. Recovery Support 6/24, 6/26, 6/28    8. Relationships/Communication    9. Addiction 101    10. Relapse Prevention    11. Grief and Loss    12. Strengths    13. Wellness    Mindfulness  6/14, 6/21, 6/28

## 2021-06-28 NOTE — PROGRESS NOTES
Progress Notes by Elinor Pandya LADC at 2020  3:02 PM     Author: Elinor Pandya LADC Service: -- Author Type: Licensed Alcohol and Drug Counselor    Filed: 2020  4:10 PM Encounter Date: 2020 Status: Attested    : Elinor Pandya LADC (Licensed Alcohol and Drug Counselor) Cosigner: Estella Coker Psy.D, LP at 2020  5:08 PM    Attestation signed by Estella Coker Psy.D, LP at 2020  5:08 PM    I have read, discussed and agree with the documentation provided by EVAN Blunt LADC.  Estella Coker PsyD, ABPP, LP                    Outpatient Mental Health Treatment Plan    This is a dual signature report.  My supervising clinician is Estella Coker PsyD, ABPP, LP.    Name:  Cong Tuttle  :  1990  MRN:  [id number]    Treatment Plan:  Updated Treatment Plan  Intake/initial treatment plan date:  2019  Benefit and risks and alternatives have been discussed: Yes  Is this treatment appropriate with minimal intrusion/restrictions: Yes  Estimated duration of treatment:  Ongoing therapy at this time  Anticipated frequency of services:  Weekly  Necessity for frequency: This frequency is needed to establish therapeutic goals and for continuity of care in order to monitor progress.  Necessity for treatment: To address cognitive, behavioral, and/or emotional barriers in order to work toward goals and to improve quality of life.    Session Type: Patient is presenting for an Individual session.    Plan:           ?   ? Anxiety    Goal:  Decrease average anxiety level from 3 to 1.   Strategies: ? [x]Learn and practice relaxation techniques and other coping strategies (e.g., thought stopping, reframing, meditation)     ? [x] Increase involvement in meaningful activities     ? [x] Discuss sleep hygiene     ? [x] Explore thoughts and expectations about self and others     ? [x] Identify and monitor triggers for panic/anxiety symptoms     ? [x] Implement physical  activity routine (with physician approval)     ? [] Consider introduction of bibliotherapy and/or videos     ? [x] Continue compliance with medical treatment plan (or explore barriers)     ?Degree to which this is a problem: 3  Degree to which goal is met: 2    Date of next review: 4/21/2020       ? Depression    Goal:  Decrease average depression level from 4 to 1.   Strategies:    ?[x] Decrease social isolation     [x] Increase involvement in meaningful activities     ?[x] Discuss sleep hygiene     ?[x] Explore thoughts and expectations about self and others     ?[x] Process grief (loss of significant person, independence, role, etc.)     ?[x] Assess for suicide risk     ?[x] Implement physical activity routine (with physician approval)     [x] Consider introduction of bibliotherapy and/or videos     [x] Continue compliance with medical treatment plan (or explore barriers)       ?  Degree to which this is a problem:3  Degree to which goal is met: 2    Date of next review: 04/21/2020    Substance use  Goal:  Maintain sobriety/reducing use.   Strategies: ? [] Consider referral for chemical dependency evaluation     ? [x] Discuss barriers to participating in AA or other peer-facilitated groups         [x] Address environmental factors which may interfere with  sobriety     ? [x] Explore short-term versus long-term consequences of use     ? [x] Continue compliance with medical treatment plan (or explore  barriers)     ?  Degree to which this is a problem: 2  Degree to which goal is met: 2    Date of next review: 4/21/2020       Functional Impairment:  1=Not at all/Rarely  2=Some days  3=Most Days  4=Every Day    Personal : 3  Family : 3  Social : 4  Work/school : 4    Diagnosis:  Major depressive disorder, recurrent, moderate; Generalized Anxiety disorder; Alcohol use disorder    WHODAS 2.0 12-item version: 38.58%      Scores presented in qualifiers to represent level of disability.  MODERATE Problem (medium, fair,  ...) 25-49%    H1= 30  H2= 15  H3= 15        Clinical assessments and measures completed:. JOSSIE-7, PHQ-9 and CAGE-AID, CSSR     Strengths/personal resources:  (what does the patient do well, what is going well in life, positive personality characteristics):  Patient reported his strengths includes running, empathetic, compassionate and caring.     Weakness:  Patient indicated his weaknesses includes boundaries, self-worth, self-esteem and being assertive.     Cultural Considerations: Patient is a 28 year old single  male.     Persons responsible for this plan: Patient and Provider            Psychotherapist Signature           Patient Signature:              Guardian Signature             Performed and documented by: EVAN Blunt, [facility]  Supervisor: Dr. Estella Coker, Yaakov, ABPP, LP  Date:  1/21/2020  Time:  4:10 PM

## 2021-06-28 NOTE — PROGRESS NOTES
Progress Notes by Elinor Pandya LADC at 10/24/2019 11:42 AM     Author: Elinor Pandya LADC Service: -- Author Type: Licensed Alcohol and Drug Counselor    Filed: 10/24/2019 11:51 AM Encounter Date: 10/24/2019 Status: Attested    : Elinor Pandya LADC (Licensed Alcohol and Drug Counselor) Cosigner: Estella Coker Psy.D, LP at 11/1/2019 11:54 PM    Attestation signed by Estella Coker Psy.D, LP at 11/1/2019 11:54 PM    I have read, discussed and agree with the documentation provided by EVAN Blunt LADC.  Estella Coker PsyD, ABPP, LP                  Weekly Progress Note  Cong Tuttle  1990  [id number]      D) Pt attended 1/1 groups this week with 0 absences, patient is in phase III. Patient attended 1 individual sessions this week on 10/22/19  A) Staff facilitated groups and reviewed tx progress. Assessed for [facility]. R) No VAP needed at this time.     Any significant events, defines as events that impact patients relationship with others inside and outside of treatment: Yes: patient was released from hospital on 6/10/19. Patient reported that another patient had requested to move into his apartment (see 1:1 appointment on 7/3/19). Patient has communicated to the other patient that she is not allowed to move in, this patient to communicate to staff if there are any more requests from the other patient. Patient reported on 8/26/19 that he had suicide attempt on 8/25/19 via strangulation (see documentation). Patient reported minimal SI this week, reports no current intent.   Indicate any changes or monitoring of physical or mental health problems: Yes: patient reports ongoing daily self harm, last reported on 10/22/19. Patient reported continued SI, reported continued commitment to following safety plan.   Indicate involvement by any outside supports: Yes: psychiatrist in [place], therapist at [facility], DBT program through [facility]. Counselor in communication with  patient's therapist regarding safety concerns. Counselor spoke to patient's psychiatrist on 10/18/19.   IAPP reviewed and modified as needed: NA    Pt working on the following dimensions:  Dimension #1 - Withdrawal Potential - Risk 0. Patient reports last use of alcohol on 6/5/19 at intake, most recent pattern of use has been 3-9 drinks 2-4x per week. Patient was not requested to complete a UA on 6/19/19 which was positive for benzodiazepines, patient is currently prescribed klonopin, did not indicate recent alcohol use. Patient reported having two glasses of wine on 6/21/19. Patient reported drinking a beer on 7/8/19. Patient was requested to complete a UA on 7/5/19, results indicated that it was diluted. Patient was requested to complete another UA on 7/8/19, which was negative for all tested substances, including patient's prescribed benzodiazepines. These results were reviewed with patient, patient presented current medication, reported he has been taking it, and also reports that he has been drinking large amounts of fluids, with restricted eating during the day, which may be effective results. Patient was requested to complete a UA on 8/5/19 which was positive for benzodiazepines, which the patient is currently prescribed. Patient contacted counselor on 10/16/19 to report he had used 1.5 bottles of cough syrup over the weekend, reported no current withdrawal symptoms.Patient does not report or display any withdrawal symptoms at this time.   Specific goals from treatment plan addressed this week:  Patient did not report any substance use this week. Patient was not requested to complete a UA this week.   Effectiveness of strategies:  Staff to continue to monitor, strategies appear effective.   Dimension #2 - Biomedical - Risk 0. Patient reports no current health concerns, but does report restricting his eating. Patient has medical insurance and is able to access medical care as needed.   Specific goals from  treatment plan addressed this week:  Patient reported no changes to physical health this week. Patient reported no changes to eating habits. Patient reports continued tobacco product use.   Effectiveness of strategies:  Strategies appear effective.   Dimension #3 - Emotional/Behavioral/Cognitive - Risk 2. Patient reports mental health diagnoses of major depressive disorder, generalized anxiety disorder, and borderline personality disorder. Patient reports that he does currently have psychiatry services that he regularly attends (weekly currently).  Patient endorses current mental health medications. Patient currently sees a therapist. Patient was recently discharged from a stay on a mental health unit due to suicidal ideation. Patient has a long history of struggling with suicidal ideation and attempts. Patient endorses active struggles with food restriction, as well as self-harm. Patient states that he greatly struggles with impulse control at this time, and concern of engaging more in self harm when not drinking. Patient is oriented x4.   Active interventions to stabilize mental health symptoms:  Patient reports taking medications as prescribed, attending weekly therapy. Patient reported attending DBT program through [facility]. Counselor checking in with patient at each appointment regarding SIB and SI, patient continues to be in agreement to follow safety plan.   Specific goals from treatment plan addressed this week:  Patient reported feeling more depressed this week, counselor encouraged patient consider what coping skills he can utilize. Patient also expressed feeling isolated, counselor challenged patient's perceptions and asked patient to look for evidence that this is true. Counselor also encouraged patient to reach out to supports.   Effectiveness of strategies:  Strategies appear effective, staff to continue to monitor.   Dimension #4 - Readiness for Change - Risk 0. Patient verbalizes that he feels that he is  "here on his own regard, despite being recommended by his psychiatrist. Patient does verbalize that he believes his use is problematic, and that he wants help to change at this time. Patient verbalizes willingness to follow all recommendations made for him while in treatment. Patient appears genuinely motivated for treatment and change at this time.  Specific goals from treatment plan addressed this week:  Patient attended 1/1 groups this week, attended 1/1 individual appointments. Patient was active during group. Patient reported he did not accomplish goal of reducing the days he self-harmed by at least 1, continuing to work on this goal.   Effectiveness of strategies:  Strategies appear effective.   Dimension #5 - Relapse Potential - Risk 2. Patient reports regular use of alcohol up until 6/5/19, and so he therefore may currently lack the necessary coping skills to help him prevent relapse at this time. Patient reports current abstinence since 7/8/19. He indicated that he struggles with impulse control which could indicate an increased risk of relapse at this time. Patient does report some past treatment experience and a history of periods of sustained sobriety. Patient's lack of current skills, mental health, and impulse control issues appear to indicate an increased risk of relapse at this time. Patient has had 3 relapses in outpatient treatment.   Specific goals from treatment plan addressed this week:  Patient reported no use this week. Patient reported ongoing urges for marijuana, reported coping by distracting himself and \"playing the tape through.\"   Effectiveness of strategies:  Strategies appear effective, staff to continue to monitor.  Dimension #6 - Recovery Environment - Risk 2. Patient currently works full-time, and do he does appear to have some structure for his daily life. Patient struggled to identify sober hobbies, so he does appear to lack meaningful activity outside of work at this time. Patient " states that he currently has a stable place to live, however it is not always the most supportive as he sometimes isolates. Patient indicated peer support for sobriety, and that he has been attending support groups, so he does have some support for his sobriety. He states that his family does not know about his use currently, and that they would not be supportive of him if they were made aware. Patient denies any current legal involvement.   Specific goals from treatment plan addressed this week:  Patient expressed that he is feeling more pressure related to missing work related to appointments. Patient reported that he ran into his old sponsor, and is considering keeping in touch with him. Counselor and patient discussed possibility of the patient attending a SUSAN meeting, was provided information.   Effectiveness of strategies:  Strategies appear effective.   T) Treatment plan updated: No.  Patient notified and in agreement: NA  Patient educated on Relationships. Patient has completed 151 of 120 program hours at this time, patient is currently in phase III, counselor and patient in agreement to extend treatment. Discharge plan is resources in the community/mental health services.     Elinor Pandya, EVAN, [facility]  10/24/2019, 11:51 AM       Weekly Educational Topics Date   1. Dual Diagnoses, week 1 8/26, 8/28, 8/30   2. Dual Diagnoses, week 2 9/4, 9/6   3. Stress Management 8/12, 8/16 8/19, 8/23   4. Feelings/Emotions 9/9, 9/11, 9/13   5. Thinking 6/14   6. Change 6/17, 6/19, 6/21   7. Recovery Support 6/24, 6/26, 6/28   8. Relationships/Communication 7/1, 7/3, 7/5   9. Addiction 101 7/8, 7/10, 7/12   10. Relapse Prevention 7/15, 7/17, 7/19   11. Grief and Loss 7/22, 7/24, 7/26   12. Strengths 7/29, 7/31, 8/2   13. Wellness 8/5, 8/7, 8/9   Mindfulness  6/14, 6/21, 6/28, 7/5, 7/12, 7/19, 7/26, 8/2, 8/9, 8/16, 8/30, 9/6, 9/13     Weekly Educational Topics Date   Boundaries    Suicidal ideation 9/24   Acceptance 10/1     Relationships 10/8, 10/22    Relapse Prevention 10/15

## 2021-06-28 NOTE — PROGRESS NOTES
Progress Notes by Elinor Pandya LADC at 1/21/2020  3:30 PM     Author: Elinor Pandya LADC Service: -- Author Type: Licensed Alcohol and Drug Counselor    Filed: 1/23/2020  3:15 PM Encounter Date: 1/21/2020 Status: Attested    : Elinor Pandya LADC (Licensed Alcohol and Drug Counselor) Cosigner: Estella Coker Psy.D, LP at 1/23/2020  5:08 PM    Attestation signed by Estella Coker Psy.D, LP at 1/23/2020  5:08 PM    I have read, discussed and agree with the documentation provided by EVAN Blunt LADC.  Estella Coker PsyD, ABPP, LP                  Mental Health Visit Note    This is a dual signature report.  My supervising clinician is Estella Coker PsyD, ABPP, LP.    Date: 1/21/20   Start time: 3:30  Stop Time: 4:15   Session # 1    Session Type: Patient is presenting for an Individual session.    Cong Tuttle is a 29 y.o. male is being seen today for    Chief Complaint   Patient presents with   ? MH Follow Up     Present For Session: Patient and therapist     New symptoms or complaints: None    Functional Impairment:   Personal: 3  Family: 3  Work: on leave  Social:3    Clinical assessment of mental status: Mr. Tuttle presented on time for scheduled session today.  He was open and cooperative, and dressed appropriately for this session and weather. His memory was good for both short and long term.  His speech and language was soft and concise throughout the session.  Concentration and focus is within normal limits. Psychosis is not noted or reported. He reports his mood is somewhat content.  Affect is congruent with speech.  Fund of knowledge is adequate. Insight is adequate for therapy. Reviewed and changes made as needed.     Suicidal/Homicidal Ideation present: Patient denied current suicidal ideations, plans and/or means. Patient agreed to call 911 and/or report to ER if suicidal ideations were to occur. Patient also agreed to follow suicide safety plan. No change  1/21/20    Patient's impression of their current status: Patient presented to appointment today and discussed that he will be completing IDP at the [facility] at the end of this week and has an intake for S day program next week. Patient discussed some issues with setting boundaries with others and asserting his needs. Patient reported he has been smoking marijuana to cope with anxiety, reported understanding concerns related to this use.  Patient identified some uncomfortableness with gaining weight, but also agreed that not engaging in ED behavior has been beneficial.     Therapist impression of patients current state: Patient appears to have fair insight into his mental health. This therapist processed with patient his boundaries, and encouraged patient to consider what he can learn from these experiences and what he wants out of relationships. This therapist expressed concerns related to patient's marijuana use due to past history of substance use and potential interaction with mental health medications. This therapist validated patient's feelings related to weight gain, and discussed ways to tolerate uncomfortableness.     Type of psychotherapeutic technique provided: Insight oriented, Client centered and CBT    Progress toward short term goals: Progress as expected with patient returning to scheduled session and continuing with ED program/transitioning to day program.     Review of long term goals: Reviewed this session, next reviewed on 4/21/20    Diagnosis:   1. Major depressive disorder, recurrent episode, moderate (H)    2. JOSSIE (generalized anxiety disorder)    3. Moderate alcohol use disorder (H)      Plan and Follow up: Patient to return for weekly therapy with this therapist until patient's therapist, SYLVIA Albarado returns from medical leave.  Patient agreed to call 911 and/or report to ER if any suicidal ideations were to occur.     Discharge Criteria/Planning: Patient will continue with  follow-up until therapy can be discontinued without return of signs and symptoms.    Performed and documented by: EVAN Blunt, [facility]  Supervisor: Dr. Estella Coker, PsBecca, ABPP, LP  Date:  1/23/2020  Time:  3:14 PM

## 2021-06-28 NOTE — PROGRESS NOTES
Progress Notes by Elinor Pandya LADC at 9/19/2019  4:17 PM     Author: Elinor Pandya LADC Service: -- Author Type: Licensed Alcohol and Drug Counselor    Filed: 9/19/2019  4:56 PM Encounter Date: 9/19/2019 Status: Attested    : Elinor Pandya LADC (Licensed Alcohol and Drug Counselor) Cosigner: Estella Coker Psy.D, BUD at 9/22/2019 10:26 AM    Attestation signed by Estella Coker Psy.D, LP at 9/22/2019 10:26 AM    I have read, discussed and agree with the documentation provided by EVAN Blunt LADC.  Estella Coker PsyD, ABPP, LP,                  Weekly Progress Note  Cong Tuttle  1990  [id number]      D) Pt attended 0/1 groups this week with 1 excused absence, patient is in phase III. Patient attended 2 individual sessions this week on 9/16/19 and 9/19/19  A) Staff facilitated groups and reviewed tx progress. Assessed for [facility]. R) No VAP needed at this time.     Any significant events, defines as events that impact patients relationship with others inside and outside of treatment: Yes: patient was released from hospital on 6/10/19. Patient reported that another patient had requested to move into his apartment (see 1:1 appointment on 7/3/19). Patient has communicated to the other patient that she is not allowed to move in, this patient to communicate to staff if there are any more requests from the other patient. Patient reported on 8/26/19 that he had suicide attempt on 8/25/19 via strangulation (see documentation).   Indicate any changes or monitoring of physical or mental health problems: Yes: patient reports ongoing self harm, last reported on 9/19/19. Patient reported continued SI, reported continued commitment to following safety plan.   Indicate involvement by any outside supports: Yes: psychiatrist in [place], therapist at [facility] clinic. Counselor in communication with patient's therapist regarding safety concerns  IAPP reviewed and modified as needed:  NA    Pt working on the following dimensions:  Dimension #1 - Withdrawal Potential - Risk 0. Patient reports last use of alcohol on 6/5/19 at intake, most recent pattern of use has been 3-9 drinks 2-4x per week. Patient was not requested to complete a UA on 6/19/19 which was positive for benzodiazepines, patient is currently prescribed klonopin, did not indicate recent alcohol use. Patient reported having two glasses of wine on 6/21/19. Patient reported drinking a beer on 7/8/19. Patient was requested to complete a UA on 7/5/19, results indicated that it was diluted. Patient was requested to complete another UA on 7/8/19, which was negative for all tested substances, including patient's prescribed benzodiazepines. These results were reviewed with patient, patient presented current medication, reported he has been taking it, and also reports that he has been drinking large amounts of fluids, with restricted eating during the day, which may be effective results. Patient was requested to complete a UA on 8/5/19 which was positive for benzodiazepines, which the patient is currently prescribed. Patient does not report or display any withdrawal symptoms at this time.   Specific goals from treatment plan addressed this week:  Patient reported no substance use this week. Patient was not requested to complete a UA this week.   Effectiveness of strategies:  Strategies appear effective.   Dimension #2 - Biomedical - Risk 0. Patient reports no current health concerns, but does report restricting his eating. Patient has medical insurance and is able to access medical care as needed.   Specific goals from treatment plan addressed this week:  Patient reported no changes to physical health this week. Patient reports continued tobacco product use.   Effectiveness of strategies:  Strategies appear effective.   Dimension #3 - Emotional/Behavioral/Cognitive - Risk 2. Patient reports mental health diagnoses of major depressive disorder,  generalized anxiety disorder, and borderline personality disorder. Patient reports that he does currently have psychiatry services that he regularly attends (weekly currently).  Patient endorses current mental health medications. Patient currently sees a therapist. Patient was recently discharged from a stay on a mental health unit due to suicidal ideation. Patient has a long history of struggling with suicidal ideation and attempts. Patient endorses active struggles with food restriction, as well as self-harm. Patient states that he greatly struggles with impulse control at this time, and concern of engaging more in self harm when not drinking. Patient is oriented x4.   Active interventions to stabilize mental health symptoms:  Patient reports taking medications as prescribed, attending weekly therapy. Counselor checking in with patient at each appointment regarding SIB and SI, patient continues to be in agreement to follow safety plan. Patient reported psychiatry appointment scheduled for 9/20/19.   Specific goals from treatment plan addressed this week:  Patient reported completing intake for DBT on 9/17/19, and has appointments scheduled for individual therapy on 9/23/19 and group starting on 9/26/19. Patient identified feeling less depressed this week, reported that he has attempted to be more active.   Effectiveness of strategies:  Strategies appear effective, staff to continue to monitor.   Dimension #4 - Readiness for Change - Risk 0. Patient verbalizes that he feels that he is here on his own regard, despite being recommended by his psychiatrist. Patient does verbalize that he believes his use is problematic, and that he wants help to change at this time. Patient verbalizes willingness to follow all recommendations made for him while in treatment. Patient appears genuinely motivated for treatment and change at this time.  Specific goals from treatment plan addressed this week:  Patient attended 0/1 groups  this week due to DBT appointment during group time, attended 2/2 individual appointments.   Effectiveness of strategies:  Strategies appear effective.   Dimension #5 - Relapse Potential - Risk 2. Patient reports regular use of alcohol up until 6/5/19, and so he therefore may currently lack the necessary coping skills to help him prevent relapse at this time. Patient reports current abstinence since 7/8/19. He indicated that he struggles with impulse control which could indicate an increased risk of relapse at this time. Patient does report some past treatment experience and a history of periods of sustained sobriety. He therefore may have some knowledge of the skills needed to maintain sobriety, however it does not appear that he has been utilizing those skills in the past 6 months. Patient's lack of current skills, mental health, and impulse control issues appear to indicate an increased risk of relapse at this time. Patient has had 2 relapses in outpatient treatment.   Specific goals from treatment plan addressed this week:  Patient reported he has been thinking about using marijuana again, processed with counselor potential risk for using again. Patient reported no use this week.   Effectiveness of strategies:  Strategies appear effective.   Dimension #6 - Recovery Environment - Risk 2. Patient currently works full-time, and do he does appear to have some structure for his daily life. Patient struggled to identify sober hobbies, so he does appear to lack meaningful activity outside of work at this time. Patient states that he currently has a stable place to live, however it is not always the most supportive as he sometimes isolates. Patient indicated peer support for sobriety, and that he has been attending support groups, so he does have some support for his sobriety. He states that his family does not know about his use currently, and that they would not be supportive of him if they were made aware. Patient  denies any current legal involvement.   Specific goals from treatment plan addressed this week:  Patient reports continuing to work full-time. Patient reported that he was able to spend time with friends over the past weekend. Patient reported no recent recovery group attendance.   Effectiveness of strategies:  Strategies appear effective.   T) Treatment plan updated: yes  Patient notified and in agreement: Patient to sign on 9/24/19.   Patient educated on Thinking. Patient has completed 136 of 120 program hours at this time, patient is currently in phase III, counselor and patient in agreement to extend treatment. Discharge plan is resources in the community/mental health services.     EVAN Blunt, [facility]  9/19/2019, 4:56 PM       Weekly Educational Topics Date   1. Dual Diagnoses, week 1 8/26, 8/28, 8/30   2. Dual Diagnoses, week 2 9/4, 9/6   3. Stress Management 8/12, 8/16 8/19, 8/23   4. Feelings/Emotions 9/9, 9/11, 9/13   5. Thinking 6/14   6. Change 6/17, 6/19, 6/21   7. Recovery Support 6/24, 6/26, 6/28   8. Relationships/Communication 7/1, 7/3, 7/5   9. Addiction 101 7/8, 7/10, 7/12   10. Relapse Prevention 7/15, 7/17, 7/19   11. Grief and Loss 7/22, 7/24, 7/26   12. Strengths 7/29, 7/31, 8/2   13. Wellness 8/5, 8/7, 8/9   Mindfulness  6/14, 6/21, 6/28, 7/5, 7/12, 7/19, 7/26, 8/2, 8/9, 8/16, 8/30, 9/6, 9/13

## 2021-06-28 NOTE — PROGRESS NOTES
Progress Notes by Elinor Pandya LADC at 3/3/2020  3:30 PM     Author: Elinor Pandya LADC Service: -- Author Type: Licensed Alcohol and Drug Counselor    Filed: 3/3/2020  4:49 PM Encounter Date: 3/3/2020 Status: Attested    : Elinor Pandya LADC (Licensed Alcohol and Drug Counselor) Cosigner: Estella Coker Psy.D, LP at 3/10/2020 11:14 AM    Attestation signed by Estella Coker Psy.D, LP at 3/10/2020 11:14 AM    Estella Coker Psy.D., ARMIN, KIMBERLI, BUD                  Mental Health Visit Note    This is a dual signature report.  My supervising clinician is Estella Coker PsyD, ABPP, LP.    Date: 3/3/20   Start time: 3:30  Stop Time: 4:15   Session # 3    Session Type: Patient is presenting for an Individual session.    Cong Tuttle is a 29 y.o. male is being seen today for    Chief Complaint   Patient presents with   ? MH Follow Up     Present For Session: Patient and therapist     New symptoms or complaints: None    Functional Impairment:   Personal: 3  Family: 3  Work: on leave  Social:3  No change on 3/3/20    Clinical assessment of mental status: Mr. Tuttle presented on time for scheduled session today.  He was open and cooperative, and dressed appropriately for this session and weather. His memory was good for both short and long term.  His speech and language was soft and concise throughout the session.  Concentration and focus is within normal limits. Psychosis is not noted or reported. He reports his mood is depressed, but less depressed than last week.  Affect is congruent with speech.  Fund of knowledge is adequate. Insight is adequate for therapy. Reviewed and changes made as needed.     Suicidal/Homicidal Ideation present: Patient denied current suicidal ideations, plans and/or means. Patient agreed to call 911 and/or report to ER if suicidal ideations were to occur. Patient also agreed to follow suicide safety plan. No change 3/3/20.     Patient's impression of their current  status: Patient presented to appointment today and updated therapist on the past month, reported that he is still waiting to start [facility] program. Patient reported little daily structure, reported difficulty finding motivation to do things. Patient discussed his discomfort with increasing weight, reported that he has used marijuana and alcohol to cope with these feelings. Patient identified that he feels ambivalence regarding multiple areas of his life, reported willingness to attempt skills to address mental health symptoms.     Therapist impression of patients current state: Patient appears to have some insight into his mental health. This therapist discussed that the patient's limited structure is likely reinforcing his depressive symptoms, encouraged patient to consider small goals and actitivies that he can set daily to increase structure. This therapist validated the patient's discomfort, encouraged patient to consider what skills he can be utilizing, also normalized patient's ambivalence. This therapist recommended patient continue to follow recommendation to DBT/IDDT day program at [facility] and to re-assess at a later time if [facility] treatment would be beneficial.     Type of psychotherapeutic technique provided: Insight oriented, Client centered, Solution-focused and CBT    Progress toward short term goals: Progress as expected, patient continuing to work on challenging his thinking, using CBT skills.     Review of long term goals: not reviewed this session, next reviewed on 4/21/20    Diagnosis:   1. Major depressive disorder, recurrent episode, moderate (H)    2. JOSSIE (generalized anxiety disorder)    3. Moderate alcohol use disorder (H)      Plan and Follow up: Patient to return for weekly therapy with SYLVIA Albarado on 3/10/20.  Patient agreed to call 911 and/or report to ER if any suicidal ideations were to occur.     Discharge Criteria/Planning: Patient will continue with follow-up until therapy can be  discontinued without return of signs and symptoms.    Performed and documented by: EVAN Blunt, [facility]  Supervisor: Dr. Estella Coker, PsBecca, ABPP, LP  Date:  3/3/2020  Time:  4:48 PM

## 2021-06-28 NOTE — PROGRESS NOTES
Progress Notes by Elinor Pandya LADC at 12/31/2019  3:30 PM     Author: Elinor Pandya LADC Service: -- Author Type: Licensed Alcohol and Drug Counselor    Filed: 1/5/2020  1:54 PM Encounter Date: 12/31/2019 Status: Attested    : Elinor Pandya LADC (Licensed Alcohol and Drug Counselor) Cosigner: Estella Coker Psy.D, LP at 1/8/2020  8:36 AM    Attestation signed by Estella Coker Psy.D, LP at 1/8/2020  8:36 AM    I have read, discussed and agree with the documentation provided by EVAN Blunt LADC.  Estella Coker PsyD, ABPP, LP                  Mental Health Visit Note    This is a dual signature report.  My supervising clinician is Estella Coker PsyD, ABPP, LP.    12/31/19   Start time: 3:30  Stop Time: 4:20   Session # 19    Session Type: Patient is presenting for an Individual session.    Cong Tuttle is a 29 y.o. male is being seen today for    Chief Complaint   Patient presents with   ? MH Follow Up     Present For Session: Patient and therapist     New symptoms or complaints: None    Functional Impairment:   Personal: 3  Family: 3  Work: on leave  Social:3    Clinical assessment of mental status: Mr. Tuttle presented on time for scheduled session today.  He was open and cooperative, and dressed appropriately for this session and weather. His memory was good for both short and long term.  His speech and language was soft and concise throughout the session.  Concentration and focus is within normal limits. Psychosis is not noted or reported. He reports his mood is depressed.  Affect is congruent with speech.  Fund of knowledge is adequate. Insight is adequate for therapy. Reviewed and changes made as needed.     Suicidal/Homicidal Ideation present: Patient denied current suicidal ideations, plans and/or means. Patient agreed to call 911 and/or report to ER if suicidal ideations were to occur. Patient also agreed to follow suicide safety plan.     Patient's impression of  their current status: Patient presented to appointment today and discussed completing residential treatment at [facility] and that he is currently in intensive day treatment. Patient reported that he has been recommended to move into a sober house which he is relunctant to follow this recommendation. Patient also reported a recent relapse with alcohol which resulted in losing his car. Patient identified feeling proud of his progress, but also feeling that things aren't improving.     Therapist impression of patients current state: Patient appears to have fair insight into his mental health. This therapist processed with patient his reluctance to moving to sober house, discussed potential benefits to having more structure and support. This therapist encouraged patient regarding the progress that he has made, validated patient's emotions regarding progress.     Type of psychotherapeutic technique provided: Insight oriented, Client centered and CBT    Progress toward short term goals: Progress as expected with patient returning to scheduled session and continuing with ED program.     Review of long term goals: Treatment Plan Updated on 10/25/2019    Diagnosis:   1. Major depressive disorder, recurrent episode, moderate (H)    2. JOSSIE (generalized anxiety disorder)    3. Moderate alcohol use disorder (H)      Plan and Follow up: Patient to return for weekly therapy with this therapist until patient's therapist, SYLVIA Albarado returns from medical leave.  Patient agreed to call 911 and/or report to ER if any suicidal ideations were to occur.     Discharge Criteria/Planning: Patient will continue with follow-up until therapy can be discontinued without return of signs and symptoms.    Performed and documented by: Elinor Pandya, MPS, LADC  Supervisor: Dr. Estella Coker, [place], ABPP, LP  Date:  1/5/2020  Time:  1:54 PM

## 2021-06-28 NOTE — PROGRESS NOTES
Progress Notes by Elinor Pandya LADC at 10/3/2019  1:03 PM     Author: Elinor Pandya LADC Service: -- Author Type: Licensed Alcohol and Drug Counselor    Filed: 10/3/2019  1:13 PM Encounter Date: 10/3/2019 Status: Attested    : Elinor Pandya LADC (Licensed Alcohol and Drug Counselor) Cosigner: Estella Coker Psy.D, LP at 10/7/2019 11:32 AM    Attestation signed by Estella Coker Psy.D, LP at 10/7/2019 11:32 AM    I have read, discussed and agree with the documentation provided by EVAN Blunt LADC.  Estella Coker PsyD, ABPP, LP                  Weekly Progress Note  Cong Tuttle  1990  [id number]      D) Pt attended 1/1 groups this week with 0 absences, patient is in phase III. Patient attended 1 individual sessions this week on 10/1/19.  A) Staff facilitated groups and reviewed tx progress. Assessed for [facility]. R) No VAP needed at this time.     Any significant events, defines as events that impact patients relationship with others inside and outside of treatment: Yes: patient was released from hospital on 6/10/19. Patient reported that another patient had requested to move into his apartment (see 1:1 appointment on 7/3/19). Patient has communicated to the other patient that she is not allowed to move in, this patient to communicate to staff if there are any more requests from the other patient. Patient reported on 8/26/19 that he had suicide attempt on 8/25/19 via strangulation (see documentation). Patient reported urge for suicide on 9/30/19, reports no current intent.   Indicate any changes or monitoring of physical or mental health problems: Yes: patient reports ongoing daily self harm, last reported on 10/1/19. Patient reported continued SI, reported continued commitment to following safety plan.   Indicate involvement by any outside supports: Yes: psychiatrist in [place], therapist at [facility], DBT program through [facility]. Counselor in communication with  patient's therapist regarding safety concerns.  IAPP reviewed and modified as needed: NA    Pt working on the following dimensions:  Dimension #1 - Withdrawal Potential - Risk 0. Patient reports last use of alcohol on 6/5/19 at intake, most recent pattern of use has been 3-9 drinks 2-4x per week. Patient was not requested to complete a UA on 6/19/19 which was positive for benzodiazepines, patient is currently prescribed klonopin, did not indicate recent alcohol use. Patient reported having two glasses of wine on 6/21/19. Patient reported drinking a beer on 7/8/19. Patient was requested to complete a UA on 7/5/19, results indicated that it was diluted. Patient was requested to complete another UA on 7/8/19, which was negative for all tested substances, including patient's prescribed benzodiazepines. These results were reviewed with patient, patient presented current medication, reported he has been taking it, and also reports that he has been drinking large amounts of fluids, with restricted eating during the day, which may be effective results. Patient was requested to complete a UA on 8/5/19 which was positive for benzodiazepines, which the patient is currently prescribed. Patient does not report or display any withdrawal symptoms at this time.   Specific goals from treatment plan addressed this week:  Patient reported no substance use this week. Patient was not requested to complete a UA this week.   Effectiveness of strategies:  Strategies appear effective.   Dimension #2 - Biomedical - Risk 0. Patient reports no current health concerns, but does report restricting his eating. Patient has medical insurance and is able to access medical care as needed.   Specific goals from treatment plan addressed this week:  Patient reported no changes to physical health this week. Patient reported that he has been referred to the [facility] for evaluation, reports continued fasting during the day and only eating after  "exercising and self-harm. Patient reports continued tobacco product use.   Effectiveness of strategies:  Strategies appear effective.   Dimension #3 - Emotional/Behavioral/Cognitive - Risk 2. Patient reports mental health diagnoses of major depressive disorder, generalized anxiety disorder, and borderline personality disorder. Patient reports that he does currently have psychiatry services that he regularly attends (weekly currently).  Patient endorses current mental health medications. Patient currently sees a therapist. Patient was recently discharged from a stay on a mental health unit due to suicidal ideation. Patient has a long history of struggling with suicidal ideation and attempts. Patient endorses active struggles with food restriction, as well as self-harm. Patient states that he greatly struggles with impulse control at this time, and concern of engaging more in self harm when not drinking. Patient is oriented x4.   Active interventions to stabilize mental health symptoms:  Patient reports taking medications as prescribed, attending weekly therapy. Patient reported attending DBT program through [facility]. Counselor checking in with patient at each appointment regarding SIB and SI, patient continues to be in agreement to follow safety plan.   Specific goals from treatment plan addressed this week:  Patient discussed his frustrations with recommendation to the [facility], but does endorse continued restrictive behavior. Patient discussed that he is beginning to feel \"tired\" of self-harm but has not made any changes to the frequency. Patient reported feeling the DBT program is beneficial to him. Patient reported using DBT skill of TIP by taking cold showers, and reports finding it somewhat effective. Patient reported some suicidal urges when in a tall building on 9/30/19, but reports no intent, plan or means, willing to follow safety plan.   Effectiveness of strategies:  Strategies appear effective, staff to " continue to monitor.   Dimension #4 - Readiness for Change - Risk 0. Patient verbalizes that he feels that he is here on his own regard, despite being recommended by his psychiatrist. Patient does verbalize that he believes his use is problematic, and that he wants help to change at this time. Patient verbalizes willingness to follow all recommendations made for him while in treatment. Patient appears genuinely motivated for treatment and change at this time.  Specific goals from treatment plan addressed this week:  Patient attended 1/1 groups this week, attended 1/1 individual appointments. Patient was active during group. Patient reported he did not accomplish goal of SIB only 6 days out of the week instead of 7, reports plan to continue to work on this goal. Patient and counselor discuss plan to determine how much longer patient is in [facility] treatment programming.   Effectiveness of strategies:  Strategies appear effective.   Dimension #5 - Relapse Potential - Risk 2. Patient reports regular use of alcohol up until 6/5/19, and so he therefore may currently lack the necessary coping skills to help him prevent relapse at this time. Patient reports current abstinence since 7/8/19. He indicated that he struggles with impulse control which could indicate an increased risk of relapse at this time. Patient does report some past treatment experience and a history of periods of sustained sobriety. He therefore may have some knowledge of the skills needed to maintain sobriety, however it does not appear that he has been utilizing those skills in the past 6 months. Patient's lack of current skills, mental health, and impulse control issues appear to indicate an increased risk of relapse at this time. Patient has had 2 relapses in outpatient treatment.   Specific goals from treatment plan addressed this week:  Patient reported continued urges for marijuana use. Patient reported feeling confident about not using, reports he is  proud to be almost 3 months sober from all substances.   Effectiveness of strategies:  Strategies appear effective.   Dimension #6 - Recovery Environment - Risk 2. Patient currently works full-time, and do he does appear to have some structure for his daily life. Patient struggled to identify sober hobbies, so he does appear to lack meaningful activity outside of work at this time. Patient states that he currently has a stable place to live, however it is not always the most supportive as he sometimes isolates. Patient indicated peer support for sobriety, and that he has been attending support groups, so he does have some support for his sobriety. He states that his family does not know about his use currently, and that they would not be supportive of him if they were made aware. Patient denies any current legal involvement.   Specific goals from treatment plan addressed this week:  Patient reports continuing to work full-time, reported some conflict related to the frequency that he is out of work due to appointments. Patient reported continuing to take his dog for walks, also reported accomplishing goal from DBT of making a new friend.   Effectiveness of strategies:  Strategies appear effective.   T) Treatment plan updated: No.  Patient notified and in agreement: NA  Patient educated on Suicidal Ideation. Patient has completed 139 of 120 program hours at this time, patient is currently in phase III, counselor and patient in agreement to extend treatment. Discharge plan is resources in the community/mental health services.     EVAN Blunt, [facility]  10/3/2019, 1:13 PM       Weekly Educational Topics Date   1. Dual Diagnoses, week 1 8/26, 8/28, 8/30   2. Dual Diagnoses, week 2 9/4, 9/6   3. Stress Management 8/12, 8/16 8/19, 8/23   4. Feelings/Emotions 9/9, 9/11, 9/13   5. Thinking 6/14   6. Change 6/17, 6/19, 6/21   7. Recovery Support 6/24, 6/26, 6/28   8. Relationships/Communication 7/1, 7/3, 7/5   9.  Addiction 101 7/8, 7/10, 7/12   10. Relapse Prevention 7/15, 7/17, 7/19   11. Grief and Loss 7/22, 7/24, 7/26   12. Strengths 7/29, 7/31, 8/2   13. Wellness 8/5, 8/7, 8/9   Mindfulness  6/14, 6/21, 6/28, 7/5, 7/12, 7/19, 7/26, 8/2, 8/9, 8/16, 8/30, 9/6, 9/13     Weekly Educational Topics Date   Boundaries    Suicidal ideation 9/24   Acceptance 10/1

## 2021-07-04 NOTE — PROGRESS NOTES
"Rule 31 by Shena Gay LADC at 2019 10:30 AM     Author: Shena Gay LADC Service: -- Author Type: Licensed Alcohol and Drug Counselor    Filed: 2019  9:28 AM Encounter Date: 2019 Status: Signed    : Shena Gay LADC (Licensed Alcohol and Drug Counselor)         [facility] Assessment   Date: 2019        : VALENCIA Kumar    Name: Cong Tuttle  Address: [address] [place] [id number]  Phone: [phone] (home)   Referral Source: Psychiatrist   : 1990  Age: 28 y.o.  Race/Ethnicity: White or   Marital Status: Single  Employment: Full-time @ [employer];                                                                                                                        Level of Education: bachelor's degree   Socio-economic (yearly Income) Status: $4300/month  Sexual Orientation: bisexual     Last 4 digits of Social Security: 9631     Is assistance required in the ability to read and understand written material?   no    Reason for seeking services:    Patient states that he is here for treatment because;   \"I do not feel good about myself, and I use unhealthy coping skills, one of which is drinking and drugs, that with having suicidal thoughts makes me more impulsive.\"    Dimension I Acute intoxication/Withdrawal Potential:    Symptomology (past 12 months, check all that apply)  increased tolerance, passing out, binges, weekly intoxication, blackouts, secretive use, using alone, mood swings, loss of control and family history of addiction    Observed or reported (withdrawal symptoms, check all that apply)  none reported or displayed    Chemical use most recent 12 months outside a facility and other significant use history (client self-report)  Primary Drug Used  Age of First Use  Most Recent Pattern of Use and Duration    Date of last use  Time if substance use in the last 30 days Withdrawal Potential? Requiring special " care  Method of use   (oral, smoked, snort, IV, etc)    Alcohol  16 2-4x per week. 3-9 drinks per time 19 11PM None Oral   Marijuana/Hashish   Denies use in the past year       Cocaine/Crack   Denies use in the past year       Meth/Amphetamines   Denies       Heroin   Denies use in the past year       Other Opiates/Synthetics   Denies use in the past year       Inhalants   Denies       Benzodiazepines   Klonopin- AS PRESCRIBED 19 0700     Hallucinogens   Denies use in the past year       Barbiturates/Sedatives/Hypnotics   Denies       Over-the-Counter Drugs  28 Robitussin. December; about 6x in December. 1 bottle per time.   2018   Oral   Other   Denies       Nicotine  17 JUUL throughout the day.  Up to a pack per day. (primarily uses JUUL)  19 1000  Smoking       Dimension I Risk Ratin  Reason Risk Rating Assigned: Patient reports weekly alcohol use, with his last use being on 18. Patient did recently get discharged from a mental health unit, and so was monitored for potential withdrawal while there. Patient denies any current withdrawal symptoms or concerns. Patient shows no physical signs or symptoms of intoxication or withdrawal. Patient may be at some risk of withdrawal if he discontinues his anti-anxiety medication. Patient does not appear to be at risk of severe withdrawal at this time. Patient is oriented x4.         Dimension II Biomedical Conditions:    Any known health conditions: No    Ever previously treated/diagnosed with any eating disorder?  No- not diagnosed, but actively struggles with food restriction. Eats 1x per day currently.      List Health Concerns/Conditions Reported: Denies    Does patient indicate awareness of any association between substance use and listed health concerns/conditions? NA    Physical/Health Conditions which are associated with substance use: NA    Are Health Concerns/Conditions being treated? No    By Whom? Patient does  not currently have a PCP, and utilizes his psychiatrist or [facility] for his health concerns.     Patient Self-Reported Medications:  Medication Dosage Frequency   Klonopin 2mg   3mg AM  PM   Ambien 10mg Before bed   Effexor extended release 37.5mg AM & PM   Naltrexone 50mg AM & PM   Antabuse 250mg AM   Miralax  PRN     Are you pregnant: NA OB care received:NA CPS call needed: NA    Dimension II Risk Ratin  Reason Risk Rating Assigned: Patient denies any current health issues or concerns. He does not currently have a PCP, but does have insurance and so appears able to get his medical needs met and addressed as necessary. Patient denies any immediate medical needs or concerns. He appears to be in adequate physical health and functioning at this time.         Dimension III Emotional/Behavioral/Cognitive:    Oriented to person, place, time, situation?  Yes     Current Mental Health Services: yes - psychiatry, and would like individual therapy.     Past Hospitalization for MH or psychiatric problems: Yes    How many Hospitalizations: 4   Last Hospitalization; date and location: [facility] 19-6/10/19- SI. 2016; [facility]- suicide attempt.       Past or Current Issues with Gambling (Explain): no    Prior Treatment for Gambling: No     MH Diagnoses:    Major depressive disorder  Generalized anxiety  Borderline personality disorder    Self harm- last happened about 1 month ago. Cutting and burning. Did go to the hospital.     Psychiatrist: Dr. Ana Ngo     Clinic: [facility] in [place]      Current Psychotropic Medications:  See Dimension 2    Taking medications as prescribed:  Yes   Medications Helpful: Yes; except unsure of the Effexor    Current Suicidal Ideation: No  If yes, any plan? NA What is plan?:   Denies; Patient was hospitalized at [facility] from 19-6/10/19 due to suicidal ideation.     Previous Suicide Attempts?  Yes     Explain: Patient reports  his most recent attempt was on 16. At which time he drank Everclear and took OxyContin. Patient was hospitalized for over 1 week after this incident.      Current Homicidal Ideation: No  If yes, any plan? NA  What is plan?: Denies    Previous Homicide Attempts? No Explain: Denies    Suicidal/Homicidal Ideation in last 30 days? Yes  Explain: Patient was hospitalized at [facility] from 19-6/10/19 due to suicidal ideation.      Hazardous behavior engaged in which placed self or others in danger (i.e., operating a motor vehicle, unsafe sex, sharing needles, etc.)?     Driving  Multiple sexual partners    Family history of substance and/or mental health diagnosis/issues?  Yes  Explain:   Substance Use;  Mother- alcohol  Father- alcohol  Paternal side- alcohol  Mental Health;  Mother- depression  Brother- depression  Maternal grandmother- depression  Brother- schizophrenia     History of abuse (Physical, Emotional, Sexual)? No  Explain: Denies       Dimension III Risk Ratin  Reason Risk Rating Assigned: Patient reports mental health diagnoses of major depressive disorder, generalized anxiety disorder, and borderline personality disorder. Patient reports that he does currently have psychiatry services that he regularly attends (weekly currently). Patient denies any other current mental health services, but does verbalize a desire to obtain therapy services. Patient endorses current mental health medications. Patient was recently discharged from a stay on a mental health unit due to suicidal ideation. Patient has a long history of struggling with suicidal ideation and attempts. Patient endorses active struggles with food restriction, as well as self-harm. Patient states that he greatly struggles with impulse control at this time, and concern of engaging more in self harm when not drinking. Patient denies any current suicidal or homicidal thoughts or plans. Patient is oriented x4.         Dimension IV Readiness to  Change:    Mandated, or coerced into assessment or treatment:  No    Does client feel there is a problem:   Yes    Verbalization of need/desire to change:   Yes     Willing to follow treatment recommendations: Yes     Impression of : (Check all that apply):    cooperative and genuinely motivated    Are there any spiritual, cultural, or other special needs to be addressed for client to be successful in treatment? no        Dimension IV Risk Ratin  Reason Risk Rating Assigned: Patient verbalizes that he feels that he is here on his own regard, despite being recommended by his psychiatrist. Patient does verbalize that he believes his use is problematic, and that he wants help to change at this time. Patient verbalizes willingness to follow all recommendations made for him while in treatment. Patient appears genuinely motivated for treatment and change at this time. Patient was calm, cooperative, and appropriately behaved throughout this appointment.         Dimension V Relapse/Continued Use/Continued Problem Potential     Client age at First Treatment: 25    Lifetime # of CD Treatments:  2  List program, dates, and status of completion (within last five years): [facility] in [place] 2016- successful, [facility] in 2016-2017    Longest Period of Abstinence: 2 years and 2 weeks (7180-2596)    How did you accomplish this? Treatment, running daily, working some, very active on social media with support groups.      Circumstances which led to Relapse: 17 went to the bar and knew the  who served him a drink and he then started drinking again.     Risk Taking/Problem Behaviors Related to Use and/or Under the Influence:   Driving  Multiple sexual partners      Dimension V Risk Rating: 3  Reason Risk Rating Assigned: Patient reports regular use of alcohol up until 19, and so he therefore may currently lack the necessary coping skills to help him prevent relapse at  this time. He indicated that he struggles with impulse control which could indicate an increased risk of relapse at this time. Patient does report some past treatment experience and a history of periods of sustained sobriety. He therefore may have some knowledge of the skills needed to maintain sobriety, however it does not appear that he has been utilizing those skills in the past 6 months. Patient's lack of current skills, mental health, and impulse control issues appear to indicate an increased risk of relapse at this time.         Dimension VI Recovery Environment   Family support:  Yes/No;  currently unaware of his current use.     Peer Sober Support:  Yes    Current living circumstances:  Apartment alone    Environment supportive of recovery:  No; not always due to isolation potential.     Specific activities participating in which do not involve substance use:  Running    Specific activities participating in which do involve substance use:  Watching TV  Playing video games  Isolation    People, things that threaten recovery: yes - certain TV shows    Expected family involvement during treatment services:  None    Current Legal Involvement:  None    Legal Consequences related to use: DUI     Occupational/Academic consequences related to use: Not directly.     Current ability to function in a work and/or education setting: Yes, currently working full-time    Current support network for recovery (including community-based recovery support): Yes, at least 1x per week. Sometimes 2-3x per week. AA, and has a sponsor.     Do you belong to a [place]: No Which [place]?   [place]ide [place] reservation[place]     Dimension VI Risk Ratin Reason Risk Rating Assigned: Patient currently works full-time, and do he does appear to have some structure for his daily life. Patient struggled to identify sober hobbies, so he does appear to lack meaningful activity outside of work at this time. Patient states that he currently has a stable  place to live, however it is not always the most supportive as he sometimes isolates. Patient indicated peer support for sobriety, and that he has been attending support groups, so he does have some support for his sobriety. He states that his family does not know about his use currently, and that they would not be supportive of him if they were made aware. Patient denies any current legal involvement.           DSM-V Criteria for Substance Abuse  Instructions:  Determine whether the client currently meets the criteria for a Substance Use Disorder using the diagnostic criteria in the  DSM-V, pp. 481-589. Current means during the most recent 12 months outside a facility that controls access to substances.    Category of substance Severity ICD-10 Code/DSM V Code  Alcohol Use Disorder Mild  Moderate  Severe (F10.10) (305.00)  (F10.20) (303.90)  (F10.20) (303.90)   Cannabis Use Disorder Mild  Moderate  Severe (F12.10) (305.20)  (F12.20) (304.30)  (F12.20) (304.30)   Hallucinogen Use Disorder Mild  Moderate  Severe (F16.10) (305.30)  (F16.20) (304.50)  (F16.20) (304.50)   Inhalant Use Disorder Mild  Moderate  Severe (F18.10) (305.90)  (F18.20) (304.60)  (F18.20) (304.60)   Opioid Use Disorder Mild  Moderate  Severe (F11.10) (305.50)  (F11.20) (304.00)  (F11.20) (304.00)   Sedative, Hypnotic, or Anxiolytic Use Disorder Mild  Moderate  Severe (F13.10) (305.40)  (F13.20) (304.10)  (F13.20) (304.10)   Stimulant Related Disorders Mild              Moderate              Severe   (F15.10) (305.70) Amphetamine type substance  (F14.10) (305.60) Cocaine  (F15.10) (305.70) Other or unspecified stimulant    (F15.20) (304.40) Amphetamine type substance  (F14.20) (304.20) Cocaine  (F15.20) (304.40) Other or unspecified stimulant    (F15.20) (304.40) Amphetamine type substance  (F14.20) (304.20) Cocaine  (F15.20) (304.40) Other or unspecified stimulant   DisorderTobacco use Disorder Mild  Moderate  Severe (Z72.0) (305.1)  (F17.200)  (305.1)  (F17.200) (305.1)   Other (or unknown) Substance Use Disorder Mild  Moderate  Severe (F19.10) (305.90)  (F19.20) (304.90)  (F19.20) (304.90)     Diagnostic Impression: Alcohol Use Disorder, Moderate (F10.20)    Assessment Completed Within 3 Sessions of Admission: Yes  If NO, date assessment to be completed noted in Treatment Plan:       Signature of Counselor: VALENCIA Kumar  Date and Time of Signature: 6/13/2019, 9:27 AM

## 2021-07-04 NOTE — PROGRESS NOTES
"Rule 31 by Elinor Pandya LADC at 2019  9:00 AM     Author: Elinor Pandya LADC Service: -- Author Type: Licensed Alcohol and Drug Counselor    Filed: 2019  5:00 PM Encounter Date: 2019 Status: Attested    : Elinor aPndya LADC (Licensed Alcohol and Drug Counselor) Cosigner: Estella Coker Psy.D, LP at 2019 11:48 PM    Attestation signed by Estella Coker Psy.D, LP at 2019 11:48 PM    I have read, discussed and agree with the documentation provided by Elinor Pandya.  EVAN, Mental Health Intern.  Estella Coker PsyD, ABPP, LP                    BRIEF DIAGNOSTIC ASSESSMENT    This is a dual signature report.  My supervising clinician is Estella Coker PsyD, ABPP, LP.    Patient Name: Cong Tuttle  Patient : 1990  Patient Age: 28 y.o.  DATE OF SERVICE: 19  Start Time: 9:00am   Stop Time: 10:30am     PRESENTING PROBLEM/PERTINENT HISTORY  Cong Tuttle is a 28 y.o. male [facility] EVAN Blunt LADC to complete a diagnostic assessment as part of [facility]. Patient reports difficulty coping with mental and \"how I feel,\" reports has been a problem for around 10 years. Patient reports having 2 years of sobriety, has been drinking to cope. Patient reports recent hospitalization for suicidal ideation.      Referring Provider: N/A   Persons Present: Cong Tuttle and EVAN Blunt LADC    Patient's description of symptoms (including reason for referral: Patient reports problems with self-esteem, notes that frequently others will tell him positive things about him but he isn't able to see these things. Patient reports significant history of self-harm and notes this behavior tends to increase when he is not drinking. Patient reports burning himself with a cigarette in 4 places last night, prior last SIB over a month ago. Patient also reports difficulty in relationships, particularly " getting attached to people quickly, letting other step over boundaries with him. Patient also reports feeling lonely and isolated, frequently comparing himself to where his peers are and where he thought he would be at this point in his life. Patient reports he was hospitalized at [facility] for suicidal thoughts on 6/5/19-6/10/19, completed a chemical health assessment and was referred to [facility].     Depression symptoms: depressed mood, little appetite, some sleep difficulty (currently prescribed Ambien), significant feelings of worthlessness, difficulty concentrating and making decisions, recurrent thoughts of suicide. At time of this assessment, patient reports some passive SI, reports having a specific plan and means but denies any intent to follow this plan at this time. Patient reported first experiencing depression around age 18.   Manic symptoms: no problems reported or observed  Psychotic symptoms: no problems reported or observed  Anxiety symptoms: excessive anxiety daily, reports began around 10 years ago. Patient reports difficulty controlling these worries, reports restlessness, feeling on edge, difficulty concentrating/mind going blank, muscle tension and sleep disturbances. Patient reports his anxiety causes problems in relationships and his work particularly. Patient identifies SIB has been a way that he has managed this anxiety.   Panic symptoms:  no problems reported or observed  PTSD symptoms: no problems reported or observed, patient denies   Cognitive symptoms:  no problems reported or observed  Eating disorder symptoms: Patient reports significant concern about his physical appearance, will restrict eating larger amounts of food in the evening and exercising to compensate for calorie intake. Patient reports a desire to gain some weight, but also report anxiety when he increases his calorie intake. Patient also reports anxiety when he considers decreasing the frequency of exercise.  "Patient appears to have some insight that his compulsive behaviors related to exercising and eating are problematic, but appears ambivalent to making changes at this time.   Relevant symptoms: patient reports previous borderline personality diagnosis. Patient reports history of SIB behavior for several years, primarily cutting and burning, reports that he had to go the ED for a laceration in 04/2019 (see chart). Patient reports SIB has been a way for him to feel that his emotions are \"real,\" and often feels relieved after engaging in this behavior. Patient reports last SIB the night prior to this assessment, reports SIB has tended to increase when he is not drinking. Patient also reports ongoing SI, with multiple attempts in the past. Patient reports passive SI at the time of this assessment. Patient reports extremely low self-esteem, and frequently questions where his life is at this moment. Patient reports some compulsive behavior related to relationships, reports subscribing to multiple dating websites, and also reports compulsive sexual behaviors. Patient reports pervasive feelings of rejection from \"the opposite sex,\" and indicate feeling negatively about himself since most of his peers are currently in significant relationships.     Contributing factors to patient symptoms: Patient discusses that he had a desire to pursue a career in film, but was encouraged by his parents to go into engineering. Patient reports that not following this dream causes him distress, particularly in relation to turning 29 later this year.     Functional Impairments:(self rated by patient)   Personal: a lot   Family: a little bit  Social: a lot   Work: a little bit    Mental health history (treatment and services including information obtained in review of records): Patient reports history of 4 hospitalizations for mental health, most recently at [facility] 6/5/19-6/10/19. Patient reports prior hospitalization in 11/2016 at " "[facility] in [place] for suicide attempt. Patient reports current psychiatry services with Dr. Ngo at [facility] in [place], has had services with her for around 10 years. Patient reports current mental health medications of Klonopin, Ambien, Effexor extended release, Naltrexone, and Antabuse. Patient reports previously seeing a therapist while he was in [place], but has not established with a therapist since moving to the [facility].     Substance use history (treatment and services including information obtained in review of records): Patient reports first attending treatment at age 25, currently in his third treatment. Patient reports most recent substance of choice has been alcohol, last use on 6/5/19, with most recent pattern of use has been 3-9 drinks 2-4x per week. Patient reports having over two years of abstinence until he started to drink in 12/2018. Patient reported abusing DXM in the past year, last use in 12/2018, also reports using marijuana, cocaine, heroin/other opiates in the past, but none in the past 3 years. Patient was diagnosed with Alcohol use disorder, moderate during intake assessment on 6/12/19 with VALENCIA Kumar. Assessment available in Epic.     Family history of mental health/substance use: Patient reports both of his parents drink heavily, reports one of his siblings does not drinking and describe other as a \"normal drinker.\" Patient reports his mother and one of his brother has depression, other brother has schizophrenia, grandmother also has depression.     Current mental health providers:  Psychiatrist: yes Dr. Lombardo, [facility]  Therapist : none reported by patient  : none reported by patient  [facility]: none reported by patient   [facility] Team: none reported by patient       MENTAL STATUS EVALUATION    Appearance: [x] Well-groomed     [] Disheveled     [] Bizarre    [] Inappropriate  [] Other:    Activity Level: [x] Calm        "  [x] Tremors     [] Tics     [] Rigid    [] Vigilant      [] Agitated    [] Lethargic    [] Other:   Speech: [x] Normal        [] Slowed      [] Delayed           [] Slurred      [] Pressured   [] Echolalia    [] Repetitions     [] Mumbling      [x] Rapid          [] Excess Detail      [] Other:   Stream of Consciousness: [] Rambling            [] Inhibited            [] Blocked     [] Illogical      [] Disorganized      [] Spontaneous     [] Vague       [] Derailment [] Cause/Effect      [x] Coherent            [] Other:   Attitude to Examiner: [x] Friendly        [] Distracted     [] Defensive     [] Cooperative      [] Suspicious   [] Attentive        [x] Guarded      [] Evasive      [] Humorous    [] Hostile           [] Other:   Thought Process: [x] Intact     [] Circumstantial     [] Tangential     [] Flight of Ideas     [] Loose Associations               [] Within normal limits      [] Other:   Thought Content: [] Obsessions     [] Compulsions     [] Phobias     [] Suicidal     [] Homicidal        [x] Within normal limits     [] Other:   Hallucinations: [x] None     [] Auditory     [] Visual     [] Tactile     [] Command     [] Other:    Delusions: [x] None     [] Persecutory     [] Grandeur     [] Somatic      [] Other:   Ideas of Reference: [x]None     []Broadcasting     []Controlled     []Antisocial     []Bizarre        []Other:   Affect: [x]Appropriate     []Labile     []Expansive      []Constricted     [] Blunted     []Flat     []Discordant     []Concordant     []Other:   Mood: [] Neutral     [] Euthymic     [] Dysphoric     [] Depressed      [] Manic       [x] Anxious       [] Irritable/Agitated     [] Other:   Memory: [x] Intact     [] Impaired     [] Immediate   [] Recent    [] Remote   Judgment/Insight: [x] Intact     [] Impaired     [] Mild         []Moderate     [] Severe   Orientation: [x] Person  [x] Place          [x] Time        [x] Purpose     [] Other:   Historian: [] Poor      []  Inconsistent    [x] Reliable     [] Other:         SCREENING ASSESSMENTS  C-SSRS = High risk;   Patient reports current passive SI, with a plan and means with no intent, and was hospitalized at [facility] 6/5/19-6/10/19. Counselor and patient discussed safety plan, including counselor checking in with patient during appointment regarding SI, and discussed who the patient can contact for help. Patient reported willingness to contact his mother, this counselor or emergency services, depending on the severity of thoughts.     WHODAS total score (12 item version) = 10/48, 21%  H1= 28  H2= 7  H3= 14  Scores presented in qualifiers to represent level of disability.  NO problem - (none, absent, negligible,? ) - 0-4 %   MILD problem - (slight, low,?) - 5-24 %   MODERATE problem - (medium, fair,...) - 25-49 %   SEVERE problem - (high, extreme, ?) - 50-95 %   COMPLETE problem - (total,?) -  %    GAIN-SS  IDScr number of 2s & 3s: 5/5  EDScr number of 2s & 3s:  3/5    CAGE-AID:  1. Have you ever felt you should cut down on your drinking/drug use? Yes     2. Have people annoyed you by criticizing your drinking/drug use? No     3. Have you ever felt bad or guilty about your drinking/drug use? Yes     4. Have you ever had a drink/used drugs (eye-opener) first thing in the morning to stead your nerves or get rid of a hangover? No     CAGE-AID Score: 2/4    CLINICAL SUMMARY  Living situation: Patient reports recently moving to the [facility], previously lived with his parents in [place]. Patient reports living alone, renting an apartment.     Marital status: never .     Significant personal relationships: Patient reports his most important relationship is with his parents, reports visiting them each weekend. Patient also reports having a few supportive friends.     Strengths and resources: Patient reports his strengths are kindness, being helpful, empathic, and good runner. Patient reports his parents, sponsor and AA  groups are resources.     Belief system: Patient reports he identifies as agnostic.     Abuse/trauma history: Patient denies any abuse or trauma history.     Education: Patient reports completing bachelor's degree.     Employment and income: Patient reports working full time as an , current income $4300 per month.     Cultural influences and impact: Patient identifies as a cisgender, bisexual,  male. Patient reports having a supportive family, and that being successful was an important value growing up.     Legal issues: Patient reports DUI in the past, no current legal concerns.     Health: Patient no current health concerns, is able to access medical care as needed.     Cause, prognosis, likely consequences of symptoms: Patient identifies that mental health symptoms began to cause significant problems for him after high school, and notes particular difficulty in making decisions. Patient reports family history of depression and alcohol abuse, which suggest a genetic vulnerability to both. Patient identifies substances, SIB and other compulsive behaviors have been the way that he has address mental health symptoms, and have likely perpetuated symptoms.     How diagnostic criteria is met (include symptoms, frequency, duration, functional impairments):   Patient currently meets criteria for Major depressive disorder, recurrent, moderate, with the following symptoms: depressed mood, little appetite, some sleep difficulty (currently prescribed Ambien), significant feelings of worthlessness, difficulty concentrating and making decisions, recurrent thoughts of suicide. At time of this assessment, patient reports some passive SI, reports having a specific plan and means but denies any intent to follow this plan at this time. Patient reported first experiencing depression around age 18.     Patient also meets criteria Generalized anxiety disorder with the following symptoms: excessive anxiety  "daily, reports began around 10 years ago. Patient reports difficulty controlling these worries, reports restlessness, feeling on edge, difficulty concentrating/mind going blank, muscle tension and sleep disturbances. Patient reports his anxiety causes problems in relationships and his work particularly. Patient identifies SIB has been a way that he has managed this anxiety.     Patient was diagnosed with Alcohol use disorder, moderate during intake assessment on 6/12/19 with VALENCIA Kumar. Assessment available in Epic.     Provisional diagnosis of Other specified feeding or eating disorder, atypical anorexia nervosa: Patient reports significant concern about his physical appearance, will restrict eating larger amounts of food in the evening and exercising to compensate for calorie intake. Patient reports a desire to gain some weight, but also report anxiety when he increases his calorie intake. Patient also reports anxiety when he considers decreasing the frequency of exercise. Patient appears to have some insight that his compulsive behaviors related to exercising and eating are problematic, but appears ambivalent to making changes at this time. Patient's BMI at this time appears to be in the normal range. Additional eating disorder assessment would be necessary to make a full diagnosis.     Provisional diagnosis of borderline personality traits, patient reports the following symptoms: patient reports previous borderline personality diagnosis. Patient reports history of SIB behavior for several years, primarily cutting and burning, reports that he had to go the ED for a laceration in 04/2019 (see chart). Patient reports SIB has been a way for him to feel that his emotions are \"real,\" and often feels relieved after engaging in this behavior. Patient reports last SIB the night prior to this assessment, reports SIB has tended to increase when he is not drinking. Patient also reports ongoing SI, with multiple " "attempts in the past. Patient reports passive SI at the time of this assessment. Patient reports extremely low self-esteem, and frequently questions where his life is at this moment. Patient reports some compulsive behavior related to relationships, reports subscribing to multiple dating websites, and also reports compulsive sexual behaviors. Patient reports pervasive feelings of rejection from \"the opposite sex,\" and indicate feeling negatively about himself since most of his peers are currently in significant relationships. Additional personality assessment as well as additional information relating to the pervasiveness of these symptoms would be necessary to make a full diagnosis.     Explanation of any provisional diagnosis, why alternative diagnosis was considered and ruled out: Other eating disorders were also considered, but patient does not meet current weight range for Anorexia nervosa, and does not discuss the out of control feeling associated with bulimia.     Recommendations (treatment, referrals, services needed): Patient should continue to engage in and complete outpatient treatment, continue with current psychiatry services. Patient would also benefit from individual therapy, particularly with a DBT skills focus, as DBT has shown to be effective with SIB and SUDs. Patient may also benefit from referral to a DBT program or potential referral to ED programming.     Prioritization of needed mental health, ancillary, or other services: Patient should continue with current mental health services, and will be referred to individual therapy. Patient should continue to engage in and complete outpatient treatment and follow all discharge recommendations.    DIAGNOSIS  Provisional diagnostic hypothesis: both provisional diagnoses are to patient meeting most of the criteria for these diagnoses but further assessment is necessary to diagnoses.     Diagnosis:   Major depressive disorder, recurrent, " moderate  Generalized anxiety disorder  Alcohol use disorder, moderate    Therapist's signature/Supervisor/Co-signature statement:    Performed and documented by: EVAN Blunt, [facility]  Supervisor: Dr. Estella Coker PsyD, ABPP, LP  Date:  6/20/2019  Time:  5:00 PM

## 2021-10-16 ENCOUNTER — HEALTH MAINTENANCE LETTER (OUTPATIENT)
Age: 31
End: 2021-10-16

## 2022-04-09 ENCOUNTER — HOSPITAL ENCOUNTER (INPATIENT)
Facility: CLINIC | Age: 32
LOS: 3 days | Discharge: PSYCHIATRIC OR SUBSTANCE USE FACILITY | End: 2022-04-13
Attending: EMERGENCY MEDICINE | Admitting: PSYCHIATRY & NEUROLOGY
Payer: COMMERCIAL

## 2022-04-09 DIAGNOSIS — F60.3 BORDERLINE PERSONALITY DISORDER (H): Primary | ICD-10-CM

## 2022-04-09 DIAGNOSIS — F10.20 UNCOMPLICATED ALCOHOL DEPENDENCE (H): ICD-10-CM

## 2022-04-09 DIAGNOSIS — Z20.822 CONTACT WITH AND (SUSPECTED) EXPOSURE TO COVID-19: ICD-10-CM

## 2022-04-09 DIAGNOSIS — F10.10 ALCOHOL ABUSE: ICD-10-CM

## 2022-04-09 LAB — ALCOHOL BREATH TEST: 0.21 (ref 0–0.01)

## 2022-04-09 PROCEDURE — C9803 HOPD COVID-19 SPEC COLLECT: HCPCS | Performed by: EMERGENCY MEDICINE

## 2022-04-09 PROCEDURE — 82075 ASSAY OF BREATH ETHANOL: CPT | Performed by: EMERGENCY MEDICINE

## 2022-04-09 PROCEDURE — 99285 EMERGENCY DEPT VISIT HI MDM: CPT | Mod: 25 | Performed by: EMERGENCY MEDICINE

## 2022-04-09 PROCEDURE — 99284 EMERGENCY DEPT VISIT MOD MDM: CPT | Performed by: EMERGENCY MEDICINE

## 2022-04-10 ENCOUNTER — TELEPHONE (OUTPATIENT)
Dept: BEHAVIORAL HEALTH | Facility: CLINIC | Age: 32
End: 2022-04-10

## 2022-04-10 PROBLEM — F10.10 ALCOHOL ABUSE: Status: ACTIVE | Noted: 2022-04-10

## 2022-04-10 LAB
ALBUMIN SERPL-MCNC: 3.5 G/DL (ref 3.4–5)
ALP SERPL-CCNC: 78 U/L (ref 40–150)
ALT SERPL W P-5'-P-CCNC: 41 U/L (ref 0–70)
AMPHETAMINES UR QL SCN: NORMAL
ANION GAP SERPL CALCULATED.3IONS-SCNC: 9 MMOL/L (ref 3–14)
AST SERPL W P-5'-P-CCNC: 33 U/L (ref 0–45)
BARBITURATES UR QL: NORMAL
BASOPHILS # BLD AUTO: 0.1 10E3/UL (ref 0–0.2)
BASOPHILS NFR BLD AUTO: 1 %
BENZODIAZ UR QL: NORMAL
BILIRUB SERPL-MCNC: 0.6 MG/DL (ref 0.2–1.3)
BUN SERPL-MCNC: 27 MG/DL (ref 7–30)
CALCIUM SERPL-MCNC: 8.4 MG/DL (ref 8.5–10.1)
CANNABINOIDS UR QL SCN: NORMAL
CHLORIDE BLD-SCNC: 107 MMOL/L (ref 94–109)
CO2 SERPL-SCNC: 24 MMOL/L (ref 20–32)
COCAINE UR QL: NORMAL
CREAT SERPL-MCNC: 0.7 MG/DL (ref 0.66–1.25)
EOSINOPHIL # BLD AUTO: 0.2 10E3/UL (ref 0–0.7)
EOSINOPHIL NFR BLD AUTO: 4 %
ERYTHROCYTE [DISTWIDTH] IN BLOOD BY AUTOMATED COUNT: 12.4 % (ref 10–15)
GFR SERPL CREATININE-BSD FRML MDRD: >90 ML/MIN/1.73M2
GLUCOSE BLD-MCNC: 105 MG/DL (ref 70–99)
HCT VFR BLD AUTO: 41.9 % (ref 40–53)
HGB BLD-MCNC: 14.6 G/DL (ref 13.3–17.7)
IMM GRANULOCYTES # BLD: 0 10E3/UL
IMM GRANULOCYTES NFR BLD: 0 %
LYMPHOCYTES # BLD AUTO: 2.7 10E3/UL (ref 0.8–5.3)
LYMPHOCYTES NFR BLD AUTO: 41 %
MCH RBC QN AUTO: 33.3 PG (ref 26.5–33)
MCHC RBC AUTO-ENTMCNC: 34.8 G/DL (ref 31.5–36.5)
MCV RBC AUTO: 96 FL (ref 78–100)
MONOCYTES # BLD AUTO: 0.6 10E3/UL (ref 0–1.3)
MONOCYTES NFR BLD AUTO: 9 %
NEUTROPHILS # BLD AUTO: 2.9 10E3/UL (ref 1.6–8.3)
NEUTROPHILS NFR BLD AUTO: 45 %
NRBC # BLD AUTO: 0 10E3/UL
NRBC BLD AUTO-RTO: 0 /100
OPIATES UR QL SCN: NORMAL
PLATELET # BLD AUTO: 307 10E3/UL (ref 150–450)
POTASSIUM BLD-SCNC: 4.2 MMOL/L (ref 3.4–5.3)
PROT SERPL-MCNC: 7.5 G/DL (ref 6.8–8.8)
RBC # BLD AUTO: 4.38 10E6/UL (ref 4.4–5.9)
SARS-COV-2 RNA RESP QL NAA+PROBE: NEGATIVE
SODIUM SERPL-SCNC: 140 MMOL/L (ref 133–144)
WBC # BLD AUTO: 6.5 10E3/UL (ref 4–11)

## 2022-04-10 PROCEDURE — 250N000013 HC RX MED GY IP 250 OP 250 PS 637: Performed by: EMERGENCY MEDICINE

## 2022-04-10 PROCEDURE — 87635 SARS-COV-2 COVID-19 AMP PRB: CPT | Performed by: EMERGENCY MEDICINE

## 2022-04-10 PROCEDURE — 250N000013 HC RX MED GY IP 250 OP 250 PS 637: Performed by: NURSE PRACTITIONER

## 2022-04-10 PROCEDURE — 36415 COLL VENOUS BLD VENIPUNCTURE: CPT | Performed by: EMERGENCY MEDICINE

## 2022-04-10 PROCEDURE — 80307 DRUG TEST PRSMV CHEM ANLYZR: CPT | Performed by: EMERGENCY MEDICINE

## 2022-04-10 PROCEDURE — 90791 PSYCH DIAGNOSTIC EVALUATION: CPT

## 2022-04-10 PROCEDURE — H2032 ACTIVITY THERAPY, PER 15 MIN: HCPCS

## 2022-04-10 PROCEDURE — 99221 1ST HOSP IP/OBS SF/LOW 40: CPT | Performed by: PHYSICIAN ASSISTANT

## 2022-04-10 PROCEDURE — 80053 COMPREHEN METABOLIC PANEL: CPT | Performed by: EMERGENCY MEDICINE

## 2022-04-10 PROCEDURE — 250N000013 HC RX MED GY IP 250 OP 250 PS 637: Performed by: PSYCHIATRY & NEUROLOGY

## 2022-04-10 PROCEDURE — 85004 AUTOMATED DIFF WBC COUNT: CPT | Performed by: EMERGENCY MEDICINE

## 2022-04-10 PROCEDURE — 128N000001 HC R&B CD/MH ADULT

## 2022-04-10 PROCEDURE — HZ2ZZZZ DETOXIFICATION SERVICES FOR SUBSTANCE ABUSE TREATMENT: ICD-10-PCS | Performed by: EMERGENCY MEDICINE

## 2022-04-10 PROCEDURE — 99223 1ST HOSP IP/OBS HIGH 75: CPT | Mod: AI | Performed by: PSYCHIATRY & NEUROLOGY

## 2022-04-10 RX ORDER — ESCITALOPRAM OXALATE 10 MG/1
10 TABLET ORAL DAILY
Status: DISCONTINUED | OUTPATIENT
Start: 2022-04-10 | End: 2022-04-11

## 2022-04-10 RX ORDER — IBUPROFEN 600 MG/1
600 TABLET, FILM COATED ORAL EVERY 6 HOURS PRN
Status: DISCONTINUED | OUTPATIENT
Start: 2022-04-10 | End: 2022-04-13 | Stop reason: HOSPADM

## 2022-04-10 RX ORDER — TRAZODONE HYDROCHLORIDE 50 MG/1
50 TABLET, FILM COATED ORAL
Status: DISCONTINUED | OUTPATIENT
Start: 2022-04-10 | End: 2022-04-13 | Stop reason: HOSPADM

## 2022-04-10 RX ORDER — PRAZOSIN HYDROCHLORIDE 1 MG/1
1 CAPSULE ORAL AT BEDTIME
Status: DISCONTINUED | OUTPATIENT
Start: 2022-04-10 | End: 2022-04-13 | Stop reason: HOSPADM

## 2022-04-10 RX ORDER — LOPERAMIDE HCL 2 MG
2 CAPSULE ORAL 4 TIMES DAILY PRN
Status: DISCONTINUED | OUTPATIENT
Start: 2022-04-10 | End: 2022-04-13 | Stop reason: HOSPADM

## 2022-04-10 RX ORDER — FOLIC ACID 1 MG/1
1 TABLET ORAL DAILY
Status: DISCONTINUED | OUTPATIENT
Start: 2022-04-10 | End: 2022-04-13 | Stop reason: HOSPADM

## 2022-04-10 RX ORDER — NICOTINE 21 MG/24HR
1 PATCH, TRANSDERMAL 24 HOURS TRANSDERMAL DAILY
Status: DISCONTINUED | OUTPATIENT
Start: 2022-04-10 | End: 2022-04-10

## 2022-04-10 RX ORDER — HYDROXYZINE HYDROCHLORIDE 25 MG/1
25-50 TABLET, FILM COATED ORAL EVERY 4 HOURS PRN
Status: DISCONTINUED | OUTPATIENT
Start: 2022-04-10 | End: 2022-04-13 | Stop reason: HOSPADM

## 2022-04-10 RX ORDER — ONDANSETRON 4 MG/1
4 TABLET, ORALLY DISINTEGRATING ORAL EVERY 6 HOURS PRN
Status: DISCONTINUED | OUTPATIENT
Start: 2022-04-10 | End: 2022-04-13 | Stop reason: HOSPADM

## 2022-04-10 RX ORDER — ACETAMINOPHEN 325 MG/1
650 TABLET ORAL EVERY 4 HOURS PRN
Status: DISCONTINUED | OUTPATIENT
Start: 2022-04-10 | End: 2022-04-13 | Stop reason: HOSPADM

## 2022-04-10 RX ORDER — MAGNESIUM HYDROXIDE/ALUMINUM HYDROXICE/SIMETHICONE 120; 1200; 1200 MG/30ML; MG/30ML; MG/30ML
30 SUSPENSION ORAL EVERY 4 HOURS PRN
Status: DISCONTINUED | OUTPATIENT
Start: 2022-04-10 | End: 2022-04-13 | Stop reason: HOSPADM

## 2022-04-10 RX ORDER — LANOLIN ALCOHOL/MO/W.PET/CERES
3 CREAM (GRAM) TOPICAL EVERY EVENING
Status: DISCONTINUED | OUTPATIENT
Start: 2022-04-10 | End: 2022-04-13 | Stop reason: HOSPADM

## 2022-04-10 RX ORDER — MULTIPLE VITAMINS W/ MINERALS TAB 9MG-400MCG
1 TAB ORAL DAILY
Status: DISCONTINUED | OUTPATIENT
Start: 2022-04-10 | End: 2022-04-13 | Stop reason: HOSPADM

## 2022-04-10 RX ORDER — HYDROXYZINE HYDROCHLORIDE 25 MG/1
25 TABLET, FILM COATED ORAL EVERY 4 HOURS PRN
Status: DISCONTINUED | OUTPATIENT
Start: 2022-04-10 | End: 2022-04-10

## 2022-04-10 RX ORDER — DIAZEPAM 5 MG
5-20 TABLET ORAL EVERY 30 MIN PRN
Status: DISCONTINUED | OUTPATIENT
Start: 2022-04-10 | End: 2022-04-13 | Stop reason: HOSPADM

## 2022-04-10 RX ORDER — DIPHENHYDRAMINE HCL 50 MG
50 CAPSULE ORAL ONCE
Status: COMPLETED | OUTPATIENT
Start: 2022-04-10 | End: 2022-04-10

## 2022-04-10 RX ORDER — AMOXICILLIN 250 MG
1 CAPSULE ORAL 2 TIMES DAILY PRN
Status: DISCONTINUED | OUTPATIENT
Start: 2022-04-10 | End: 2022-04-13 | Stop reason: HOSPADM

## 2022-04-10 RX ORDER — ESCITALOPRAM OXALATE 10 MG/1
10 TABLET ORAL DAILY
Status: ON HOLD | COMMUNITY
End: 2022-04-12

## 2022-04-10 RX ORDER — DIPHENHYDRAMINE HCL 50 MG
CAPSULE ORAL
Status: DISCONTINUED
Start: 2022-04-10 | End: 2022-04-10 | Stop reason: HOSPADM

## 2022-04-10 RX ORDER — TRAZODONE HYDROCHLORIDE 50 MG/1
50-100 TABLET, FILM COATED ORAL
Status: ON HOLD | COMMUNITY
End: 2022-04-12

## 2022-04-10 RX ORDER — HYDROXYZINE PAMOATE 50 MG/1
50 CAPSULE ORAL EVERY 6 HOURS PRN
Status: ON HOLD | COMMUNITY
End: 2022-04-12

## 2022-04-10 RX ADMIN — FOLIC ACID 1 MG: 1 TABLET ORAL at 12:10

## 2022-04-10 RX ADMIN — MULTIPLE VITAMINS W/ MINERALS TAB 1 TABLET: TAB at 08:20

## 2022-04-10 RX ADMIN — ESCITALOPRAM OXALATE 10 MG: 10 TABLET ORAL at 12:10

## 2022-04-10 RX ADMIN — TRAZODONE HYDROCHLORIDE 50 MG: 50 TABLET ORAL at 22:36

## 2022-04-10 RX ADMIN — HYDROXYZINE HYDROCHLORIDE 50 MG: 25 TABLET, FILM COATED ORAL at 20:33

## 2022-04-10 RX ADMIN — DIAZEPAM 10 MG: 5 TABLET ORAL at 12:11

## 2022-04-10 RX ADMIN — MELATONIN TAB 3 MG 3 MG: 3 TAB at 20:33

## 2022-04-10 RX ADMIN — PRAZOSIN HYDROCHLORIDE 1 MG: 1 CAPSULE ORAL at 22:36

## 2022-04-10 RX ADMIN — DIAZEPAM 5 MG: 5 TABLET ORAL at 02:30

## 2022-04-10 RX ADMIN — DIAZEPAM 10 MG: 5 TABLET ORAL at 08:21

## 2022-04-10 RX ADMIN — THIAMINE HCL TAB 100 MG 100 MG: 100 TAB at 08:20

## 2022-04-10 RX ADMIN — HYDROXYZINE HYDROCHLORIDE 50 MG: 25 TABLET, FILM COATED ORAL at 16:15

## 2022-04-10 RX ADMIN — DIPHENHYDRAMINE HYDROCHLORIDE 50 MG: 50 CAPSULE ORAL at 03:48

## 2022-04-10 ASSESSMENT — ENCOUNTER SYMPTOMS
BACK PAIN: 0
SHORTNESS OF BREATH: 0
DIFFICULTY URINATING: 0
NECK PAIN: 0
CHEST TIGHTNESS: 0
FATIGUE: 0
CONSTIPATION: 0
DYSURIA: 0
VOMITING: 0
HEADACHES: 0
DIZZINESS: 0
DIARRHEA: 0
FEVER: 0
EYE PAIN: 0
DYSPHORIC MOOD: 1
COLOR CHANGE: 0
PALPITATIONS: 0
MYALGIAS: 0
COUGH: 0
ABDOMINAL DISTENTION: 0
CONFUSION: 0
SORE THROAT: 0
CHILLS: 0
ARTHRALGIAS: 0
NAUSEA: 0
FREQUENCY: 0
ABDOMINAL PAIN: 0
WEAKNESS: 0

## 2022-04-10 ASSESSMENT — ACTIVITIES OF DAILY LIVING (ADL)
LAUNDRY: WITH SUPERVISION
LAUNDRY: WITH SUPERVISION
DRESS: SCRUBS (BEHAVIORAL HEALTH)
ORAL_HYGIENE: INDEPENDENT
ORAL_HYGIENE: INDEPENDENT
HYGIENE/GROOMING: INDEPENDENT
HYGIENE/GROOMING: INDEPENDENT;SHOWER
DRESS: INDEPENDENT

## 2022-04-10 ASSESSMENT — PATIENT HEALTH QUESTIONNAIRE - PHQ9: SUM OF ALL RESPONSES TO PHQ9 QUESTIONS 1 & 2: 6

## 2022-04-10 NOTE — PROGRESS NOTES
4/9/2022  Cong Tuttle 1990     [facility] Crisis Assessment    Patient was assessed: remote  Patient location: [facility]    Referral Data and Chief Complaint  Cong is a 31 year old who uses  pronouns. Patient presented to the ED alone and was referred to the ED by self. Patient is presenting to the ED for the following concerns: ETOH intoxication and suicidality.      Informed Consent and Assessment Methods    Patient is his own guardian. Writer met with patient and explained the crisis assessment process, including applicable information disclosures and limits to confidentiality, assessed understanding of the process, and obtained consent to proceed with the assessment. Patient was observed to be able to participate in the assessment as evidenced by verbal assent. Assessment methods included conducting a formal interview with patient, review of medical records, collaboration with medical staff, and obtaining relevant collateral information from family and community providers when available.    Narrative Summary of Presenting Problem and Current Functioning  What led to the patient presenting for crisis services, factors that make the crisis life threatening or complex, stressors, how is this disrupting the patient's life, and how current functioning is in comparison to baseline. How is patient presenting during the assessment.     The patient presented to the emergency department this evening during an episode of drinking/intoxication.  He began feeling suicidal.  On interview with this writer, he verbalizes suicidal thoughts, but no plan or intent.  His CHRISTIN at admit was .211.  He has been attempting recovery and recently relapsed after a period of 7 months of abstinence in 2021.  He has been drinking daily since the relapse intermittent with brief hospital stays to address intoxication/withdrawal/shelter.        History of the Crisis  Duration of the current crisis, coping skills attempted to reduce the  crisis, community resources used, and past presentations.    Patient has had three psychiatric admissions since January for similar presentation.  He was discharged on Thursday from [facility].  Dx hx includes Primary Psychiatric Diagnosis: Unspecified depressive disorder, Unspecified anxiety, Alcohol use disorder, severe, dependence.  He has a hx of CHRIS treatment services with most recent being at [facility] last year.  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          Collateral Information  None available    Risk Assessment    Risk of Harm to Self     ESS-6  1.a. Over the past 2 weeks, have you had thoughts of killing yourself? Yes  1.b. Have you ever attempted to kill yourself and, if yes, when did this last happen? Yes Three years ago by cutting wrists   2. Recent or current suicide plan? No   3. Recent or current intent to act on ideation? No  4. Lifetime psychiatric hospitalization? Yes  5. Pattern of excessive substance use? Yes  6. Current irritability, agitation, or aggression? No  Scoring note: BOTH 1a and 1b must be yes for it to score 1 point, if both are not yes it is zero. All others are 1 point per number. If all questions 1a/1b - 6 are no, risk is negligible. If one of 1a/1b is yes, then risk is mild. If either question 2 or 3, but not both, is yes, then risk is automatically moderate regardless of total score. If both 2 and 3 are yes, risk is automatically high regardless of total score.     Score: 4, moderate risk    The patient has the following risk factors for suicide: substance abuse, depressive symptoms, isolation, poor decision making, poor impulse control and prior suicide attempt    Is the patient experiencing current suicidal ideation: Yes. Thoughts to kill self with no plan or intent    Is the patient engaging in preparatory suicide behaviors (formulating how to act on plan, giving  away possessions, saying goodbye, displaying dramatic behavior changes, etc)? No    Does the patient have access to firearms or other lethal means? no    The patient has the following protective factors: voluntarily seeking mental health support, displays resiliency  and expresses desire to engage in treatment    Support system information: Friends    Patient strengths: Positive education hx    Does the patient engage in non-suicidal self-injurious behavior (NSSI/SIB)? no. However, patient has a history of SIB via cutting and burning. Pt has not engaged in SIB since many years    Is the patient vulnerable to sexual exploitation?  No    Is the patient experiencing abuse or neglect? no    Is the patient a vulnerable adult? No      Risk of Harm to Others  The patient has the following risk factors of harm to others: no risk factors identified    Does the patient have thoughts of harming others? No    Is the patient engaging in sexually inappropriate behavior?  no       Current Substance Abuse    Is there recent substance abuse? Substance type(s): alcohol Frequency: daily Quantity: 1.75  Method: oral Duration: for several days intermittent for brief periods of abstinence followed by relapse Last use: today    Was a urine drug screen or blood alcohol level obtained: Yes CHRISTIN .211    CAGE AID  Have you felt you ought to cut down on your drinking or drug use?  Yes  Have people annoyed you by criticizing your drinking or drug use? Yes  Have you felt bad or guilty about your drinking or drug use? Yes  Have you ever had a drink or used drugs first thing in the morning to steady your nerves or to get rid of a hangover? Yes  Score: 4/4       Current Symptoms/Concerns    Symptoms  Attention, hyperactivity, and impulsivity symptoms present: No    Anxiety symptoms present: Yes: Generalized Symptoms: Avoidance, Cognitive anxiety - feelings of doom, racing thoughts, difficulty concentrating  and Excessive worry      Appetite symptoms  present: No     Behavioral difficulties present: No     Cognitive impairment symptoms present: No    Depressive symptoms present: Yes Impaired concentration, Impaired decision making , Isolative  and Thoughts of suicide/death      Eating disorder symptoms present: No    Learning disabilities, cognitive challenges, and/or developmental disorder symptoms present: No     Manic/hypomanic symptoms present: No    Personality and interpersonal functioning difficulties present : No    Psychosis symptoms present: No      Sleep difficulties present: No    Substance abuse disorder symptoms present: Yes Persistent desire or unsuccessful efforts to cut down or control use, A great deal of time is spent in activities necessary to obtain substance(s), use substance(s), or recover from their effects, Cravings or strong desire to use, Recurrent substance use resulting in failure to fulfill major role obligations at school, work, or home, Continued substance use despite having persistent or recurrent social or interpersonal problems caused by or exacerbated by the use of substance(s), Important social, occupational, or recreational activities are given up or reduced because of substance use, Substance abuse is continued despite knowledge of having a persistent or recurrent physical or psychological problem that has been caused of exacerbated by substance use and Withdrawal     Trauma and stressor related symptoms present: No           Mental Status Exam   Affect: Blunted and Flat   Appearance: Appropriate    Attention Span/Concentration: Attentive?    Eye Contact: Avoidant and Variable   Fund of Knowledge: Appropriate    Language /Speech Content: Fluent   Language /Speech Volume: Normal    Language /Speech Rate/Productions: Normal    Recent Memory: Intact   Remote Memory: Intact   Mood: Apathetic    Orientation to Person: Yes    Orientation to Place: Yes   Orientation to Time of Day: Yes    Orientation to Date: Yes    Situation (Do  they understand why they are here?): Yes    Psychomotor Behavior: Normal    Thought Content: Clear   Thought Form: Intact       Mental Health and Substance Abuse History    History  Current and historical diagnoses or mental health concerns: Depression, Anxiety, ETOH use disorder    Prior MH services (inpatient, programmatic care, outpatient, etc) : Yes Hospitalized three times since January for similar presentation, with limited follow up    Has the patient used [facility] crisis team services before?: No    History of substance abuse: Yes ETOH use disorder -= some remote drug use hx    Prior CHRIS services (inpatient, programmatic care, detox, outpatient, etc) : Yes Hx of OP tx and was in [facility] from Jan to Aug last year    History of commitment: No    Family history of MH/CHRIS: No    Trauma history: No    Medication  Psychotropic medications: No    Current Care Team  Primary Care Provider: 12 April 2022 Time: 3:20PM Provider: Dr Tesha Parrish MD  [facility]  [address]  [place] [address]  Phone: [phone]        Psychiatrist: No    Therapist: No    : No    CTSS or ARMHS: No    ACT Team: No    Other: No    Release of Information  Was a release of information signed: No. Reason: IP admission      Biopsychosocial Information    Socioeconomic Information  Current living situation: Homeless    Employment/income source: Not employed at this time    Relevant legal issues: N/A    Cultural, Catholic, or spiritual influences on mental health care: N/A    Is the patient active in the  or a : No      Relevant Medical Concerns   Patient identifies concerns with completing ADLs? No     Patient can ambulate independently? Yes     Other medical concerns? No     History of concussion or TBI? 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    Substance-Related & Addictive Disorders Alcohol Use Disorder   303.90 (F10.20) Severe The ICD-10-CM code  indicates the comorbid presence of a moderate or severe substance use disorder, which must be present in ordre to apply the code for substance wtihdrawal dependence noted - primary     311 (F32.9) Unspecified Depressive Disorder  - by history         Therapeutic Intervention  The following therapeutic methodologies were employed when working with the patient: establishing rapport, active listening, assessing dimensions of crisis, solution focused brief therapy, identifying additional supports and alternative coping skills, psychoeducation, motivational interviewing, brief supportive therapy and harm reduction. Patient response to intervention: amenable.      Disposition  Recommended disposition: Detox      Reviewed case and recommendations with attending provider. Attending Name: Dr Plata      Attending concurs with disposition: Yes      Patient concurs with disposition: Yes      Guardian concurs with disposition: NA     Final disposition: Detox.     Inpatient Details (if applicable):  Is patient admitted voluntarily:Yes    Patient aware of potential for transfer if there is not appropriate placement? NA     Patient is willing to travel outside of the [facility] for placement? NA      Behavioral Intake Notified? Yes: Date: 4/10/22 Time: 4:30am      Clinical Substantiation of Recommendations   Rationale with supporting factors for disposition and diagnosis.       The patient presented to the emergency department this evening during an episode of drinking/intoxication.  He began feeling suicidal.  On interview with this writer, he verbalizes suicidal thoughts, but no plan or intent.  His CHRISTIN at admit was .211.  He has been attempting recovery and recently relapsed after a period of 7 months of abstinence in 2021.  He has been drinking daily since the relapse intermittent with brief hospital stays to address intoxication/withdrawal/shelter.  Patient has had three psychiatric admissions since January for similar presentation.   He was discharged on Thursday from [facility].  Dx hx includes Primary Psychiatric Diagnosis: Unspecified depressive disorder, Unspecified anxiety, Alcohol use disorder, severe, dependence.  He has a hx of CHRIS treatment services with most recent being at [facility] last year.  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  He is willing to contract for safety.  Detox recommended.         Assessment Details  Patient interview started at: 4:00am and completed at: 4:30am    Total duration spent on the patient case in minutes: 1.0 hrs     CPT code(s) utilized: 67102 - Psychotherapy for Crisis - 60 (30-74*) min       Nandini Apodaca [facility]

## 2022-04-10 NOTE — SAFE
Cong Tuttle  April 10, 2022    SAFE Note    Critical Safety Issues: Suicidal ideations, but contracts for safety, no plan or intent; ETOH intoxication  The patient presented to the emergency department this evening during an episode of drinking/intoxication.  He began feeling suicidal.  On interview with this writer, he verbalizes suicidal thoughts, but no plan or intent.  His CHRISTIN at admit was .211.  He has been attempting recovery and recently relapsed after a period of 7 months of abstinence in 2021.  He has been drinking daily since the relapse intermittent with brief hospital stays to address intoxication/withdrawal/shelter.  Patient has had three psychiatric admissions since January for similar presentation.  He was discharged on Thursday from [facility].  Dx hx includes Primary Psychiatric Diagnosis: Unspecified depressive disorder, Unspecified anxiety, Alcohol use disorder, severe, dependence.  He has a hx of CHRIS treatment services with most recent being at [facility] last year.  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  He is willing to contract for safety.  Detox recommended.        Current Suicidal Ideation/Self-Injurious Concerns/Methods: None - N/A      Current or Historical Inappropriate Sexual Behavior: No      Current or Historical Aggression/Homicidal Ideation: None - N/A      Triggers: None identified    Updated care team: Yes: Dr Plata    For additional details see full [facility] assessment.       SYLVIA Fenton

## 2022-04-10 NOTE — DOWNTIME EVENT NOTE
The EMR was down for 1.5 hours on 4/10/2022.    Lucina monahan was responsible for completing the paper charting during this time period.     The following information was re-entered into the system by Lucina Monahan RN: Home meds, Flowsheet data and MAR    The following information will remain in the paper chart: n/a    Lucina Monahan RN  4/10/2022

## 2022-04-10 NOTE — PROGRESS NOTES
04/10/22 1800   Group Therapy Session   Group Attendance attended group session   Time Session Began 1645   Time Session Ended 1730   Total Time patient participated (minutes) 45   Total # Attendees 8   Group Type recreation   Group Topic Covered leisure exploration/use of leisure time   Group Session Detail TR leisure group   Patient Response/Contribution cooperative with task   Patient Response Detail Pt attended the structured Therapeutic Recreation group, participating in a group activity. Pt participated in group discussion and leisure participation as a healthy outlet to gain self-esteem, manage behaviors, improve social skills, and decrease isolation.  Pt remained focused and engaged throughout group activity.  Pt mood was sociable and was appropriate with interactions, contributing to the clues and descriptions throughout the activity. Pt was a full participant for the duration of the group.

## 2022-04-10 NOTE — CONSULTS
[facility]  Internal Medicine Consult     Cong Tuttle MRN# [id number]   Age: 31 year old YOB: 1990     Date of Admission: 4/9/2022  Date of Consult: 4/10/2022    Primary Care Provider: Tesha Rodriguez    Requesting Service: Behavioral Health - Herlinda Teague MD  Reason for Consult: General Medical Evaluation      SUBJECTIVE   CC:   Elevated BP   Assessment and Plan/Recommendations:   Cong Tuttle is a 31 year old male with history of etoh abuse, depression, insomnia, and anxiety who was admitted to station 3A with etoh withdrawal and passive SI. Of note, recently discharged from [facility] 4/7/2022 with similar presentation    Etoh dependency with acute withdrawal, depression and anxiety with passive SI, insomnia: Discharged from [facility] 4/7 and started drinking vodka. Passive SI  - Management per psych     Elevated BP: BP 130s-40s/80s-90s in the setting of etoh withdrawal. Contacted medicine if BP spikes >170/>110 or if consistently >140s/>90s once detox complete    Hypocalcemia: Mild. Ca 8.4. Albumin normal. Trend with PCP on discharge       Currently, medically stable and internal medicine will sign off. Please contact if future questions or concerns arise. Thank you for the opportunity to be a part of this patient's care.      Shanon Piña PA-C  Internal Medicine JANICE Hospitalist  Page job code [phone] (3B), [phone] (3A), or [phone] ([facility] and )  Text paging via m[employer] is appreciated  April 10, 2022         HPI:   Cong Tuttle is a 31 year old male with history of etoh abuse, depression, insomnia, and anxiety who was admitted to station 3A with etoh withdrawal and passive SI. Of note, recently discharged from [facility] 4/7/2022 with similar presentation    Patient reports leaving [facility] on Wednesday and left and started drinking etoh. He endorsed passive SI in the ED prompting admission for mental health management. Currently he  "reports feeling slightly nauseated. No emesis. Was able to tolerate a small breakfast. Minor shakes, reports they are better than prior withdrawal episodes. No AMS. Sleep is poor. Feel anxious. No fever or chlls       Past Medical History:     Past Medical History:   Diagnosis Date     Anxiety      Borderline personality disorder (H)      Depression         Reviewed and updated in [facility].     Past Surgical History:    History reviewed. No pertinent surgical history.      Social History:     Social History     Tobacco Use     Smoking status: Former Smoker     Packs/day: 0.00     Smokeless tobacco: Current User     Types: Chew     Tobacco comment: e cig   Substance Use Topics     Alcohol use: Yes     Comment: daily- last drink was today     Drug use: Not Currently        Family History:     Family History   Problem Relation Age of Onset     Depression Mother      Anxiety Disorder Mother      Alcoholism Mother      Hypertension Father      Alcoholism Father      Bipolar Disorder Brother          Allergies:   No Known Allergies      Medications:   Reviewed. Please see MAR     Review of Systems:   10 point ROS of systems including Constitutional, Eyes, Respiratory, Cardiovascular, Gastroenterology, Genitourinary, Integumentary, Muscularskeletal, Psychiatric were all negative except for pertinent positives noted in my HPI.    OBJECTIVE   Physical Exam:   Vitals were reviewed  Blood pressure (!) 148/92, pulse 89, temperature 98  F (36.7  C), temperature source Temporal, resp. rate 16, height 1.753 m (5' 9\"), weight 79.5 kg (175 lb 4.8 oz), SpO2 97 %.  General: Alert and oriented x3, appears somewhat anxious   HEENT: Anicteric sclera, MMM  Cardiovascular: RRR, S1S2. No murmur noted  Lungs: CTAB without wheezing or crackles   GI: Abdomen soft, non-tender with bowel sounds present. No guarding or rebound   Vascular: No peripheral edema, distal pulses palpable  Neurologic: No focal deficits, CN II-XII grossly intact  Skin: No " jaundice, rashes, or lesions. Uncountable number of old, healed lateral self-inflicted lacerations on the b/l forearms with healed burn marks as well. No e/o infection         Data:        Lab Results   Component Value Date     04/10/2022    Lab Results   Component Value Date    CHLORIDE 107 04/10/2022    Lab Results   Component Value Date    BUN 27 04/10/2022      Lab Results   Component Value Date    POTASSIUM 4.2 04/10/2022    Lab Results   Component Value Date    CO2 24 04/10/2022    Lab Results   Component Value Date    CR 0.70 04/10/2022        Lab Results   Component Value Date    WBC 6.5 04/10/2022    HGB 14.6 04/10/2022    HCT 41.9 04/10/2022    MCV 96 04/10/2022     04/10/2022     Lab Results   Component Value Date    WBC 6.5 04/10/2022

## 2022-04-10 NOTE — ED PROVIDER NOTES
"     [facility] EMERGENCY DEPARTMENT ([facility])  4/09/22    History     Chief Complaint   Patient presents with     Alcohol Problem     per patient he was sober for 2 months. relapsed on March 14 of this year. He has been daily since. Drinks about 1.75L of vodka. Last drink was an hour ago. Seeking detox     Depression     \"I don't wanna be alive. I do't think I can actually kill myself but at the same time I dont wanna be alive.\" patient said \"I don't know \" when asked if he is suicidal     HPI  Cong Tuttle is a 31 year old male with PMH significant for polysubstance abuse, alcohol use disorder, JOSSIE, and depression who presents to the ED seeking detox from alcohol and for evaluation of depression.  Patient states he has been drinking 1/2 to 1 L of alcohol per day since the middle of March.  Last drink was proximately 1 hour prior to arrival.  Denies history of withdrawal seizures.  Has been to detox before would like to be readmitted.  Also has severe depression related to his continued alcohol use.  Does not have suicidal plan but sometimes feels that he does not want to be alive.  He has had a hard time holding down a job.  He does not have a specific plan.  Does have history of 1 prior suicide attempt.    Per review of the chart, patient was admitted to [facility] from 4/5-4/7 for alcohol use and suicidal ideation.  At this time, patient reported heavy alcohol use after recently losing his job and housing.  During hospitalization, patient agreed to restart Lexapro.  His alcohol detox course was uncomplicated with minimal signs of withdrawal.  The patient was discharged home.    Past Medical History  Past Medical History:   Diagnosis Date     Anxiety      Borderline personality disorder (H)      Depression      History reviewed. No pertinent surgical history.  b complex vitamins tablet  busPIRone (BUSPAR) 10 MG tablet  cholecalciferol, vitamin D3, (VITAMIN D3 ORAL)  clonazePAM (KLONOPIN) 1 MG " tablet  diphenhydrAMINE (BENADRYL) 25 mg tablet  disulfiram (ANTABUSE) 250 mg tablet  fish oil-omega-3 fatty acids 300-1,000 mg capsule  Lactobacillus rhamnosus GG (CULTURELLE) 15 billion cell CpSP  LORazepam (ATIVAN) 0.5 MG tablet  multivitamin therapeutic tablet  naltrexone (DEPADE) 50 mg tablet  nicotine (NICODERM CQ) 21 mg/24 hr  nicotine polacrilex (NICORETTE) 4 MG gum  venlafaxine (EFFEXOR-XR) 150 MG 24 hr capsule  zolpidem (AMBIEN CR) 12.5 MG CR tablet      No Known Allergies  Past medical history, past surgical history, medications, and allergies were reviewed with the patient. Additional pertinent items: None    Family History  Family History   Problem Relation Age of Onset     Depression Mother      Anxiety Disorder Mother      Alcoholism Mother      Hypertension Father      Alcoholism Father      Bipolar Disorder Brother      Family history was reviewed with the patient. Additional pertinent items: None    Social History  Social History     Tobacco Use     Smoking status: Former Smoker     Packs/day: 0.00     Smokeless tobacco: Current User     Types: Chew     Tobacco comment: e cig   Substance Use Topics     Alcohol use: Yes     Comment: daily- last drink was today     Drug use: Not Currently      Social history was reviewed with the patient. Additional pertinent items: None      Review of Systems   Constitutional: Negative for chills, fatigue and fever.   HENT: Negative for congestion and sore throat.    Eyes: Negative for pain and visual disturbance.   Respiratory: Negative for cough, chest tightness and shortness of breath.    Cardiovascular: Negative for chest pain and palpitations.   Gastrointestinal: Negative for abdominal distention, abdominal pain, constipation, diarrhea, nausea and vomiting.   Genitourinary: Negative for difficulty urinating, dysuria, frequency and urgency.   Musculoskeletal: Negative for arthralgias, back pain, myalgias and neck pain.   Skin: Negative for color change and rash.  "  Neurological: Negative for dizziness, weakness and headaches.   Psychiatric/Behavioral: Positive for dysphoric mood and suicidal ideas. Negative for confusion.     A complete review of systems was performed with pertinent positives and negatives noted in the HPI, and all other systems negative.    Physical Exam   BP: (!) 135/90  Pulse: 93  Temp: 97.9  F (36.6  C)  Resp: 18  Height: 175.3 cm (5' 9\")  Weight: 79.5 kg (175 lb 4.8 oz)  SpO2: 95 %  Physical Exam  Vitals and nursing note reviewed.   Constitutional:       General: He is not in acute distress.     Appearance: Normal appearance.      Comments: Unsteady gait, etoh smell     HENT:      Head: Normocephalic.      Nose: Nose normal.   Eyes:      Pupils: Pupils are equal, round, and reactive to light.   Cardiovascular:      Rate and Rhythm: Normal rate and regular rhythm.   Pulmonary:      Effort: Pulmonary effort is normal.   Abdominal:      General: There is no distension.   Musculoskeletal:         General: No deformity. Normal range of motion.      Cervical back: Normal range of motion.   Skin:     General: Skin is warm.   Neurological:      Mental Status: He is alert and oriented to person, place, and time.   Psychiatric:         Attention and Perception: Attention normal.         Mood and Affect: Mood normal.         Speech: Speech normal.         Behavior: Behavior normal.         Thought Content: Thought content is not paranoid or delusional. Thought content includes suicidal ideation. Thought content does not include homicidal ideation.         Cognition and Memory: Cognition normal.         ED Course     Mental Health Risk Assessment      PSS-3    Date and Time Over the past 2 weeks have you felt down, depressed, or hopeless? Over the past 2 weeks have you had thoughts of killing yourself? Have you ever attempted to kill yourself? When did this last happen? User   04/09/22 1838 yes yes yes more than 6 months ago MQT      C-SSRS ([place])    Date and " Time Q1 Wished to be Dead (Past Month) Q2 Suicidal Thoughts (Past Month) Q3 Suicidal Thought Method Q4 Suicidal Intent without Specific Plan Q5 Suicide Intent with Specific Plan Q6 Suicide Behavior (Lifetime) Within the Past 3 Months? RETIRED: Level of Risk per Screen Screening Not Complete User   04/09/22 2329 yes yes yes yes no yes -- -- -- MQT              Suicide assessment completed by mental health (D.E.C., LCSW, etc.)       Results for orders placed or performed during the hospital encounter of 04/09/22   Drug abuse screen 1 urine (ED)     Status: Normal   Result Value Ref Range    Amphetamines Urine Screen Negative Screen Negative    Barbiturates Urine Screen Negative Screen Negative    Benzodiazepines Urine Screen Negative Screen Negative    Cannabinoids Urine Screen Negative Screen Negative    Cocaine Urine Screen Negative Screen Negative    Opiates Urine Screen Negative Screen Negative   Asymptomatic COVID-19 Virus (Coronavirus) by PCR Nasopharyngeal     Status: Normal    Specimen: Nasopharyngeal; Swab   Result Value Ref Range    SARS CoV2 PCR Negative Negative    Narrative    Testing was performed using the zoey  SARS-CoV-2 & Influenza A/B Assay on the zoey  Lexi  System.  This test should be ordered for the detection of SARS-COV-2 in individuals who meet SARS-CoV-2 clinical and/or epidemiological criteria. Test performance is unknown in asymptomatic patients.  This test is for in vitro diagnostic use under the FDA EUA for laboratories certified under CLIA to perform moderate and/or high complexity testing. This test has not been FDA cleared or approved.  A negative test does not rule out the presence of PCR inhibitors in the specimen or target RNA in concentration below the limit of detection for the assay. The possibility of a false negative should be considered if the patient's recent exposure or clinical presentation suggests COVID-19.  [facility] are certified under the  Clinical Laboratory Improvement Amendments of 1988 (CLIA-88) as qualified to perform moderate and/or high complexity laboratory testing.   Comprehensive metabolic panel     Status: Abnormal   Result Value Ref Range    Sodium 140 133 - 144 mmol/L    Potassium 4.2 3.4 - 5.3 mmol/L    Chloride 107 94 - 109 mmol/L    Carbon Dioxide (CO2) 24 20 - 32 mmol/L    Anion Gap 9 3 - 14 mmol/L    Urea Nitrogen 27 7 - 30 mg/dL    Creatinine 0.70 0.66 - 1.25 mg/dL    Calcium 8.4 (L) 8.5 - 10.1 mg/dL    Glucose 105 (H) 70 - 99 mg/dL    Alkaline Phosphatase 78 40 - 150 U/L    AST 33 0 - 45 U/L    ALT 41 0 - 70 U/L    Protein Total 7.5 6.8 - 8.8 g/dL    Albumin 3.5 3.4 - 5.0 g/dL    Bilirubin Total 0.6 0.2 - 1.3 mg/dL    GFR Estimate >90 >60 mL/min/1.73m2   CBC with platelets and differential     Status: Abnormal   Result Value Ref Range    WBC Count 6.5 4.0 - 11.0 10e3/uL    RBC Count 4.38 (L) 4.40 - 5.90 10e6/uL    Hemoglobin 14.6 13.3 - 17.7 g/dL    Hematocrit 41.9 40.0 - 53.0 %    MCV 96 78 - 100 fL    MCH 33.3 (H) 26.5 - 33.0 pg    MCHC 34.8 31.5 - 36.5 g/dL    RDW 12.4 10.0 - 15.0 %    Platelet Count 307 150 - 450 10e3/uL    % Neutrophils 45 %    % Lymphocytes 41 %    % Monocytes 9 %    % Eosinophils 4 %    % Basophils 1 %    % Immature Granulocytes 0 %    NRBCs per 100 WBC 0 <1 /100    Absolute Neutrophils 2.9 1.6 - 8.3 10e3/uL    Absolute Lymphocytes 2.7 0.8 - 5.3 10e3/uL    Absolute Monocytes 0.6 0.0 - 1.3 10e3/uL    Absolute Eosinophils 0.2 0.0 - 0.7 10e3/uL    Absolute Basophils 0.1 0.0 - 0.2 10e3/uL    Absolute Immature Granulocytes 0.0 <=0.4 10e3/uL    Absolute NRBCs 0.0 10e3/uL   Alcohol breath test POCT     Status: Abnormal   Result Value Ref Range    Alcohol Breath Test 0.211 (A) 0.00 - 0.01   Urine Drugs of Abuse Screen     Status: Normal    Narrative    The following orders were created for panel order Urine Drugs of Abuse Screen.  Procedure                               Abnormality         Status                      ---------                               -----------         ------                     Drug abuse screen 1 urin...[[id number]]  Normal              Final result                 Please view results for these tests on the individual orders.   CBC with platelets differential     Status: Abnormal    Narrative    The following orders were created for panel order CBC with platelets differential.  Procedure                               Abnormality         Status                     ---------                               -----------         ------                     CBC with platelets and d...[[id number]]  Abnormal            Final result                 Please view results for these tests on the individual orders.     Medications   diazepam (VALIUM) tablet 5-20 mg (5 mg Oral Given 4/10/22 0230)   thiamine (B-1) tablet 100 mg (has no administration in time range)   folic acid (FOLVITE) tablet 1 mg (has no administration in time range)   multivitamin w/minerals (THERA-VIT-M) tablet 1 tablet (has no administration in time range)   diphenhydrAMINE (BENADRYL) capsule 50 mg (50 mg Oral Given 4/10/22 0348)        Assessments & Plan (with Medical Decision Making)   Patient presents to the ED seeking alcohol detox.  Blood alcohol on arrival is 0.211.  Also feels depressed with passive suicidality without plan.    On arrival, patient has normal vital signs.  Is clinically intoxicated.  Exam without evidence of trauma, coingestions, or other clinical toxidrome.  Plan for admission to the detox service.  We will start [facility] protocol as needed.    Prior to detox admission, patient underwent mental health exam given passive suicidality.  Depression most likely related to his alcohol abuse.   does not feel that patient is at acute risk.  Can proceed with detox admission.        I have reviewed the nursing notes. I have reviewed the findings, diagnosis, plan and need for follow up with the patient.    New Prescriptions    No  medications on file       Final diagnoses:   Alcohol abuse     Zaid CHANG, am serving as a trained medical scribe to document services personally performed by Ra Plata DO, based on the provider's statements to me.     IRa DO, was physically present and have reviewed and verified the accuracy of this note documented by Zaid Webber.    --  Ra Plata DO  [facility] EMERGENCY DEPARTMENT  4/9/2022     Ra Plata DO  04/10/22 0715

## 2022-04-10 NOTE — ED NOTES
"ED to Behavioral Floor Handoff    SITUATION  Cong Tuttle is a 31 year old male who speaks English and lives in a home alone The patient arrived in the ED by private car from home with a complaint of Alcohol Problem (per patient he was sober for 2 months. relapsed on March 14 of this year. He has been daily since. Drinks about 1.75L of vodka. Last drink was an hour ago. Seeking detox) and Depression (\"I don't wanna be alive. I do't think I can actually kill myself but at the same time I dont wanna be alive.\" patient said \"I don't know \" when asked if he is suicidal)  .The patient's current symptoms started/worsened 1 week(s) ago and during this time the symptoms have increased.   In the ED, pt was diagnosed with   Final diagnoses:   Alcohol abuse        Initial vitals were: BP: (!) 135/90  Pulse: 93  Temp: 97.9  F (36.6  C)  Resp: 18  Height: 175.3 cm (5' 9\")  Weight: 79.5 kg (175 lb 4.8 oz)  SpO2: 95 %   --------  Is the patient diabetic? No   If yes, last blood glucose? --     If yes, was this treated in the ED? --  --------  Is the patient inebriated (ETOH) Yes or Impaired on other substances? No  MSSA done? Yes  Last MSSA score: --    Were withdrawal symptoms treated? Yes  Does the patient have a seizure history? No. If yes, date of most recent seizure--  --------  Is the patient patient experiencing suicidal ideation? reports occasional suicidal thoughts representing feeling that life is not worth feeling    Homicidal ideation? denies current or recent homicidal ideation or behaviors.    Self-injurious behavior/urges? denies current or recent self injurious behavior or ideation.  ------  Was pt aggressive in the ED No  Was a code called No  Is the pt now cooperative? Yes  -------  Meds given in ED:   Medications   diazepam (VALIUM) tablet 5-20 mg (5 mg Oral Given 4/10/22 0230)   thiamine (B-1) tablet 100 mg (has no administration in time range)   folic acid (FOLVITE) tablet 1 mg (has no administration in " time range)   multivitamin w/minerals (THERA-VIT-M) tablet 1 tablet (has no administration in time range)   diphenhydrAMINE (BENADRYL) capsule 50 mg (50 mg Oral Given 4/10/22 3604)      Family present during ED course? No  Family currently present? No    BACKGROUND  Does the patient have a cognitive impairment or developmental disability? No  Allergies: No Known Allergies.   Social demographics are   Social History     Socioeconomic History     Marital status: Single     Spouse name: None     Number of children: None     Years of education: None     Highest education level: None   Tobacco Use     Smoking status: Former Smoker     Packs/day: 0.00     Smokeless tobacco: Current User     Types: Chew     Tobacco comment: e cig   Substance and Sexual Activity     Alcohol use: Yes     Comment: daily- last drink was today     Drug use: Not Currently     Sexual activity: Not Currently        ASSESSMENT  Labs results   Labs Ordered and Resulted from Time of ED Arrival to Time of ED Departure   COMPREHENSIVE METABOLIC PANEL - Abnormal       Result Value    Sodium 140      Potassium 4.2      Chloride 107      Carbon Dioxide (CO2) 24      Anion Gap 9      Urea Nitrogen 27      Creatinine 0.70      Calcium 8.4 (*)     Glucose 105 (*)     Alkaline Phosphatase 78      AST 33      ALT 41      Protein Total 7.5      Albumin 3.5      Bilirubin Total 0.6      GFR Estimate >90     CBC WITH PLATELETS AND DIFFERENTIAL - Abnormal    WBC Count 6.5      RBC Count 4.38 (*)     Hemoglobin 14.6      Hematocrit 41.9      MCV 96      MCH 33.3 (*)     MCHC 34.8      RDW 12.4      Platelet Count 307      % Neutrophils 45      % Lymphocytes 41      % Monocytes 9      % Eosinophils 4      % Basophils 1      % Immature Granulocytes 0      NRBCs per 100 WBC 0      Absolute Neutrophils 2.9      Absolute Lymphocytes 2.7      Absolute Monocytes 0.6      Absolute Eosinophils 0.2      Absolute Basophils 0.1      Absolute Immature Granulocytes 0.0       "Absolute NRBCs 0.0     ALCOHOL BREATH TEST POCT - Abnormal    Alcohol Breath Test 0.211 (*)    DRUG ABUSE SCREEN 1 URINE (ED) - Normal    Amphetamines Urine Screen Negative      Barbiturates Urine Screen Negative      Benzodiazepines Urine Screen Negative      Cannabinoids Urine Screen Negative      Cocaine Urine Screen Negative      Opiates Urine Screen Negative     COVID-19 VIRUS (CORONAVIRUS) BY PCR - Normal    SARS CoV2 PCR Negative        Imaging Studies: No results found for this or any previous visit (from the past 24 hour(s)).   Most recent vital signs /84   Pulse 98   Temp 97.6  F (36.4  C) (Oral)   Resp 18   Ht 1.753 m (5' 9\")   Wt 79.5 kg (175 lb 4.8 oz)   SpO2 96%   BMI 25.89 kg/m     Abnormal labs/tests/findings requiring intervention:---   Pain control: pt had none  Nausea control: pt had none    RECOMMENDATION  Are any infection precautions needed (MRSA, VRE, etc.)? No If yes, what infection? --  ---  Does the patient have mobility issues? independently. If yes, what device does the pt use? ---  ---  Is patient on 72 hour hold or commitment? No If on 72 hour hold, have hold and rights been given to patient? N/A  Are admitting orders written if after 10 p.m. ?N/A  Tasks needing to be completed:---     Lucina Javed, RN    [phone] [facility]   [phone] [facility] ED  "

## 2022-04-10 NOTE — DISCHARGE INSTRUCTIONS
Behavioral Discharge Planning and Instructions  THANK YOU FOR CHOOSING [address]  [phone]    Summary: You were admitted to [facility] on 4/10/2022 for detoxification from alcohol.  A medical exam was performed that included lab work. You have met with a  and opted to attend [facility].  Please take care and make your recovery a daily priority, Cong!  It was a pleasure working with you and the entire treatment team here wishes you the very best in your recovery!     Recommendation:  Enter and complete a residential treatment program such as [facility] and follow all continuing care recommendations.     Main Diagnoses:  Per Dr. Cathy Perdue MD;  Suicidal ideation   Major Depressive Disorder, recurrent, severe without psychotic features  Alcohol use disorder, severe  Alcohol withdrawal   Non-suicidal self injury   Borderline Personality Disorder per chart  Generalized Anxiety Disorder  PTSD  Unspecified eating disorder  Elevated BP  Hypocalcemia    Major Treatments, Procedures and Findings:  You were treated for alcohol detoxification using valium administered based on the [facility] protocol. You completed a chemical dependency assessment. You had labs drawn and those results were reviewed with you. Please take a copy of your lab work with you to your next primary care provider appointment.    Symptoms to Report:  If you experience more anxiety, confusion, sleeplessness, deep sadness or thoughts of suicide, notify your treatment team or notify your primary care provider. IF ANY OF THE SYMPTOMS YOU ARE EXPERIENCING ARE A MEDICAL EMERGENCY CALL 911 IMMEDIATELY.     Lifestyle Adjustment: Adjust your lifestyle to get enough sleep, relaxation, exercise and good nutrition. Continue to develop healthy coping skills to decrease stress and promote a sober living environment. Do not use mood altering substances including alcohol, illegal drugs or addictive medications other than what is  currently prescribed.     Disposition: [facility]  Admit date/time: Wednesday 4/13/22 @ 12:45 PM    Facts about COVID19 at www.cdc.gov/COVID19 and www.[place].gov/covid19    Keeping hands clean is one of the most important steps we can take to avoid getting sick and spreading germs to others.  Please wash your hands frequently and lather with soap for at least 20 seconds!    Follow-up Appointment:   With your PCP Ronnie Atkins   On 5/3/2022 @ 9:00AM  [facility]  [address] [place] [address]  Get directions  [phone]    Recovery apps for your phone to locate current in person and zoom recovery meetings  [place] - meeting nicko  AA  - meeting nicko  Meeting guide - meeting nicko  Quick NA meeting - meeting nicko  Gopal- has various apps    Resources:  *due to covid-19 most AA/NA meetings are being held online however some are in-person or a hybrid combination please check online to verify*  AA meetings search for them at: https://aa-intergroup.org (worldwide meeting listings)  AA meetings for [place] area can be found online at: https://a[place].org (click local online meetings listings)  NA meetings for [place] area can be found online at: https://www.nam[place].org  (click find a meeting)  AA and NA Sponsors are excellent resources for support and you can find one at any support group meeting.   Alcoholics Anonymous (https://aa.org/): for information 24 hours/day  AA Intergroup service office in [place] (http://www.aastpa[place].org/) 116.323.1800  AA Intergroup service office in [facility]: 892.722.8916. (http://www.a[place].org/)  Narcotics Anonymous (www.nam[place].org) (428) 612-8438  https://aafairviewriverside.org/meetings  SMART Recovery - self management for addiction recovery:  www.Vadiorecovery.org  Pathways ~ A Health Crisis Resource & Support Center:   716.715.3296.  https://prescribetoprevent.org/patient-education/videos/  http://www.harmreduction.org  [facility] 623-510-4831  Support Group:  AA/NA and Sponsor/support.  National [place] on Mental Illness (www.[place].org): 210.654.4374 or 742-894-0754.  Alcoholics Anonymous (www.alcoholics-anonymous.org): Check your phone book for your local chapter.  Suicide Awareness Voices of Education (SAVE) (www.save.org): 223-075-YJTT (8997)  National Suicide Prevention Line (www.mentalhealthmn.org): 306-567-XZUG (0824)  Mental Health Consumer/Survivor Network of [place] (www.mhcsn.net): 471.496.1433 or 632-596-7908  Mental Health Association of [place] (www.mentalhealth.org): 226.726.7264 or 276-196-1048   Substance Abuse and Mental Health Services (www.samhsa.gov)  [place] Opioid Prevention Coalition: www.opioidcoalition.org    [place] Recovery Connection ([facility])  [facility] connects people seeking recovery to resources that help foster and sustain long-term recovery.  Whether you are seeking resources for treatment, transportation, housing, job training, education, health care or other pathways to recovery, [facility] is a great place to start.  350.599.5846.  www.[place]Boostable.[place]    Great Pod casts for nutrition and wellness  Listen on Apple Podcasts  Dishing Up Nutrition   [employer]   Nutrition       Understand the connection between what you eat and how you feel. Hosted by licensed nutritionists and dietitians from [employer] we share practical, real-life solutions for healthier living through nutrition.     General Medication Instructions:   See your medication sheet(s) for instructions.   Take all medications as prescribed.  Make no changes unless your primary care provider suggests them.   Go to all your primary care provider visits.  Be sure to have all your required lab tests. This way, your medicines can be refilled on time.  Do not use any forms of alcohol.    Please  Note:  If you have any questions at anytime after you are discharged please call [facility] detox unit [facility] at [phone].  [facility], Behavioral Intake [phone]  Medical Records call [phone]  Outpatient Behavioral Intake call [phone]  LP+ Wait List/Bed Availability call [phone]    Please remember to take all of your behavioral discharge planning and lab paperwork to any follow up appointments, it contains your lab results, diagnosis, medication list and discharge recommendations.      THANK YOU FOR CHOOSING [facility]
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
## 2022-04-10 NOTE — PROGRESS NOTES
04/10/22 1014   Patient Belongings   Did you bring any home meds/supplements to the hospital?  No   Patient Belongings other (see comments)   Patient Belongings Put in Hospital Secure Location (Security or Locker, etc.) other (see comments)   Belongings Search Yes   Clothing Search Yes   Second Staff CJ Leslie   Comment Items placed in storage per unit process   Storage Bin: Jacket, shoes  Med Room: Phone, wallet  Security #[id number]: 1 [place] DL, 1 credit/debit card, $10.00 CASH    A               Admission:  I am responsible for any personal items that are not sent to the safe or pharmacy.  [facility] is not responsible for loss, theft or damage of any property in my possession.    Signature:  _________________________________ Date: _______  Time: _____                                              Staff Signature:  ____________________________ Date: ________  Time: _____      2nd Staff person, if patient is unable/unwilling to sign:    Signature: ________________________________ Date: ________  Time: _____     Discharge:  [facility] has returned all of my personal belongings:    Signature: _________________________________ Date: ________  Time: _____                                          Staff Signature:  ____________________________ Date: ________  Time: _____

## 2022-04-10 NOTE — PROGRESS NOTES
Writer met with patient to discuss aftercare plans. He reports he would like to go to [facility]. Patient has turned in his CD assessment paperwork, Monday CM to follow up with.

## 2022-04-10 NOTE — PHARMACY-ADMISSION MEDICATION HISTORY
Admission Medication History Completed by Pharmacy    See [facility] Admission Navigator for allergy information, preferred outpatient pharmacy, prior to admission medications and immunization status.     Medication History Sources:     The patient, Surescripts (fill history), and Care Everywhere    Changes made to PTA medication list (reason):    Added:   o Lexapro 10 mg tablet (per patient, fill history, and Care Everywhere)  o Hydroxyzine 50 mg capsule (per patient, fill history, and Care Everywhere)  o Trazodone 50 mg tablet (per patient, fill history, and Care Everywhere)    Deleted:   o B complex vitamins tablet (per patient)  o Buspar 10 mg tablet (per fill history)  o Clonazepam 1 mg tablet (per fill history)  o Antabuse 250 mg tablet (per patient)  o Lorazepam 0.5 mg tablet (per fill history)  o Naltrexone 50 mg tablet (per patient and fill history)  o Nicotine 21 mg/24 hr patch (per patient and fill history)  o Nicotine polacrilex 4 mg gum (per patient)  o Effexor- mg 24 hr capsule (per patient and fill history)  o Zolpidem 12.5 mg CR tablet (per fill history)     Changed:   o None    Additional Information:    The patient was able to identify the names of his medications, but was unsure about strength, dosing, and last doses taken. Patient stated that he thinks his last doses were a few weeks ago.    The patient stated that he is currently still taking clonazepam, lorazepam, buspirone, zolpidem, and that his last doses were a few weeks ago. Could not find second source to confirm patient's report.    The patient stated that he takes no other prescription or OTC medications.    Preferred pharmacy is [id number] in [place], [place].    Prior to Admission medications    Medication Sig Last Dose Taking? Auth Provider   cholecalciferol, vitamin D3, (VITAMIN D3 ORAL) [CHOLECALCIFEROL, VITAMIN D3, (VITAMIN D3 ORAL)] Take by mouth daily. Past Month at Unknown time Yes Provider, Historical    diphenhydrAMINE (BENADRYL) 25 mg tablet [DIPHENHYDRAMINE (BENADRYL) 25 MG TABLET] Take 25 mg by mouth at bedtime as needed for sleep. Past Month at Unknown time Yes Provider, Historical   escitalopram (LEXAPRO) 10 MG tablet Take 10 mg by mouth in the morning. Past Month at Unknown time Yes Unknown, Entered By History   fish oil-omega-3 fatty acids 300-1,000 mg capsule [FISH OIL-OMEGA-3 FATTY ACIDS 300-1,000 MG CAPSULE] Take 1 g by mouth daily. Past Month at Unknown time Yes Provider, Historical   hydrOXYzine (VISTARIL) 50 MG capsule Take 50 mg by mouth as needed in the morning and 50 mg as needed at noon and 50 mg as needed in the evening and 50 mg as needed before bedtime. Past Month at Unknown time Yes Unknown, Entered By History   Lactobacillus rhamnosus GG (CULTURELLE) 15 billion cell CpSP [LACTOBACILLUS RHAMNOSUS GG (CULTURELLE) 15 BILLION CELL CPSP] Take 1 capsule by mouth daily. Past Month at Unknown time Yes Provider, Historical   multivitamin therapeutic tablet [MULTIVITAMIN THERAPEUTIC TABLET] Take 1 tablet by mouth daily. Past Month at Unknown time Yes Provider, Historical   traZODone (DESYREL) 50 MG tablet Take  mg by mouth nightly as needed for sleep Past Month at Unknown time Yes Unknown, Entered By History       Date completed: 04/10/22    Medication history completed by: Mandi Garcia

## 2022-04-10 NOTE — TELEPHONE ENCOUNTER
S: 31/M, [place] [facility], ETOH detox and SI    B: Pt endorses drinking 1-1.75 L of hard liquor daily. Pt reports he was sober for a few months but relapsed recently. LD 1 hour PTA. Breathalyser 0.211. No hx of DTs or seizures. Pt denies other drug use - UDS negative. No acute medical concerns. Medically cleared, eating, drinking, ambulating independently. Hx of anxiety and depression. Pt reports SI but does not have a plan - pt is going to be assessed by DEC.     A: Vol    CMP: Calcium 8.4 / Glucose 105  CBC: RBC 4.38 / MCH 33.3  Covid neg    R: 3A / Veluvali / MICD  04:24 - Received call from Dr. Plata who reports that pt is medically cleared and assessed by DEC - pt can contract for safety and SI more related to his drinking. 04:28 - Received call from DEC who reports that pt endorses SI w/out plan and contracts for safety. 05:21 - Paged on call. 05:23 - On call accepts for 3A. Placed pt in queue. 05:28 - Called 3A. Charge RN on break. Left message to have her call intake when able. 05:51 - Notified 3A RN who will call ED for report. 06:02 - Notified ED.     Patient cleared and ready for behavioral bed placement: Yes

## 2022-04-11 LAB
CHOLEST SERPL-MCNC: 213 MG/DL
GGT SERPL-CCNC: 103 U/L (ref 0–75)
HDLC SERPL-MCNC: 91 MG/DL
HOLD SPECIMEN: NORMAL
LDLC SERPL CALC-MCNC: 79 MG/DL
NONHDLC SERPL-MCNC: 122 MG/DL
TRIGL SERPL-MCNC: 215 MG/DL
TSH SERPL DL<=0.005 MIU/L-ACNC: 1.6 MU/L (ref 0.4–4)

## 2022-04-11 PROCEDURE — 82977 ASSAY OF GGT: CPT | Performed by: NURSE PRACTITIONER

## 2022-04-11 PROCEDURE — 250N000013 HC RX MED GY IP 250 OP 250 PS 637: Performed by: PSYCHIATRY & NEUROLOGY

## 2022-04-11 PROCEDURE — H2032 ACTIVITY THERAPY, PER 15 MIN: HCPCS

## 2022-04-11 PROCEDURE — 36415 COLL VENOUS BLD VENIPUNCTURE: CPT | Performed by: NURSE PRACTITIONER

## 2022-04-11 PROCEDURE — 84443 ASSAY THYROID STIM HORMONE: CPT | Performed by: NURSE PRACTITIONER

## 2022-04-11 PROCEDURE — 250N000011 HC RX IP 250 OP 636: Performed by: NURSE PRACTITIONER

## 2022-04-11 PROCEDURE — 99233 SBSQ HOSP IP/OBS HIGH 50: CPT | Performed by: PSYCHIATRY & NEUROLOGY

## 2022-04-11 PROCEDURE — 250N000013 HC RX MED GY IP 250 OP 250 PS 637: Performed by: NURSE PRACTITIONER

## 2022-04-11 PROCEDURE — 250N000013 HC RX MED GY IP 250 OP 250 PS 637: Performed by: EMERGENCY MEDICINE

## 2022-04-11 PROCEDURE — 128N000001 HC R&B CD/MH ADULT

## 2022-04-11 PROCEDURE — 80061 LIPID PANEL: CPT | Performed by: NURSE PRACTITIONER

## 2022-04-11 RX ORDER — ESCITALOPRAM OXALATE 20 MG/1
20 TABLET ORAL DAILY
Status: DISCONTINUED | OUTPATIENT
Start: 2022-04-12 | End: 2022-04-13 | Stop reason: HOSPADM

## 2022-04-11 RX ORDER — ESCITALOPRAM OXALATE 10 MG/1
10 TABLET ORAL DAILY
Status: COMPLETED | OUTPATIENT
Start: 2022-04-11 | End: 2022-04-11

## 2022-04-11 RX ADMIN — ESCITALOPRAM OXALATE 10 MG: 10 TABLET ORAL at 10:29

## 2022-04-11 RX ADMIN — MELATONIN TAB 3 MG 3 MG: 3 TAB at 19:12

## 2022-04-11 RX ADMIN — ONDANSETRON 4 MG: 4 TABLET, ORALLY DISINTEGRATING ORAL at 02:12

## 2022-04-11 RX ADMIN — PRAZOSIN HYDROCHLORIDE 1 MG: 1 CAPSULE ORAL at 22:38

## 2022-04-11 RX ADMIN — HYDROXYZINE HYDROCHLORIDE 50 MG: 25 TABLET, FILM COATED ORAL at 22:38

## 2022-04-11 RX ADMIN — ESCITALOPRAM OXALATE 10 MG: 10 TABLET ORAL at 08:50

## 2022-04-11 RX ADMIN — FOLIC ACID 1 MG: 1 TABLET ORAL at 08:50

## 2022-04-11 RX ADMIN — NICOTINE POLACRILEX 4 MG: 4 GUM, CHEWING BUCCAL at 19:12

## 2022-04-11 RX ADMIN — NICOTINE POLACRILEX 4 MG: 4 GUM, CHEWING BUCCAL at 16:12

## 2022-04-11 RX ADMIN — HYDROXYZINE HYDROCHLORIDE 50 MG: 25 TABLET, FILM COATED ORAL at 16:12

## 2022-04-11 RX ADMIN — TRAZODONE HYDROCHLORIDE 50 MG: 50 TABLET ORAL at 02:12

## 2022-04-11 RX ADMIN — TRAZODONE HYDROCHLORIDE 50 MG: 50 TABLET ORAL at 22:38

## 2022-04-11 RX ADMIN — DIAZEPAM 10 MG: 5 TABLET ORAL at 02:12

## 2022-04-11 RX ADMIN — MULTIPLE VITAMINS W/ MINERALS TAB 1 TABLET: TAB at 08:50

## 2022-04-11 RX ADMIN — THIAMINE HCL TAB 100 MG 100 MG: 100 TAB at 08:50

## 2022-04-11 RX ADMIN — HYDROXYZINE HYDROCHLORIDE 50 MG: 25 TABLET, FILM COATED ORAL at 08:50

## 2022-04-11 ASSESSMENT — ACTIVITIES OF DAILY LIVING (ADL)
HYGIENE/GROOMING: INDEPENDENT
LAUNDRY: WITH SUPERVISION
LAUNDRY: WITH SUPERVISION
DRESS: STREET CLOTHES
ORAL_HYGIENE: INDEPENDENT
HYGIENE/GROOMING: INDEPENDENT
DRESS: INDEPENDENT
ORAL_HYGIENE: INDEPENDENT

## 2022-04-11 NOTE — PROGRESS NOTES
CLINICAL NUTRITION SERVICES - ASSESSMENT NOTE     Nutrition Prescription    RECOMMENDATIONS FOR MDs/PROVIDERS TO ORDER:  1. Continue thiamine, folic acid, and MVT.  2. Referral to inpatient eating disorder treatment program.  3. Social work consult to help with financial situation regarding [facility]?    Malnutrition Status:    Patient does not meet two of the established criteria necessary for diagnosing malnutrition but is at risk for malnutrition    Recommendations already ordered by Registered Dietitian (RD):  1. BLIND weights only.    Future/Additional Recommendations:  Monitor PO intake, need for supplements, wt trends     REASON FOR ASSESSMENT  Cong Tuttle is a/an 31 year old male assessed by the dietitian for Provider Order - Pt with eating disorder history, restricting and purging, excessive exercise and laxative abuse; please evaluate and advise, thanks!    PMH  Cong Tuttle is a 31 year old male with PMH significant for polysubstance abuse, alcohol use disorder, JOSSIE, and depression who presents to the ED seeking detox from alcohol and for evaluation of depression.  Patient states he has been drinking 1/2 to 1 L of alcohol per day since the middle of March.  Last drink was proximately 1 hour prior to arrival.  Denies history of withdrawal seizures.  Has been to detox before would like to be readmitted.  Also has severe depression related to his continued alcohol use.  Does not have suicidal plan but sometimes feels that he does not want to be alive.  He has had a hard time holding down a job.  He does not have a specific plan.  Does have history of 1 prior suicide attempt.    NUTRITION HISTORY  Cong reports history of eating disorder 2/2 restricting and extreme exercise. He has had treatment with [facility], [facility], and was seeing an outpatient dietitian at [facility]. He ultimately stopped seeing her around January because he didn't agree with her interventions of  "increasing how many meals he should be eating per day.    He reports typical weight being 160-168# after discharge from eating disorder treatment center.     His typical intake includes 1 meal/day and he runs 8miles/day. When he was using alcohol and other substances, he was eating (large pizzas, chicken wings, crab, Chipotle burritos) and didn't feel the stress of eating calories. He also stopped going to the gym ~1mo ago and claims he gained 10-15# during this time. However, now he feels pressure to lose weight by restricting (wants to get back to ~160#).    CURRENT NUTRITION ORDERS  Diet: Regular  Intake/Tolerance: 90% of meals during admission per patient    LABS  Labs reviewed 4/11  TG 215H   Cholesterol 213H  GGT 103H    MEDICATIONS  Medications reviewed 4/11  Folic acid 1mg  Melatonin 3mg  Thera-vit-M  Thiamine 100mg  Minipress  Valium  Atarax  Zofran  Desyrel    ANTHROPOMETRICS  Height: 175.3 cm (5' 9\")  Most Recent Weight: 79.5 kg (175 lb 4.8 oz)    IBW: 72.8 kg  %IBW: 109%  BMI: Overweight BMI 25-29.9  Weight History:   Wt Readings from Last 10 Encounters:   04/09/22 79.5 kg (175 lb 4.8 oz)   02/25/20 84.4 kg (186 lb)   11/01/19 68.9 kg (152 lb)   10/03/19 68.9 kg (152 lb)     Dosing Weight: 79.5 kg    ASSESSED NUTRITION NEEDS  Estimated Energy Needs: 1988-2385+ kcals/day (25 - 30+ kcals/kg)  Justification: Maintenance and Repletion  Estimated Protein Needs: 64-80 grams protein/day (0.8 - 1 grams of pro/kg)  Justification: Maintenance  Estimated Fluid Needs: 8328-5790 mL/day (1 mL/kcal)   Justification: Maintenance    PHYSICAL FINDINGS  See malnutrition section below.     MALNUTRITION  % Intake: < 75% for >/= 3 months (moderate)  % Weight Loss: None noted  Subcutaneous Fat Loss: Unable to assess  Muscle Loss: Unable to assess  Fluid Accumulation/Edema: Unable to assess  Malnutrition Diagnosis: Patient does not meet two of the established criteria necessary for diagnosing malnutrition but is at risk for " malnutrition    NUTRITION DIAGNOSIS  Predicted inadequate nutrient intake (kcal, protein) related to eating disorder as evidenced by restricted PO intake, excessive exercise, obsession with weight trends.    INTERVENTIONS  Implementation  Provided education on RDN role in care, nutrient needs, metabolism changes, disordered eating patterns including exercise  Multivitamin/mineral supplement therapy -- will monitor for need    Goals  Patient to consume % of nutritionally adequate meal trays TID, or the equivalent with supplements/snacks.     Monitoring/Evaluation  Progress toward goals will be monitored and evaluated per protocol.  Luly Landry, MPH, RDN, LD  Behavioral Clinical Dietitian  Mental Health Pager (M-F): [phone]  On Call Pager (weekends only): [phone]

## 2022-04-11 NOTE — PROGRESS NOTES
PDMP as of 4/11/2022:     Cong Tuttle, 31M  Refine Search  Contact the [employer]  YOB: 1990  Recent Address:  [address] [place] [address]  View Linked Records (3)  Other Tools/Metrics  Narx[facility]  Report generated on 04/11/2022. Report Date Range: 04/11/2021 - 04/11/2022  PDF Report  [place]  Narx Scores  Narcotic  080  Sedative  090  Stimulant  000  Explanation and Guidance  Overdose Risk Score  290  (Range 000-999)  Explanation and Guidance  State Indicators (0)  Details  RX Graph   Narcotic   Buprenorphine   Sedative   Stimulant   Other  Learn how to use graph  All Prescribers  Prescribers  4 - Rick Izaguirre Ii  3 - Mary Hoyos  2 - Divya Hitchcock  1 - Salvador Webster,  Timeline  04/11  2m  6m  1y  2y  Disclaimer  Morphine Milligram Equivalent Prescribed Over Time  Last 30 Days  Last 60 Days  Last 90 Days  Last 1 Year  Last 2 Years  MME  Timeframe  0  1  2  3/13/22  3/28/22  4/11/22  0  MME per Day Avg.  0  MME per RX  Disclaimer  Lorazepam MgEq (LME) Prescribed Over Time  Last 30 Days  Last 60 Days  Last 90 Days  Last 1 Year  Last 2 Years  LME  Timeframe  0  1  2  3/13/22  3/28/22  4/11/22  0  LME Per Day Avg.  0  LME mg Per Rx  Disclaimer  Buprenorphine (mg) Prescribed Over Time  Last 30 Days  Last 60 Days  Last 90 Days  Last 1 Year  Last 2 Years  mg  Timeframe  0  1  2  3/13/22  3/28/22  4/11/22  0  mg Per Day Avg.  0  Avg mg Per Rx  Disclaimer  RX Summary  Summary  Total Prescriptions 4  Total Private Pay 0  Total Prescribers 4  Total Pharmacies 4  Opioids* (excluding Buprenorphine)  Current Qty 0  Current MME/day 0.00  30 Day Avg MME/day 0.00  Buprenorphine*  Current Qty 0  Current mg/day 0.00  30 Day Avg mg/day 0.00  RX Summary Expanded  Narcotics (excluding Buprenorphine)  30 Day Avg. MME 0.00  90 Day Avg. MME 0.83  Rx Count/12 Months 1  Prescriber #/6 Months 1  Pharmacy #/6 Months 1  Current Quantity 0  Buprenorphine  30 Day Avg.  mg/day 0.00  90 Day Avg. mg/day 0.00  Rx Count/12 Months 0  Prescriber #/6 Months 0  Pharmacy #/6 Months 0  Current Quantity 0  Sedatives  30 Day Avg. LME 0.00  90 Day Avg. LME 0.00  Rx Count/12 Months 1  Prescriber #/6 Months 0  Pharmacy #/6 Months 0  Current Quantity 0  Stimulants  30 Day Avg. mg/day 0.00  90 Day Avg. mg/day 0.00  Rx Count/12 Months 0  Prescriber #/6 Months 0  Pharmacy #/6 Months 0  Current Quantity 0  Prescriptions  Total: 4  Private Pay: 0  Showing 1-4 of 4 Items View 15 Items  1 of 1   Filled  Written  Sold  ID  Drug  QTY  Days  Prescriber  RX #  Dispenser  Refill  Daily Dose*  Pymt Type     01/24/2022 01/24/2022 01/24/2022 2   Gabapentin 300 Mg Capsule  90.00 30 Do Wig [id number] Reg (6199) 0/0  Comm Ins [place]  01/17/2022 01/17/2022 01/17/2022 1   Oxycodone Hcl (Ir) 5 Mg Tablet  10.00 1 Va Mar [id number] All (3545) 0/0 75.00 MME [place]  08/29/2021 08/29/2021 08/29/2021 1   Gabapentin 300 Mg Capsule  180.00 30  Sie [id number] Gra (8614) 0/0  Medicaid [place]  04/14/2021 04/14/2021  3   Zolpidem Tartrate 10 Mg Tablet  30.00 30 Je Gur [id number] Gen (2768) 0/0 0.50 LME [place]  Disclaimer  Showing 1-4 of 4 Items View 15 Items  1 of 1   Providers  Total: 4  Showing 1-4 of 4 Items View 15 Items  1 of 1   Name  Address  City  State  Zipcode  Phone   Mary Sebastian Pa-C [address] [place] [address] [phone]  Rick Izaguirre Ii,  [address] [place] [address] [phone]  Salvador Webster  [address] [facility] [address] [phone]  Divya Hitchcock [address] [place] [facility] [address] [phone]  Showing 1-4 of 4 Items View 15 Items  1 of 1   Pharmacies  Total: 4  Showing 1-4 of 4 Items View 15 Items  1 of 1   Name  Address  City  State  Zipcode  Phone   [facility] [phone]) [address] [place] [address] [phone]  [facility] ([phone]) [address] [place] [address] [phone]  [facility]  [place] ([phone]) [address] [facility] [address] [phone]  [facility] ([phone]) [address] [place] [address] [phone]  Showing 1-4 of 4 Items View 15 Items  1 of 1   The report provided is based upon the search criteria entered and the corresponding data as it has been reported by dispenser(s). If erroneous information is identified or additional information is needed, please contact the dispenser or the prescriber provided on the report. Date Sold signifies the date the prescription was sold (left the pharmacy). The absence of Date Sold does not necessarily indicate the prescription was not dispensed. Fill Date represents the date the medication was filled or prepared by the pharmacy. Note, federal regulation (CFR Title 42: Part 2) requires patient consent prior to releasing certain patient data from federally funded opioid treatment programs (OTPs). As such, controlled substances dispensed from OTPs for medication-assisted treatment may not appear in the [place]  report. Morphine milligram equivalent (MME) conversion factors published by the CDC are used in the MME calculation. Per the CDC, the MME conversion factor is intended only for analytic purposes where prescription data are used to retrospectively calculate daily MME to inform analyses of risks associated with opioid prescribing. This value does not constitute clinical guidance or recommendations for converting patients from one form of opioid analgesic to another. Per the CDC, the conversion factors for drugs prescribed or provided, as part of medication-assisted treatment for opioid use disorder should not be used to benchmark against MME dosage thresholds meant for opioids prescribed for pain. Buprenorphine products listed in the CDC s MME file do not have an associated conversion factor. Lastly, the CDC notes, in clinical practice, calculating MME for methadone often involves a sliding-scale approach, whereby the  conversion factor increases with increasing dose. The conversion factor of 3 for methadone presented in this file could underestimate MME for a given patient. This report contains confidential information, including patient identifiers, and is not a public record. The information on this report must be treated as protected health information and is only to be disclosed to others as authorized by applicable state and Federal regulations.

## 2022-04-11 NOTE — PLAN OF CARE
"Goal Outcome Evaluation:    Plan of Care Reviewed With: patient     Overall Patient Progress: improving    Outcome Evaluation: Pt is in alcohol withdrawal monitoring but is not requiring any medication to treat withdrawal.    Pt MSSA score today is a 6, no valium indicated. States \"I don't think my withdrawal is that bad\" today. Last valium today at 0212. Pt endorses anxiety 8, depression 6 of 10 in severity. Administered scheduled medications along with PRN hydroxyzine. Denies any suicidal ideation plans or intent. States willingness to alert staff if he develops any plans of SI/SIB.     Will continue to monitor and intervene as warranted.       "

## 2022-04-11 NOTE — PROGRESS NOTES
"Case Management Note  4/11/2022    Checked in with pt to discuss discharge planning. Pt reports he is hoping to attend [facility]. Pt completed CD assessment paperwork. Explained to pt next steps in referral process. Plan to complete CD assessment today.     Addendum: Unable to complete CD assessment today due to time constraints. Per LP, bed available for pt Wednesday. Discussed with pt his MH history, screened for barriers for LP. Pt reports he does not believe his MH will be a concern. Pt reports when he is sober he manages his MH symptoms well, but when he drinks they worsen. Pt reports as long as he is sober he should be able to manage MH symptoms. Pt reports he has not had issues with self-harm mostly since October 2019. Pt reports in Nov 2021 he briefly engaged in some self-harm but not \"seriously\" as he had in the past. Pt reports no current SI. Pt reports a history of eating disorder, but reports no active symptoms for approx 6 months. Pt reports a month ago he did restrict a little bit but hasn't really the past month. Pt reports he is worried because he gained weight due to his ETOH use the past month, but wants to work with a dietician on this. Pt reports 6 months ago he used a laxative to purge, but has not done that in the past 6 months. Pt reports he has not purged at all while on 3A and has been eating all of his meals. Pt reports he has done [facility] in the past and wants to work with their dietician after LP on an outpatient basis for continuing recovery, but does not feel he needs their full program.    Carol Ruano, LPCC, LADC          "

## 2022-04-11 NOTE — PLAN OF CARE
Behavioral Team Discussion: (4/11/2022)    Continued Stay Criteria/Rationale: Patient admitted for alcohol withdrawal and suicidal ideation, complicated.  Plan: The following services will be provided to the patient; psychiatric assessment, medication management, therapeutic milieu, individual and group support, and skills groups.   Participants: 3A Provider: Dr. Herlinda Teague MD; 3A RN: Rubio Rutledge RN; 3A CM's: Carol Ruano.  Summary/Recommendation: Providers will assess today for treatment recommendations, discharge planning, and aftercare plans. CM will meet with pt for discharge planning.   Medical/Physical: Internal medicine consult completed 4/10/22.  Precautions:   Behavioral Orders   Procedures     Code 1 - Restrict to Unit     Discontinue 1:1 attendant for suicide risk     Order Specific Question:   I have performed an in person assessment of the patient     Answer:   Based on this assessment the patient no longer requires a one on one attendant at this point in time.     Order Specific Question:   Rationale     Answer:   Patient States able to remain safe in hospital     Routine Programming     As clinically indicated     Self Injury Precaution     Status 15     Every 15 minutes.     Suicide precautions     Patients on Suicide Precautions should have a Combination Diet ordered that includes a Diet selection(s) AND a Behavioral Tray selection for Safe Tray - with utensils, or Safe Tray - NO utensils       Withdrawal precautions     Rationale for change in precautions or plan: N/A  Progress: Improving.

## 2022-04-11 NOTE — PLAN OF CARE
Behavioral Team Discussion: (4/11/2022)    Continued Stay Criteria/Rationale: Patient admitted for alcohol withdrawal and suicidal ideation, complicated.  Plan: The following services will be provided to the patient; psychiatric assessment, medication management, therapeutic milieu, individual and group support, and skills groups.   Participants: 3A Provider: Dr. Herlinda Teague MD; 3A RN: Rubio Rutledge RN; 3A CM's: Carol Ruano.  Summary/Recommendation: Providers will assess today for treatment recommendations, discharge planning, and aftercare plans. CM will meet with pt for discharge planning.   Medical/Physical: Internal medicine consult completed 4/10/22.  Precautions:   Behavioral Orders   Procedures     Code 1 - Restrict to Unit     Discontinue 1:1 attendant for suicide risk     Order Specific Question:   I have performed an in person assessment of the patient     Answer:   Based on this assessment the patient no longer requires a one on one attendant at this point in time.     Order Specific Question:   Rationale     Answer:   Patient States able to remain safe in hospital     Routine Programming     As clinically indicated     Self Injury Precaution     Status 15     Every 15 minutes.     Suicide precautions     Patients on Suicide Precautions should have a Combination Diet ordered that includes a Diet selection(s) AND a Behavioral Tray selection for Safe Tray - with utensils, or Safe Tray - NO utensils       Withdrawal precautions     Rationale for change in precautions or plan: N/A  Progress: No Change.  PT SEEN   AGREE WITH ASSESSMENT AND PLAN

## 2022-04-11 NOTE — PROGRESS NOTES
"[facility], [place]   Psychiatric Progress Note        Interim history   This is a 31 year old male with Major Depressive Disorder, recurrent, severe without psychotic features  Alcohol use disorder, severe  Alcohol withdrawal   Non-suicidal self injury   Borderline Personality Disorder per chart  Generalized Anxiety Disorder  PTSD  Unspecified eating disorder  Elevated BP  Hypocalcemia.Pt seen in rounds.   The patient's care was discussed with the treatment team during the daily team meeting and/or staff's chart notes were reviewed.  Staff report patient has been visible in the milieu,  no acute eventsovernight.     Patient's mood is\"feel like shit\" unmotivated   No point to go on dont want to be alive     pt tried prozac zoloft effexor   Energy Level:fine  Sleep:better  Appetite:ok   motivation interest are low   Has passive ideation no Suicidal/homicidal ideation/plan intent.psychosis  No prior suicde attempts  No access to gun  Pt is in alcohol withdrawal still being monitered every 4 hrs for it,   Pt mssa score are monitered  Tolerating meds and has no side effects.              Medications:     Current Facility-Administered Medications   Medication     acetaminophen (TYLENOL) tablet 650 mg     alum & mag hydroxide-simethicone (MAALOX) suspension 30 mL     diazepam (VALIUM) tablet 5-20 mg     escitalopram (LEXAPRO) tablet 10 mg     folic acid (FOLVITE) tablet 1 mg     hydrOXYzine (ATARAX) tablet 25-50 mg     ibuprofen (ADVIL/MOTRIN) tablet 600 mg     loperamide (IMODIUM) capsule 2 mg     melatonin tablet 3 mg     multivitamin w/minerals (THERA-VIT-M) tablet 1 tablet     nicotine polacrilex (NICORETTE) gum 4 mg     ondansetron (ZOFRAN-ODT) ODT tab 4 mg     prazosin (MINIPRESS) capsule 1 mg     senna-docusate (SENOKOT-S/PERICOLACE) 8.6-50 MG per tablet 1 tablet     thiamine (B-1) tablet 100 mg     traZODone (DESYREL) tablet 50 mg             Allergies:   No Known Allergies         " "Psychiatric Examination:   Blood pressure 115/76, pulse 64, temperature 97.3  F (36.3  C), temperature source Temporal, resp. rate 16, height 1.753 m (5' 9\"), weight 79.5 kg (175 lb 4.8 oz), SpO2 96 %.  Weight is 175 lbs 4.8 oz  Body mass index is 25.89 kg/m .    Appearance:  awake, alert and adequately groomed  Attitude:  cooperative  Eye Contact:  good  Mood:  sad   Affect:  appropriate and in normal range and mood congruent  Speech:  clear, coherent rate /rhythm are good  Psychomotor Behavior:  no evidence of tardive dyskinesia, dystonia, or tics and intact station, gait and muscle tone  Throught Process:  logical  Associations:  no loose associations  Thought Content:  no evidence of suicidal ideation or homicidal ideation, no evidence of psychotic thought, no auditory hallucinations present and no visual hallucinations present  Insight:  limited  Judgement:  limited  Oriented to:  time, person, and place  Attention Span and Concentration:  intact  Recent and Remote Memory:  intact  Language fund of knowledge are adequate         Labs:     Recent Results (from the past 24 hour(s))   GGT    Collection Time: 04/11/22  7:48 AM   Result Value Ref Range     (H) 0 - 75 U/L   Lipid panel    Collection Time: 04/11/22  7:48 AM   Result Value Ref Range    Cholesterol 213 (H) <200 mg/dL    Triglycerides 215 (H) <150 mg/dL    Direct Measure HDL 91 >=40 mg/dL    LDL Cholesterol Calculated 79 <=100 mg/dL    Non HDL Cholesterol 122 <130 mg/dL   TSH with free T4 reflex and/or T3 as indicated    Collection Time: 04/11/22  7:48 AM   Result Value Ref Range    TSH 1.60 0.40 - 4.00 mU/L         DX Major Depressive Disorder, recurrent, severe without psychotic features  Alcohol use disorder, severe  Alcohol withdrawal   Non-suicidal self injury   Borderline Personality Disorder per chart  Generalized Anxiety Disorder  PTSD  Unspecified eating disorder  Elevated BP  Hypocalcemia     PLAN   Alcohol intoxication/withdrawal, " presently is on MSSA protocol with Valium. Continue the same MSSA protocol as ordered. Continue thiamine 100 mg p.o. daily, M.V.I. one p.o. daily and folate 1 mg p.o. Daily  Will continue mssa protocal to detox off alcohol on valium,  Pt is c/o of termor , agitation poor sleep and poor appetite, he has sweats, feels shakey  On mssa client scored scored6 today and  needed needed  0mg po as of yet , total dose since admission was 35mg    MSSA    Eating Disturbances: ate and enjoyed all of it or not applicable  Tremor: 0 - no tremor  Sleep Disturbance: 2  Clouding of Sensorium: no evidence  Hallucinations: 0 - none  Quality of Contact: 0 - awareness of examiner and people around him/her  Agitation: 0 - normal activity  Paroxysmal Sweats: 3  Temperature: 99.5 or below  Pulse: 0 - 69 or below  Total MSSA Score: 6     Will increease lexapro 20 mg po target patient's depression    Patient reports he often feels empty he has self-injurious behaviors burns myself, cut self last time was 16  Struggles with fear of abandonment poor self-esteem  Patient did do dialectical behavior therapy and it worked well for him        He is open to doing it again  Patient has passive suicide ideation but no active suicide ideation plan or intent      Laboratory/Imaging: reviewed with patient   Consults: internal medicine consult reviewed  Patient will be treated in therapeutic milieu with appropriate individual and group therapies as described.  PDMP CHECKED     Supportive psychotheraoy provided, ramon talked about recovery enviroment, relapse prevention, triggers to use.  Discussed with patient many issues of addiction,triggers, relapse, and establishing a solid recovery program.  Asked pt to be med complinat   Medical diagnoses to be addressed this admission:    Plan:    Assessment and Plan/Recommendations:   Cong Tuttle is a 31 year old male with history of etoh abuse, depression, insomnia, and anxiety who was admitted to station 3A  with etoh withdrawal and passive SI. Of note, recently discharged from [facility] 4/7/2022 with similar presentation     Etoh dependency with acute withdrawal, depression and anxiety with passive SI, insomnia: Discharged from [facility] 4/7 and started drinking vodka. Passive SI  - Management per psych      Elevated BP: BP 130s-40s/80s-90s in the setting of etoh withdrawal. Contacted medicine if BP spikes >170/>110 or if consistently >140s/>90s once detox complete     Hypocalcemia: Mild. Ca 8.4. Albumin normal. Trend with PCP on discharge        Legal Status: voluntary    Safety Assessment:   Checks:  15 min  Precautions: withdrawal precautions  Pt has not required locked seclusion or restraints in the past 24 hours to maintain safety, please refer to RN documentation for further details.  Discussed with patient many issues of addiction,triggers, relapse, and establishing a solid recovery program.  Able to give informed consent:  YES   Discussed Risks/Benefits/Side Effects/Alternatives: YES    After discussion of the indications, risks, benefits, alternatives and consequences of no treatment, the patient elects to complete detox treatment

## 2022-04-11 NOTE — PLAN OF CARE
"Goal Outcome Evaluation:      Problem: Substance Withdrawal  Goal: Substance Withdrawal  Description: Signs and symptoms of listed problems will be absent or manageable.  Outcome: Ongoing, Progressing     Problem: Suicidal Behavior  Goal: Suicidal Behavior is Absent or Managed  Outcome: Ongoing, Progressing     Patient is visible in milieu, social with peers. Patient attending groups. Patient affect flat, blunted. Mood is anxious. Patient denies SI, SIB, HI, or hallucinations. Patient continues to be monitored for alcohol withdrawal. MSSA scores 6 and 5. Patient did not require any Valium for withdrawal. Patient VSS, except elevated BP. Patient reports appetite is fair. Patient denies pain. Patient was given prn hydroxyzine 50 mg times 2 for anxiety. Patient reports he is interested in attending [facility]. Patient denies any additional concerns. BP (!) 151/87 (BP Location: Left arm)   Pulse 60   Temp 98  F (36.7  C) (Temporal)   Resp 16   Ht 1.753 m (5' 9\")   Wt 79.5 kg (175 lb 4.8 oz)   SpO2 97%   BMI 25.89 kg/m                 "

## 2022-04-11 NOTE — PROGRESS NOTES
SPIRITUAL HEALTH SERVICES  SPIRITUAL ASSESSMENT Progress Note  [facility] 3AW     REFERRAL SOURCE: Pt request for spiritual care on admission.    Pt asked for  support for confession and communion.    Plan: I have made the referral for   follow up.  Spiritual Health remains available at patient's request for the duration of admission.    Meseret Martinez MDiv  Associate   Pager [phone]  Office [phone]  [facility] remains available 24/7 for emergent requests/referrals, either by having the switchboard page the on-call  or by entering an ASAP/STAT consult in Epic (this will also page the on-call ). Routine Epic consults receive an initial response within 24 hours.

## 2022-04-11 NOTE — PROVIDER NOTIFICATION
04/11/22 0607   Sleep/Rest   Sleep/Rest/Relaxation appears asleep   Sleep Hygiene Promotion awakenings minimized   Night Time # Hours 6 hours     NOC Shift Report    Pt to be on MSSA for ETOH withdrawal and scored 2, 10, and 6 and got valium 10m one time. Pt also got PRN Trazodone 50 mg, Zofran 4 mg. Pt reported relief from nausea. Continue to monitor.

## 2022-04-12 PROCEDURE — 250N000013 HC RX MED GY IP 250 OP 250 PS 637: Performed by: NURSE PRACTITIONER

## 2022-04-12 PROCEDURE — 128N000001 HC R&B CD/MH ADULT

## 2022-04-12 PROCEDURE — 250N000013 HC RX MED GY IP 250 OP 250 PS 637: Performed by: PSYCHIATRY & NEUROLOGY

## 2022-04-12 PROCEDURE — 99231 SBSQ HOSP IP/OBS SF/LOW 25: CPT | Performed by: PSYCHIATRY & NEUROLOGY

## 2022-04-12 PROCEDURE — H2032 ACTIVITY THERAPY, PER 15 MIN: HCPCS

## 2022-04-12 PROCEDURE — 250N000013 HC RX MED GY IP 250 OP 250 PS 637: Performed by: EMERGENCY MEDICINE

## 2022-04-12 RX ORDER — GABAPENTIN 100 MG/1
100 CAPSULE ORAL 3 TIMES DAILY PRN
Qty: 30 CAPSULE | Refills: 1 | Status: ON HOLD | OUTPATIENT
Start: 2022-04-12 | End: 2022-06-14

## 2022-04-12 RX ORDER — MULTIPLE VITAMINS W/ MINERALS TAB 9MG-400MCG
1 TAB ORAL DAILY
Qty: 30 TABLET | Refills: 0 | Status: SHIPPED | OUTPATIENT
Start: 2022-04-13

## 2022-04-12 RX ORDER — PRAZOSIN HYDROCHLORIDE 1 MG/1
1 CAPSULE ORAL AT BEDTIME
Qty: 30 CAPSULE | Refills: 0 | Status: SHIPPED | OUTPATIENT
Start: 2022-04-12

## 2022-04-12 RX ORDER — LANOLIN ALCOHOL/MO/W.PET/CERES
3 CREAM (GRAM) TOPICAL EVERY EVENING
Qty: 30 TABLET | Refills: 0 | Status: SHIPPED | OUTPATIENT
Start: 2022-04-12

## 2022-04-12 RX ORDER — FOLIC ACID 1 MG/1
1 TABLET ORAL DAILY
Qty: 30 TABLET | Refills: 0 | Status: ON HOLD | OUTPATIENT
Start: 2022-04-13 | End: 2022-06-14

## 2022-04-12 RX ORDER — LANOLIN ALCOHOL/MO/W.PET/CERES
100 CREAM (GRAM) TOPICAL DAILY
Qty: 30 TABLET | Refills: 0 | Status: ON HOLD | OUTPATIENT
Start: 2022-04-13 | End: 2022-06-14

## 2022-04-12 RX ORDER — GABAPENTIN 100 MG/1
100 CAPSULE ORAL 3 TIMES DAILY PRN
Status: DISCONTINUED | OUTPATIENT
Start: 2022-04-12 | End: 2022-04-13 | Stop reason: HOSPADM

## 2022-04-12 RX ORDER — TRAZODONE HYDROCHLORIDE 50 MG/1
50 TABLET, FILM COATED ORAL
Qty: 30 TABLET | Refills: 0 | Status: SHIPPED | OUTPATIENT
Start: 2022-04-12

## 2022-04-12 RX ORDER — ESCITALOPRAM OXALATE 20 MG/1
20 TABLET ORAL DAILY
Qty: 30 TABLET | Refills: 0 | Status: ON HOLD | OUTPATIENT
Start: 2022-04-13 | End: 2022-06-14

## 2022-04-12 RX ADMIN — GABAPENTIN 100 MG: 100 CAPSULE ORAL at 14:15

## 2022-04-12 RX ADMIN — FOLIC ACID 1 MG: 1 TABLET ORAL at 08:37

## 2022-04-12 RX ADMIN — TRAZODONE HYDROCHLORIDE 50 MG: 50 TABLET ORAL at 21:44

## 2022-04-12 RX ADMIN — HYDROXYZINE HYDROCHLORIDE 50 MG: 25 TABLET, FILM COATED ORAL at 08:37

## 2022-04-12 RX ADMIN — MULTIPLE VITAMINS W/ MINERALS TAB 1 TABLET: TAB at 08:37

## 2022-04-12 RX ADMIN — HYDROXYZINE HYDROCHLORIDE 50 MG: 25 TABLET, FILM COATED ORAL at 16:30

## 2022-04-12 RX ADMIN — NICOTINE POLACRILEX 4 MG: 4 GUM, CHEWING BUCCAL at 10:37

## 2022-04-12 RX ADMIN — ESCITALOPRAM OXALATE 20 MG: 20 TABLET ORAL at 08:37

## 2022-04-12 RX ADMIN — NICOTINE POLACRILEX 4 MG: 4 GUM, CHEWING BUCCAL at 16:30

## 2022-04-12 RX ADMIN — DOCUSATE SODIUM AND SENNOSIDES 1 TABLET: 8.6; 5 TABLET ORAL at 18:20

## 2022-04-12 RX ADMIN — HYDROXYZINE HYDROCHLORIDE 50 MG: 25 TABLET, FILM COATED ORAL at 21:44

## 2022-04-12 RX ADMIN — DOCUSATE SODIUM AND SENNOSIDES 1 TABLET: 8.6; 5 TABLET ORAL at 09:05

## 2022-04-12 RX ADMIN — MELATONIN TAB 3 MG 3 MG: 3 TAB at 20:21

## 2022-04-12 RX ADMIN — THIAMINE HCL TAB 100 MG 100 MG: 100 TAB at 08:37

## 2022-04-12 RX ADMIN — NICOTINE POLACRILEX 4 MG: 4 GUM, CHEWING BUCCAL at 18:20

## 2022-04-12 RX ADMIN — PRAZOSIN HYDROCHLORIDE 1 MG: 1 CAPSULE ORAL at 21:44

## 2022-04-12 ASSESSMENT — ACTIVITIES OF DAILY LIVING (ADL)
ORAL_HYGIENE: INDEPENDENT
HYGIENE/GROOMING: INDEPENDENT
DRESS: INDEPENDENT
ORAL_HYGIENE: INDEPENDENT
LAUNDRY: WITH SUPERVISION
LAUNDRY: WITH SUPERVISION
HYGIENE/GROOMING: INDEPENDENT
DRESS: STREET CLOTHES

## 2022-04-12 NOTE — PROGRESS NOTES
04/11/22 2100   Group Therapy Session   Time Session Began 1647   Time Session Ended 1735   Total Time patient participated (minutes) 48   Total # Attendees 8   Group Type expressive therapy   Group Topic Covered emotions/expression;disease of addiction/choices in recovery   Group Session Detail Recovery-themed songs   Patient Response/Contribution cooperative with task   Patient Response Detail Receptive and contributed to group process.  Showed solidarity with life experiences with peers, as well as possible internal processing on their own process in recovery and next steps.  Attentive.

## 2022-04-12 NOTE — PROGRESS NOTES
[facility], [place]   Psychiatric Progress Note        Interim history   This is a 31 year old male with Major Depressive Disorder, recurrent, severe without psychotic features  Alcohol use disorder, severe  Alcohol withdrawal   Non-suicidal self injury   Borderline Personality Disorder per chart  Generalized Anxiety Disorder  PTSD  Unspecified eating disorder  Elevated BP  Hypocalcemia.Pt seen in rounds.   The patient's care was discussed with the treatment team during the daily team meeting and/or staff's chart notes were reviewed.  Staff report patient has been visible in the milieu,  no acute eventsovernight.     Patient's mood is better more motivated       pt tried prozac zoloft effexor   Energy Level:fine  Sleep:better  Appetite:ok   motivation interest are improving    no Suicidal/homicidal ideation/plan intent.psychosis  No prior suicde attempts  No access to gun  Pt is in alcohol withdrawal still being monitered every 4 hrs for it,   Pt mssa score are monitered  Tolerating meds and has no side effects.              Medications:     Current Facility-Administered Medications   Medication     acetaminophen (TYLENOL) tablet 650 mg     alum & mag hydroxide-simethicone (MAALOX) suspension 30 mL     diazepam (VALIUM) tablet 5-20 mg     escitalopram (LEXAPRO) tablet 20 mg     folic acid (FOLVITE) tablet 1 mg     hydrOXYzine (ATARAX) tablet 25-50 mg     ibuprofen (ADVIL/MOTRIN) tablet 600 mg     loperamide (IMODIUM) capsule 2 mg     melatonin tablet 3 mg     multivitamin w/minerals (THERA-VIT-M) tablet 1 tablet     nicotine polacrilex (NICORETTE) gum 4 mg     ondansetron (ZOFRAN-ODT) ODT tab 4 mg     prazosin (MINIPRESS) capsule 1 mg     senna-docusate (SENOKOT-S/PERICOLACE) 8.6-50 MG per tablet 1 tablet     thiamine (B-1) tablet 100 mg     traZODone (DESYREL) tablet 50 mg             Allergies:   No Known Allergies         Psychiatric Examination:   Blood pressure 137/88, pulse 79,  "temperature 97.2  F (36.2  C), temperature source Temporal, resp. rate 16, height 1.753 m (5' 9\"), weight 79.5 kg (175 lb 4.8 oz), SpO2 97 %.  Weight is 175 lbs 4.8 oz  Body mass index is 25.89 kg/m .    Appearance:  awake, alert and adequately groomed  Attitude:  cooperative  Eye Contact:  good  Mood:  better  Affect:  appropriate and in normal range and mood congruent  Speech:  clear, coherent rate /rhythm are good  Psychomotor Behavior:  no evidence of tardive dyskinesia, dystonia, or tics and intact station, gait and muscle tone  Throught Process:  logical  Associations:  no loose associations  Thought Content:  no evidence of suicidal ideation or homicidal ideation, no evidence of psychotic thought, no auditory hallucinations present and no visual hallucinations present  Insight:  limited  Judgement:  limited  Oriented to:  time, person, and place  Attention Span and Concentration:  intact  Recent and Remote Memory:  intact  Language fund of knowledge are adequate         Labs:     No results found for this or any previous visit (from the past 24 hour(s)).      DX Major Depressive Disorder, recurrent, severe without psychotic features  Alcohol use disorder, severe  Alcohol withdrawal   Non-suicidal self injury   Borderline Personality Disorder per chart  Generalized Anxiety Disorder  PTSD  Unspecified eating disorder  Elevated BP  Hypocalcemia     PLAN  Pt is out of alcohol detox    Will increease lexapro 20 mg po target patient's depression    Patient reports he often feels empty he has self-injurious behaviors burns myself, cut self last time was 16  Struggles with fear of abandonment poor self-esteem  Patient did do dialectical behavior therapy and it worked well for him        He is open to doing it again  Patient has passive suicide ideation but no active suicide ideation plan or intent      Laboratory/Imaging: reviewed with patient   Consults: internal medicine consult reviewed  Patient will be treated in " therapeutic milieu with appropriate individual and group therapies as described.  PDMP CHECKED     Supportive psychotheraoy provided, ramon talked about recovery enviroment, relapse prevention, triggers to use.  Discussed with patient many issues of addiction,triggers, relapse, and establishing a solid recovery program.  Asked pt to be med complinat   Medical diagnoses to be addressed this admission:    Plan:    Assessment and Plan/Recommendations:   Cong Tuttle is a 31 year old male with history of etoh abuse, depression, insomnia, and anxiety who was admitted to station 3A with etoh withdrawal and passive SI. Of note, recently discharged from [facility] 4/7/2022 with similar presentation     Etoh dependency with acute withdrawal, depression and anxiety with passive SI, insomnia: Discharged from [facility] 4/7 and started drinking vodka. Passive SI  - Management per psych      Elevated BP: BP 130s-40s/80s-90s in the setting of etoh withdrawal. Contacted medicine if BP spikes >170/>110 or if consistently >140s/>90s once detox complete     Hypocalcemia: Mild. Ca 8.4. Albumin normal. Trend with PCP on discharge        Legal Status: voluntary    Safety Assessment:   Checks:  15 min  Precautions: withdrawal precautions  Pt has not required locked seclusion or restraints in the past 24 hours to maintain safety, please refer to RN documentation for further details.  Discussed with patient many issues of addiction,triggers, relapse, and establishing a solid recovery program.  Able to give informed consent:  YES   Discussed Risks/Benefits/Side Effects/Alternatives: YES    After discussion of the indications, risks, benefits, alternatives and consequences of no treatment, the patient elects to complete detox treatment

## 2022-04-12 NOTE — PLAN OF CARE
"  Problem: Substance Withdrawal  Goal: Substance Withdrawal  Description: Signs and symptoms of listed problems will be absent or manageable.  Outcome: Ongoing, Progressing     Problem: Substance Withdrawal  Goal: Social and Therapeutic (Substance Withdrawal)  Description: Signs and symptoms of listed problems will be absent or manageable.  Outcome: Ongoing, Progressing     Problem: Suicidal Behavior  Goal: Suicidal Behavior is Absent or Managed  Outcome: Ongoing, Progressing   Goal Outcome Evaluation:    Plan of Care Reviewed With: patient      Patient visible in milieu, social with peers. Patient affect flat, blunted. Mood is reported as anxious. Patient denies SI, SIB, HI, or hallucinations. Patient continues to be monitored for alcohol withdrawal. MSSA scores 1 and 5. Patient did not require Valium for withdrawal this shift. Patient VSS, denies pain, appetite good. Patient was given prn hydroxyzine 50 mg times 2 for anxiety. Was given prn trazodone 50 mg times one for sleep. Patient is hopeful to attend [facility] for CD treatment. Patient denies any additional concerns. /81   Pulse 98   Temp 97.9  F (36.6  C) (Temporal)   Resp 16   Ht 1.753 m (5' 9\")   Wt 79.5 kg (175 lb 4.8 oz)   SpO2 97%   BMI 25.89 kg/m              "
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
## 2022-04-12 NOTE — PLAN OF CARE
Goal Outcome Evaluation:        Patient slept 7 hours, uninterrupted, breathing quietly during the shift.    No complaints or concerns.    Precautions, si sib  Code 1  Status 15  Voluntary.

## 2022-04-12 NOTE — PROGRESS NOTES
04/12/22 1800   Group Therapy Session   Time Session Began 1645   Time Session Ended 1743   Total Time patient participated (minutes) 58   Total # Attendees 4   Group Type expressive therapy   Group Topic Covered disease of addiction/choices in recovery   Group Session Detail Song Based Recovery Discussion   Patient Response/Contribution cooperative with task   Patient Response Detail Cong was a strong participant in the group discussion on recovery and addiction, as was inspired by a song that he explained causes him to think about how no amount of liquor is enough for him when drinking, and commentary on that.      Themes that emerged throughout discussion were; codependency as a danger to chemical sobriety, individuating from parents, and the shame that accompanies a relapse.      Affect was open and engaged.

## 2022-04-12 NOTE — PLAN OF CARE
Goal Outcome Evaluation:    Patient has not required any valium for alcohol detox since 04/11 0212. All MSSA scores since that time have been less than 8. Pt is now removed from alcohol detox monitoring status.

## 2022-04-12 NOTE — PROGRESS NOTES
"Pt given information on lamotrigine and handed it back \"If it's for bipolar, honestly I don't want to read it\". Discussed with the patient the importance of being treated for mental illness and how being on the proper medication can make a drastic difference in a persons quality of life. Pt states  \"like 99% of the time\" he's only suicidal while drinking. Pt then did take the handout and states he will look it over.   "

## 2022-04-12 NOTE — PLAN OF CARE
Goal Outcome Evaluation:    Plan of Care Reviewed With: patient     Overall Patient Progress: improving    Outcome Evaluation: pt is now out of detox monitoring status.    Rates anxiety 6 and depression 2 of 10 in severity. Prn hydroxyzine administered x 1. Pt denies suicidal ideation plans or intent. States willingness to alert staff if he develops any plan or thought of SI/SIB (contracts for safety).     Will continue to monitor and intervene as warranted. Expect pt to discharge tomorrow directly to [facility] for treatment.

## 2022-04-13 ENCOUNTER — HOSPITAL ENCOUNTER (OUTPATIENT)
Dept: BEHAVIORAL HEALTH | Facility: CLINIC | Age: 32
Discharge: HOME | End: 2022-04-13
Attending: FAMILY MEDICINE
Payer: COMMERCIAL

## 2022-04-13 ENCOUNTER — TELEPHONE (OUTPATIENT)
Dept: BEHAVIORAL HEALTH | Facility: CLINIC | Age: 32
End: 2022-04-13

## 2022-04-13 VITALS
TEMPERATURE: 97.6 F | BODY MASS INDEX: 25.96 KG/M2 | OXYGEN SATURATION: 98 % | WEIGHT: 175.3 LBS | DIASTOLIC BLOOD PRESSURE: 84 MMHG | SYSTOLIC BLOOD PRESSURE: 134 MMHG | HEART RATE: 70 BPM | RESPIRATION RATE: 16 BRPM | HEIGHT: 69 IN

## 2022-04-13 VITALS — TEMPERATURE: 98 F | OXYGEN SATURATION: 96 %

## 2022-04-13 DIAGNOSIS — F19.20 CHEMICAL DEPENDENCY (H): Primary | ICD-10-CM

## 2022-04-13 PROCEDURE — 250N000013 HC RX MED GY IP 250 OP 250 PS 637: Performed by: PSYCHIATRY & NEUROLOGY

## 2022-04-13 PROCEDURE — 250N000013 HC RX MED GY IP 250 OP 250 PS 637: Performed by: NURSE PRACTITIONER

## 2022-04-13 PROCEDURE — 250N000013 HC RX MED GY IP 250 OP 250 PS 637: Performed by: EMERGENCY MEDICINE

## 2022-04-13 PROCEDURE — 99239 HOSP IP/OBS DSCHRG MGMT >30: CPT | Performed by: PSYCHIATRY & NEUROLOGY

## 2022-04-13 PROCEDURE — 1002N00001 HC LODGING PLUS FACILITY CHARGE ADULT

## 2022-04-13 RX ORDER — MAGNESIUM HYDROXIDE/ALUMINUM HYDROXICE/SIMETHICONE 120; 1200; 1200 MG/30ML; MG/30ML; MG/30ML
30 SUSPENSION ORAL EVERY 6 HOURS PRN
Status: ON HOLD | COMMUNITY
End: 2022-06-14

## 2022-04-13 RX ORDER — LORATADINE 10 MG/1
10 TABLET ORAL PRN
COMMUNITY

## 2022-04-13 RX ORDER — IBUPROFEN 200 MG
400 TABLET ORAL EVERY 6 HOURS PRN
Status: ON HOLD | COMMUNITY
End: 2022-06-14

## 2022-04-13 RX ORDER — AMOXICILLIN 250 MG
2 CAPSULE ORAL DAILY PRN
Status: ON HOLD | COMMUNITY
End: 2022-06-14

## 2022-04-13 RX ORDER — ACETAMINOPHEN 325 MG/1
325-650 TABLET ORAL EVERY 4 HOURS PRN
Status: ON HOLD | COMMUNITY
End: 2022-06-14

## 2022-04-13 RX ADMIN — FOLIC ACID 1 MG: 1 TABLET ORAL at 08:35

## 2022-04-13 RX ADMIN — HYDROXYZINE HYDROCHLORIDE 50 MG: 25 TABLET, FILM COATED ORAL at 08:35

## 2022-04-13 RX ADMIN — GABAPENTIN 100 MG: 100 CAPSULE ORAL at 11:28

## 2022-04-13 RX ADMIN — MULTIPLE VITAMINS W/ MINERALS TAB 1 TABLET: TAB at 08:35

## 2022-04-13 RX ADMIN — ESCITALOPRAM OXALATE 20 MG: 20 TABLET ORAL at 08:35

## 2022-04-13 RX ADMIN — NICOTINE POLACRILEX 4 MG: 4 GUM, CHEWING BUCCAL at 11:28

## 2022-04-13 RX ADMIN — THIAMINE HCL TAB 100 MG 100 MG: 100 TAB at 08:36

## 2022-04-13 RX ADMIN — DOCUSATE SODIUM AND SENNOSIDES 1 TABLET: 8.6; 5 TABLET ORAL at 09:08

## 2022-04-13 RX ADMIN — NICOTINE POLACRILEX 4 MG: 4 GUM, CHEWING BUCCAL at 08:35

## 2022-04-13 ASSESSMENT — COLUMBIA-SUICIDE SEVERITY RATING SCALE - C-SSRS
1. IN THE PAST MONTH, HAVE YOU WISHED YOU WERE DEAD OR WISHED YOU COULD GO TO SLEEP AND NOT WAKE UP?: YES
TOTAL  NUMBER OF ABORTED OR SELF INTERRUPTED ATTEMPTS PAST 3 MONTHS: NO
MOST RECENT DATE: 65686
1. IN THE PAST MONTH, HAVE YOU WISHED YOU WERE DEAD OR WISHED YOU COULD GO TO SLEEP AND NOT WAKE UP?: SEE ABOVE
MOST LETHAL DATE: 66051
TOTAL  NUMBER OF ABORTED OR SELF INTERRUPTED ATTEMPTS LIFETIME: YES
TOTAL  NUMBER OF INTERRUPTED ATTEMPTS LIFETIME: 1
ATTEMPT PAST THREE MONTHS: NO
LETHALITY/MEDICAL DAMAGE CODE MOST LETHAL ACTUAL ATTEMPT: MODERATE PHYSICAL DAMAGE, MEDICAL ATTENTION NEEDED
FIRST ATTEMPT DATE: 66051
TOTAL  NUMBER OF ACTUAL ATTEMPTS LIFETIME: 1
LETHALITY/MEDICAL DAMAGE CODE FIRST POTENTIAL ATTEMPT: BEHAVIOR LIKELY TO RESULT IN INJURY BUT NOT LIKELY TO CAUSE DEATH
ATTEMPT LIFETIME: YES
LETHALITY/MEDICAL DAMAGE CODE MOST RECENT ACTUAL ATTEMPT: MODERATE PHYSICAL DAMAGE, MEDICAL ATTENTION NEEDED
LETHALITY/MEDICAL DAMAGE CODE MOST RECENT POTENTIAL ATTEMPT: BEHAVIOR LIKELY TO RESULT IN INJURY BUT NOT LIKELY TO CAUSE DEATH
TOTAL  NUMBER OF INTERRUPTED ATTEMPTS PAST 3 MONTHS: NO
1. HAVE YOU WISHED YOU WERE DEAD OR WISHED YOU COULD GO TO SLEEP AND NOT WAKE UP?: YES
TOTAL  NUMBER OF ABORTED OR SELF INTERRUPTED ATTEMPTS LIFETIME: 1
REASONS FOR IDEATION PAST MONTH: COMPLETELY TO END OR STOP THE PAIN (YOU COULDN'T GO ON LIVING WITH THE PAIN OR HOW YOU WERE FEELING)
TOTAL  NUMBER OF INTERRUPTED ATTEMPTS LIFETIME: YES
REASONS FOR IDEATION LIFETIME: COMPLETELY TO END OR STOP THE PAIN (YOU COULDN'T GO ON LIVING WITH THE PAIN OR HOW YOU WERE FEELING)
LETHALITY/MEDICAL DAMAGE CODE FIRST ACTUAL ATTEMPT: MODERATE PHYSICAL DAMAGE, MEDICAL ATTENTION NEEDED
2. HAVE YOU ACTUALLY HAD ANY THOUGHTS OF KILLING YOURSELF?: NO
6. HAVE YOU EVER DONE ANYTHING, STARTED TO DO ANYTHING, OR PREPARED TO DO ANYTHING TO END YOUR LIFE?: NO
LETHALITY/MEDICAL DAMAGE CODE MOST LETHAL POTENTIAL ATTEMPT: BEHAVIOR LIKELY TO RESULT IN INJURY BUT NOT LIKELY TO CAUSE DEATH

## 2022-04-13 ASSESSMENT — PATIENT HEALTH QUESTIONNAIRE - PHQ9: SUM OF ALL RESPONSES TO PHQ QUESTIONS 1-9: 3

## 2022-04-13 ASSESSMENT — ANXIETY QUESTIONNAIRES
3. WORRYING TOO MUCH ABOUT DIFFERENT THINGS: NOT AT ALL
GAD7 TOTAL SCORE: 0
7. FEELING AFRAID AS IF SOMETHING AWFUL MIGHT HAPPEN: NOT AT ALL
2. NOT BEING ABLE TO STOP OR CONTROL WORRYING: NOT AT ALL
1. FEELING NERVOUS, ANXIOUS, OR ON EDGE: NOT AT ALL
IF YOU CHECKED OFF ANY PROBLEMS ON THIS QUESTIONNAIRE, HOW DIFFICULT HAVE THESE PROBLEMS MADE IT FOR YOU TO DO YOUR WORK, TAKE CARE OF THINGS AT HOME, OR GET ALONG WITH OTHER PEOPLE: NOT DIFFICULT AT ALL
6. BECOMING EASILY ANNOYED OR IRRITABLE: NOT AT ALL
4. TROUBLE RELAXING: NOT AT ALL
5. BEING SO RESTLESS THAT IT IS HARD TO SIT STILL: NOT AT ALL

## 2022-04-13 ASSESSMENT — ACTIVITIES OF DAILY LIVING (ADL)
ORAL_HYGIENE: INDEPENDENT
HYGIENE/GROOMING: INDEPENDENT
LAUNDRY: WITH SUPERVISION
DRESS: STREET CLOTHES

## 2022-04-13 NOTE — PROGRESS NOTES
Initial Services Plan        Before your first treatment group, please do the following    Immediate health & safety concerns: Go to the emergency room if you start to have withdrawal symptoms.  Look for sober housing and a supportive social network.  Obtain personal items (glasses, hearing aides, medicines, diabetes supplies, clothing, etc.).  Look for a support network (such as AA, NA, DBT group, a [facility] group, etc.)    Suggestions for client during the time between intake & completion of treatment plan:  Tour your treatment center (unit or outpatient clinic).  Introduce yourself to the treatment group.  Spend time getting to know your peers.  Complete your psycho-social paperwork.  Complete the problem list for your treatment plan.  Review your patient or client handbook.  Identify concerns about whom to ask for family week    Client issues to be addressed in the first treatment sessions:  Arrange  and transportation as needed.  Identify motivations(s) for coming to treatment, i.e. legal, family, job, self  Identify concerns about coming into treatment, i.e. fear of failing again, sharing a room in treatment  Identify concerns about going to group, i.e. fear of talking in group      VALENCIA Aleman  4/13/2022  1:42 PM

## 2022-04-13 NOTE — DISCHARGE SUMMARY
Cong Tuttle MRN# [id number]   Age: 31 year old YOB: 1990     Date of Admission:  4/9/2022  Date of Discharge:  4/13/2022  Admitting Physician:  Herlinda Teague MD  Discharge Physician:  Herlinda Teague MD      DISCHARGE  DX  Major Depressive Disorder, recurrent, severe without psychotic features  Alcohol use disorder, severe    Borderline Personality Disorder per chart  Generalized Anxiety Disorder  PTSD           Event Leading to Hospitalization:     See Admission note by admitting provider for patient encounter. for additional details.          Hospital Course:   PATIENT was admitted to [facility] attending  under Cathy Childress MD   please review the detailed admit note on 4/10/22   The patient was placed under status 15 (15 minute checks) to ensure patient safety.   MSSA protocol was initiated due to the patient's history of alcohol abuse and concern for withdrawal symptoms.  CBC, BMP and utox obtained.    All outpatient medications were continued  LEXAPRO INCREASED TO 20 MG   PATIENTdid participate in groups and was visible in the milieu.   PT WANTS TO LIVE FOR FAMILY /FRIENDS   The patient's symptoms of ALCOHOL WITHDRAWAL  improved.     Patients energy motivation , sleep appetite improved.  Pt completed detox . It was un eventful.      Discussed with patient medications for craving.  Spoke with patient about triggers coping skills relapse prevention.    CONSULTS DONE DURING PATIENTS HOSPITALIZATION.  Patient was seen by medicine on date4/10/22    This as per their medical consult   Assessment and Plan/Recommendations:   Cong Tuttle is a 31 year old male with history of etoh abuse, depression, insomnia, and anxiety who was admitted to station 3A with etoh withdrawal and passive SI. Of note, recently discharged from [facility] 4/7/2022 with similar presentation     Etoh dependency with acute withdrawal, depression and anxiety with passive SI, insomnia:  "Discharged from [facility] 4/7 and started drinking vodka. Passive SI  - Management per psych      Elevated BP: BP 130s-40s/80s-90s in the setting of etoh withdrawal. Contacted medicine if BP spikes >170/>110 or if consistently >140s/>90s once detox complete     Hypocalcemia: Mild. Ca 8.4. Albumin normal. Trend with PCP on discharge         Currently, medically stable and internal medicine will sign off. Please contact if future questions or concerns arise. Thank you for the opportunity to be a part of this patient's care.            Pt was seen by cm  As per recommendations from cm  Checked in with pt to discuss discharge planning. Pt reports he is hoping to attend [facility]. Pt completed CD assessment paperwork. Explained to pt next steps in referral process. Plan to complete CD assessment today.      Addendum: Unable to complete CD assessment today due to time constraints. Per LP, bed available for pt Wednesday. Discussed with pt his MH history, screened for barriers for LP. Pt reports he does not believe his MH will be a concern. Pt reports when he is sober he manages his MH symptoms well, but when he drinks they worsen. Pt reports as long as he is sober he should be able to manage MH symptoms. Pt reports he has not had issues with self-harm mostly since October 2019. Pt reports in Nov 2021 he briefly engaged in some self-harm but not \"seriously\" as he had in the past. Pt reports no current SI. Pt reports a history of eating disorder, but reports no active symptoms for approx 6 months. Pt reports a month ago he did restrict a little bit but hasn't really the past month. Pt reports he is worried because he gained weight due to his ETOH use the past month, but wants to work with a dietician on this. Pt reports 6 months ago he used a laxative to purge, but has not done that in the past 6 months. Pt reports he has not purged at all while on 3A and has been eating all of his meals. Pt reports he has done " [facility] in the past and wants to work with their dietician after LP on an outpatient basis for continuing recovery, but does not feel he needs their full program.           Labs:reviewed with patient     No results found for this or any previous visit (from the past 48 hour(s)).      Recent Results (from the past 240 hour(s))   Alcohol breath test POCT    Collection Time: 04/09/22 11:37 PM   Result Value Ref Range    Alcohol Breath Test 0.211 (A) 0.00 - 0.01   Drug abuse screen 1 urine (ED)    Collection Time: 04/10/22 12:43 AM   Result Value Ref Range    Amphetamines Urine Screen Negative Screen Negative    Barbiturates Urine Screen Negative Screen Negative    Benzodiazepines Urine Screen Negative Screen Negative    Cannabinoids Urine Screen Negative Screen Negative    Cocaine Urine Screen Negative Screen Negative    Opiates Urine Screen Negative Screen Negative   Asymptomatic COVID-19 Virus (Coronavirus) by PCR Nasopharyngeal    Collection Time: 04/10/22  1:53 AM    Specimen: Nasopharyngeal; Swab   Result Value Ref Range    SARS CoV2 PCR Negative Negative   Comprehensive metabolic panel    Collection Time: 04/10/22  1:53 AM   Result Value Ref Range    Sodium 140 133 - 144 mmol/L    Potassium 4.2 3.4 - 5.3 mmol/L    Chloride 107 94 - 109 mmol/L    Carbon Dioxide (CO2) 24 20 - 32 mmol/L    Anion Gap 9 3 - 14 mmol/L    Urea Nitrogen 27 7 - 30 mg/dL    Creatinine 0.70 0.66 - 1.25 mg/dL    Calcium 8.4 (L) 8.5 - 10.1 mg/dL    Glucose 105 (H) 70 - 99 mg/dL    Alkaline Phosphatase 78 40 - 150 U/L    AST 33 0 - 45 U/L    ALT 41 0 - 70 U/L    Protein Total 7.5 6.8 - 8.8 g/dL    Albumin 3.5 3.4 - 5.0 g/dL    Bilirubin Total 0.6 0.2 - 1.3 mg/dL    GFR Estimate >90 >60 mL/min/1.73m2   CBC with platelets and differential    Collection Time: 04/10/22  1:53 AM   Result Value Ref Range    WBC Count 6.5 4.0 - 11.0 10e3/uL    RBC Count 4.38 (L) 4.40 - 5.90 10e6/uL    Hemoglobin 14.6 13.3 - 17.7 g/dL    Hematocrit 41.9 40.0 -  53.0 %    MCV 96 78 - 100 fL    MCH 33.3 (H) 26.5 - 33.0 pg    MCHC 34.8 31.5 - 36.5 g/dL    RDW 12.4 10.0 - 15.0 %    Platelet Count 307 150 - 450 10e3/uL    % Neutrophils 45 %    % Lymphocytes 41 %    % Monocytes 9 %    % Eosinophils 4 %    % Basophils 1 %    % Immature Granulocytes 0 %    NRBCs per 100 WBC 0 <1 /100    Absolute Neutrophils 2.9 1.6 - 8.3 10e3/uL    Absolute Lymphocytes 2.7 0.8 - 5.3 10e3/uL    Absolute Monocytes 0.6 0.0 - 1.3 10e3/uL    Absolute Eosinophils 0.2 0.0 - 0.7 10e3/uL    Absolute Basophils 0.1 0.0 - 0.2 10e3/uL    Absolute Immature Granulocytes 0.0 <=0.4 10e3/uL    Absolute NRBCs 0.0 10e3/uL   GGT    Collection Time: 04/11/22  7:48 AM   Result Value Ref Range     (H) 0 - 75 U/L   Lipid panel    Collection Time: 04/11/22  7:48 AM   Result Value Ref Range    Cholesterol 213 (H) <200 mg/dL    Triglycerides 215 (H) <150 mg/dL    Direct Measure HDL 91 >=40 mg/dL    LDL Cholesterol Calculated 79 <=100 mg/dL    Non HDL Cholesterol 122 <130 mg/dL   TSH with free T4 reflex and/or T3 as indicated    Collection Time: 04/11/22  7:48 AM   Result Value Ref Range    TSH 1.60 0.40 - 4.00 mU/L   Extra Purple Top Tube    Collection Time: 04/11/22  7:48 AM   Result Value Ref Range    Hold Specimen JI             Because this patient meets criteria for an Alcohol Use Disorder, I performed the following brief intervention on the date of this note:              1) Expressed concern that the patient is drinking at unhealthy levels known to increase their risk of alcohol related problems              2) Gave feedback linking alcohol use and health, including personalized feedback explaining how alcohol use can interact with their medical and/or psychiatric problems, and with prescribed medications.              3) Advised patient to abstain.    PT counseled on nicotine cessation and nicotine replacement provided    Discussed with patient many issues of addiction,triggers, relapse, and establishing a  solid recovery program.    DISCHARGE MENTAL STATUS EXAMINATION:  The patient is alert, oriented x3.  Good fund of knowledge.  Good use of language.  Recent and remote memory, language, fund of knowledge are all adequate.  Euthymic mood congruent affect  Speech normal rate/rhythm linear tp no loose asso,The patient does not have any active suicidal or homicidal ideation.  Does not have any auditory or visual hallucination.  Fair insight/judgment At this time, the patient was stable to be discharged.        Pt was not determined to not be a danger to himself or others. At the current time of discharge, the patient does not meet criteria for involuntary hospitalization. On the day of discharge, the patient reports that they do not have suicidal or homicidal ideation and would never hurt themselves or others. Steps taken to minimize risk include: assessing patient s behavior and thought process daily during hospital stay, discharging patient with adequate plan for follow up for mental and physical health and discussing safety plan of returning to the hospital should the patient ever have thoughts of harming themselves or others. Therefore, based on all available evidence including the factors cited above, the patient does not appear to be at imminent risk for self-harm, and is appropriate for outpatient level of care.     Educated about side effects/risk vs benefits /alternative including non treatment.Pt consented to be on medication.     .Total time spent on discharge summary more than 35 min  More than  20 min  planning, coordination of care, medication reconciliation and performance of physical exam on day of discharge.Care was coordinated with unit RN and unit therapist         Review of your medicines      START taking      Dose / Directions   folic acid 1 MG tablet  Commonly known as: FOLVITE  Used for: Borderline personality disorder (H)      Dose: 1 mg  Take 1 tablet (1 mg) by mouth in the morning.  Quantity:  30 tablet  Refills: 0     gabapentin 100 MG capsule  Commonly known as: NEURONTIN  Used for: Borderline personality disorder (H)      Dose: 100 mg  Take 1 capsule (100 mg) by mouth 3 times daily as needed (anxiety)  Quantity: 30 capsule  Refills: 1     melatonin 3 MG tablet  Used for: Borderline personality disorder (H)      Dose: 3 mg  Take 1 tablet (3 mg) by mouth every evening  Quantity: 30 tablet  Refills: 0     multivitamin w/minerals tablet  Used for: Borderline personality disorder (H)      Dose: 1 tablet  Take 1 tablet by mouth in the morning.  Quantity: 30 tablet  Refills: 0     nicotine polacrilex 4 MG gum  Commonly known as: NICORETTE  Used for: Borderline personality disorder (H)      Dose: 4 mg  Place 1 each (4 mg) inside cheek every hour as needed for other (nicotine withdrawal symptoms)  Quantity: 30 each  Refills: 0     prazosin 1 MG capsule  Commonly known as: MINIPRESS  Used for: Borderline personality disorder (H)      Dose: 1 mg  Take 1 capsule (1 mg) by mouth At Bedtime  Quantity: 30 capsule  Refills: 0     thiamine 100 MG tablet  Commonly known as: B-1  Used for: Borderline personality disorder (H)      Dose: 100 mg  Take 1 tablet (100 mg) by mouth in the morning.  Quantity: 30 tablet  Refills: 0        CONTINUE these medicines which may have CHANGED, or have new prescriptions. If we are uncertain of the size of tablets/capsules you have at home, strength may be listed as something that might have changed.      Dose / Directions   escitalopram 20 MG tablet  Commonly known as: LEXAPRO  This may have changed:     medication strength    how much to take  Used for: Borderline personality disorder (H)      Dose: 20 mg  Take 1 tablet (20 mg) by mouth in the morning.  Quantity: 30 tablet  Refills: 0     traZODone 50 MG tablet  Commonly known as: DESYREL  This may have changed:     how much to take    reasons to take this  Used for: Borderline personality disorder (H)      Dose: 50 mg  Take 1 tablet  "(50 mg) by mouth nightly as needed for sleep (may repeat after 60 minutes)  Quantity: 30 tablet  Refills: 0        CONTINUE these medicines which have NOT CHANGED      Dose / Directions   fish oil-omega-3 fatty acids 1000 MG capsule      Dose: 1 g  [FISH OIL-OMEGA-3 FATTY ACIDS 300-1,000 MG CAPSULE] Take 1 g by mouth daily.  Refills: 0        STOP taking    CLOVGRAN Tabs        diphenhydrAMINE 25 MG tablet  Commonly known as: BENADRYL        hydrOXYzine 50 MG capsule  Commonly known as: VISTARIL        Lactobacillus Rhamnosus (GG) capsule  Generic drug: lactobacillus rhamnosus (GG)        VITAMIN D3 PO              Where to get your medicines      These medications were sent to [facility] [place], [place] - [address]  [address] [address]    Phone: [phone]     escitalopram 20 MG tablet    folic acid 1 MG tablet    gabapentin 100 MG capsule    melatonin 3 MG tablet    multivitamin w/minerals tablet    nicotine polacrilex 4 MG gum    prazosin 1 MG capsule    thiamine 100 MG tablet    traZODone 50 MG tablet          Disposition: LP     Facts about COVID19 at www.cdc.gov/COVID19 and www.[place].gov/covid19     Keeping hands clean is one of the most important steps we can take to avoid getting sick and spreading germs to others.  Please wash your hands frequently and lather with soap for at least 20 seconds!     Medical Follow-Up:DR COE 5/3/22       Treatment Follow-Up:LP  .        \"Much or all of the text in this note was generated through the use of Dragon Dictate voice to text software. Errors in spelling or words which appear to be out of contact are unintentional, may be present due having escaped editing\"     "

## 2022-04-13 NOTE — PLAN OF CARE
Goal Outcome Evaluation:    Plan of Care Reviewed With: patient      Pt was visible in the milieu. Pt is eating and drinking appropriately. Endorses anxiety 5 and depression 3 out of 10. Complains of some discomfort on tailbone s/p pilonidal cyst removal in January. Spot noted appears not to be having any drainage. Clean dry and intact, informed to speak to provider about it tomorrow. Pt complained of constipation given PRN Senna. Will continue to monitor.

## 2022-04-13 NOTE — PLAN OF CARE
Goal Outcome Evaluation:    Patient appeared to be sleeping throughout the night. No concerns voiced.       Pt verbally understands below information.
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
## 2022-04-13 NOTE — TELEPHONE ENCOUNTER
Per Dr. Teague pt needs referral for psychiatry for medication management. Pt also requesting this. Pt was newly started on Lexapro while admitted to [facility]. Please address. Thanks. Coire Mead RN

## 2022-04-13 NOTE — PLAN OF CARE
Goal Outcome Evaluation:    Plan of Care Reviewed With: patient     Pt given copy of their discharge instructions and medication administration instructions. All discharge plans and labs were discussed with patient. Pt reports no questions at this time regarding discharge plans, labs or medications. Pt denies any suicidal ideation, plans or intent at this time. Patient discharge assisted via [facility] directly to the lobby. Patient plan is to begin treatment today at [facility]. Patient is discharged at this time.

## 2022-04-13 NOTE — PROGRESS NOTES
[facility] Assessment      Vital signs:     There were no vitals taken for this visit.      Transfer from 3A    Counselor: Mariaa  Drug of Choice: ETOH, benzos in the past.   Last use: ETOH: 4/9/22 Benzos: 4/2021  Home clinic/MD:   STELLA RUDOLPH [facility]  [address], [place] [address]  [phone]  MALENA signed and faxed    Psychiatrist/therapist: None currently, LP RN to assist pt with setting up f/u psychiatry per Dr. Teague's request in 7-9 days.    Medical history/current conditions: Pilonidal cyst removed in 1/17/22, currently pt reports soreness in this area and that a skin tag developed in the same area. Pt plans to f/u with PCP to manage this. Pt also reports hx of constipation and currently experiencing this also. Pt reports last BM 4 days ago.     H&P Screen:  H&P within the last 90 days: Yes.  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 Pt has completed the [facility] in 2019 and [place] 8/21. Per Epic, hx of depression, anxiety, and BPD; pt denies.  Medication compliant?: Yes  Recent sucidal thoughts? Hx of SI, last time in 2019. Pt also has a hx of one SA in 2019; pt slit wrists.   Current thought of self-harm? None currently, last time was in November 2021; pt reports he was also engaging in cutting and Cigarette burning at that time    Plan? Denies any plan or intent to harm himself or others. Pt able to contract for safety in regards to SI, SA, or any self harm behaviors. Encouraged pt to notify staff immediately if any of these sxs develop.    Pain assessment:   Pt. Experiencing pain at this time?  No    LP [facility] Screen for COVID-19    Are you interested in receiving the COVID vaccine while you are at [facility]?  No, already fully vaccinated.      IF YES, have the pt's do the following:     Pt call retail pharmacy at [phone] ASAP to make  an appt.       Pt will report to LPRN date and time of appt and LPRN to place on [facility] SBAR.     Pt will go to appt and fill out paperwork there.      Do you have any of the following NEW or worsening symptoms NOT attributed to pre-existing conditions?    No    o Fever of 100.0  F (37.8 C) or over  o Chills  o Cough  o Shortness of Breath  o Loss of taste or smell  o Generalized body aches  o Persistent headache  o Sore throat (or trouble eating or drinking in young children?)  o Nausea, Vomiting, or diarrhea (loose stools)    Did you test positive for COVID-19 in the last 14  days or are you waiting on the test results due to an exposure or symptoms?  No    Has anyone told you to self-quarantine due to exposure to someone with COVID-19?  No    You will be required to stay in your room until COVID lab results confirm negative. If COVID results are positive, You will have to exit the LP program, quarantine as recommended per CDC and then may return for CD treatment after symptoms have resolved.  What is your exit plan should you be positive for COVID? N/A    Does the above COVID-19 screen need to be reviewed by Infection Prevention? No    COVID-19 Test completed by LPRN ? No    IF PATIENTS ARE FULLY VACCINATED, OMIT QUESTIONS BELOW    In the last 2 weeks have you been in an large group gatherings (more than 10 people)? FULLY VACCINATED, OMIT QUESTION    Have you been covering your nose and mouth while out in the community? FULLY VACCINATED, OMIT QUESTION    COVID-19 - Pt informed of the following while at LP:    1)Staff will take temperature and O2Sats once daily    2) Practice good hand washing hygiene and avoid touching face    3) If pt has any of the symptoms below, notify staff immediately.      Fever     Cough     Shortness of breath or difficulty breathing     Chills     Repeated shaking with chills    Muscle pain     4) COVID-19 testing may be initiated more than once during your stay.  If COVID results  are at anytime positive, the pt will follow exit plan as listed above,  quarantine as recommended per CDC and then may return for CD treatment after symptoms have resolved.     5) Per COVID protocol, during your stay at , social distancing is required AND mouth and nose must be covered at all times with facial mask while out in milieu.      6) Patients will not be allowed to go to any outside appointments, all outside appointments will need to be virtual or by phone    RN Assessment of Patient's Ability to Safely Manage and Self-Administer Respiratory Treatments    Has experience in the management of Respiratory (If NA, indicate and move to Integrative Therapies): None prescribed     Integrative Therapies: Essential Oils    Patient requesting essential oil inhaler to manage (Mood/Mental Health/Physical/Spiritual symptoms).     Discussed appropriate use of essential oil inhalers and instructed patient not to leave labeled product out on unit.     Patient was screened for kidney disease, asthma/reactive airway disease and rashes and wounds or 1st trimester of pregnancy    List Essential Oils requested by pt: refused    Patient verbalized and demonstrated understanding of how to use essential oil inhaler correctly and will notify LP RN with any concerns or side effects. Patient agrees not to share their essential oil inhaler with other clients.  Continue to support the patient in safely utilizing integrative therapies as able to manage symptoms during treatment.     Patient tobacco use:    Do you use tobacco? Yes  Type? vape and chew   How often? daily  How much? 0.5 ppd   Are you interested in quitting? Yes    NRT (Nicotine Replacement therapy) ordered? Ordered from 3A   Pt is aware of the dangers of tobacco cessation and in contemplation.    Pt given written education.    Nutritional Assessment:    Have you ever purged, binged or restricted yourself as a way to control your weight?   Yes, explain: pt reports he is  diagnosed with atypical anorexia. Pt has completed the [facility] in 2019 and [place] 8/21. Pt reports this is in remission.      Are you on a special diet?   No     Do you have any concerns regarding your nutritional status?   No     Have you had any appetite changes in the last 3 months?   No   Have you had weight loss or weight gain of more than 10 lbs in the last 3 months?   If patient gained or lost more than 10 lbs, then refer to program RN / attending Physician for assessment.   Yes, explain: weight gain r/t to [facility].   Was the patient informed of BMI?    Normal, No Intervention   No, refused   Have you engaged in any risk-taking behavior that would put you at risk for exposure to blood-borne or sexually transmitted diseases?   No   Do you have any dental problems?   No       Nursing Assessment Summary: Pt appears medically stable. Pt is requiring psychiatric f/u for medication management. LP RN will assist with this. Pt also reports constipation and has magnesium citrate which he requests to take as it has helped in the past. LP RN will request approval for pt to self-administer this one time from medical director.    On-going nursing intervention required?   No    Acute care visit recommended: no.    Corie Mead, RN

## 2022-04-13 NOTE — PROGRESS NOTES
Progress Note    This patient had a Comprehensive Substance Abuse assessment on 4-12-22 completed by Rex Fisher CD Intern supervised by Caorl Ruano [facility] .  This patient was seen for a face to face update of the Comprehensive Substance Abuse assessment on 4/13/2022  by Zhanna Bray [facility].  INSIDE: The patient's Comprehensive Substance Abuse assessment completed on 4-13-22 is in the patient's electronic medical record in Epic in the Chart Review section under the Notes/Trans Tab.    Alcohol/Drug use since the last CD evaluation (include date of last use):     No additional substances use since the last CD evaluation  Pt denies any withdrawal symptoms.      Please note any other clinical changes since the last CD evaluation (such as medication changes, additional legal charges, detoxification admissions, overdoses, etc.)     No significant changes since the last CD evaluation        ASAM Dimensions Original scores Current Scores   I.) Intoxication and Withdrawal: 0 0   II.) Biomedical:  0 0   III.) Emotional and Behavioral:  2 2   IV.) Readiness to Change:  3 3   V.) Relapse Potential: 4 4   VI.) Recovery Environmental: 3 3     Please list clinical justifications for the above ASAM score changes since the original comprehensive assessment:     None of the ASAM scores on the six dimensions had changed since the Comprehensive Substance Abuse assessment was completed on 4-12-22.       Current CHRISTIN: Current UA:     No CHRISTIN as the patient was a direct transfer from 3 A IP detoxification unit at [facility] in [place], [place].     No UA screen as the patient was a direct transfer from 3 A IP detoxification unit at [facility] in [place], [place].        PHQ-9, JOSSIE-7   PHQ-9 on 4/13/2022  JOSSIE-7 on 5/18/2021 4/13/2022    The patient's PHQ-9 score was 3 out of 27, indicating minimal depression. The patient's JOSSIE-7 score was 5 out of 21, indicating mild anxiety.       [place] Suicide Severity  "Rating Scale (Short Version)  [place] Suicide Severity Rating (Short Version) 4/9/2022 4/10/2022 4/13/2022   Over the past 2 weeks have you felt down, depressed, or hopeless? yes - -   Over the past 2 weeks have you had thoughts of killing yourself? yes - -   Have you ever attempted to kill yourself? yes - -   When did this last happen? more than 6 months ago - -   Comments Oct 2019- cutting - -   Q1 Wished to be Dead (Past Month) yes yes -   Q2 Suicidal Thoughts (Past Month) yes yes -   Comments - passive SI \"don't have the courage to go through with it\" -   Q3 Suicidal Thought Method yes yes -   Comments \"I know there is assisted suicide in [place]\" - -   Q4 Suicidal Intent without Specific Plan yes no -   Comments \"yes If I had the money\" - -   Q5 Suicide Intent with Specific Plan no no -   Q6 Suicide Behavior (Lifetime) yes yes -   Comments - cut wrists 2019 and went to the hospital (done after break-up with girlfriend). -   Within the Past 3 Months? - no -   Level of Risk per Screen high risk moderate risk -   Most Lethal Attempt Date - - 36051   Actual Lethality/Medical Damage Code (Most Lethal Attempt) - - 2   Potential Lethality Code (Most Lethal Attempt) - - 1       Additional Risk Factors:    A recent death of someone close to the patient and/or unresolved grief and loss issues     History of impulsive or aggressive behaviors     A belief that suicide is noble   Protective Factors:    Having people in his/her life that would prevent the patient from considering a suicide attempt (i.e. young children, spouse, parents, etc.)     Having pet(s) that give companionship and/or would prevent the patient from considering a suicide attempt     An absence of mental health issues or stable and well treated mental health issues     An absence of chronic health problems or stable and well treated chronic health issues     A positive relationship with his/her clinical medical and/or mental health providers     Having " "easy access to supportive family members     Having a good community support network     Having restricted access to highly lethal means of suicide     Risk Status     3. - Moderate Risk: Consult with or inform Supervisor, Manager or Director.  If unable to speak directly with management call the Assigned Nursing Supervisor (ANS), Refer to higher level of care as indicated in consultation, Document in Epic / SBAR to counselor, Collaborate with patient / client to develop \"Patient Safety Plan\", Address in Treatment Plan, Continuous monitoring, assessment and intervention and Address in Discharge / Transition Plan     Additional information to support suicide risk rating: There was no additional information to provide at this time.     "

## 2022-04-13 NOTE — PROGRESS NOTES
[facility]  [address] [place] [id number]        ADULT CD ASSESSMENT ADDENDUM      Patient Name: Cong Tuttle  Cell Phone:   Home: [phone] (home)    Mobile:   No relevant phone numbers on file.       Email:  [email or web address]  Emergency Contact: Isabella Tuttle (mom)   Tel: [phone]    The patient reported being:  Single, no serious involvement    With which race do you identify? White    Initial Screening Questions     1. Are you currently having severe withdrawal symptoms that are putting yourself or others in danger?  No    2. Are you currently having severe medical problems that require immediate attention?  No    3. Are you currently having severe emotional or behavioral problems that are putting yourself or others at risk of harm?  No    4. Do you currently participate in community esme activities, such as attending Sikhism, temple, Scientologist or Latter-day services?  No    5. How does your spirituality impact your recovery?  It doesn't     6. Do you currently self-administer your medications?  Yes    7. Do you have a valid 's license? Yes    Mental Health Status   Physical Appearance/Attire: Appears stated age   Hygiene: well groomed   Eye Contact: at examiner and at floor   Speech Rate:  regular   Speech Volume: regular   Speech Quality: fluid   Cognitive/Perceptual:  reality based   Cognition: memory intact    Judgment: intact and able to concentrate   Insight: intact and able to concentrate   Orientation:  time, place, person and situation   Thought: logical    Hallucinations:  none   General Behavioral Tone: cooperative and withdrawn   Psychomotor Activity: no problem noted, wrings hands and leg jiggling   Gait:  no problem   Mood: normal, appropriate and anxious   Affect: congruence/appropriate   Counselor Notes: NA     Criteria for Diagnosis: DSM-5 Criteria for Substance Use Disorders      Alcohol Use Disorder   303.90 (F10.20) Severe In a controlled  environment.    Level of Care   I.) Intoxication and Withdrawal: 0   II.) Biomedical:  0   III.) Emotional and Behavioral:  2   IV.) Readiness to Change:  3   V.) Relapse Potential: 4   VI.) Recovery Environmental: 3     Initial Problem List     The patient is currently homeless  The patient is currently living in an unhealthy and/or using environment  The patient lacks relapse prevention skills  The patient has poor coping skills  The patient has poor refusal skills   The patient lacks a sober peer support network  The patient has a tendency to isolate  The patient has dual issues of MI and CD  The patient has a significant history of grief and loss issues  The patient has a significant history of guilt and shame issues    Patient/Client is willing to follow treatment recommendations.  Yes    Counselor: VALENCIA Aleman

## 2022-04-14 ENCOUNTER — HOSPITAL ENCOUNTER (OUTPATIENT)
Dept: BEHAVIORAL HEALTH | Facility: CLINIC | Age: 32
Discharge: HOME | End: 2022-04-14
Attending: FAMILY MEDICINE | Admitting: FAMILY MEDICINE
Payer: COMMERCIAL

## 2022-04-14 VITALS — TEMPERATURE: 98.1 F | OXYGEN SATURATION: 99 %

## 2022-04-14 PROCEDURE — 1002N00001 HC LODGING PLUS FACILITY CHARGE ADULT

## 2022-04-14 PROCEDURE — H2035 A/D TX PROGRAM, PER HOUR: HCPCS | Mod: HQ

## 2022-04-14 ASSESSMENT — ANXIETY QUESTIONNAIRES: GAD7 TOTAL SCORE: 0

## 2022-04-14 NOTE — GROUP NOTE
"Group Therapy Documentation    PATIENT'S NAME: Cong Tuttle  MRN:   [id number]  :   1990  ACCT. NUMBER: [id number]  DATE OF SERVICE: 22  START TIME:  9:00 AM  END TIME: 11:00 AM  FACILITATOR(S): Kelle Mcgowan LADC  TOPIC: BEH Group Therapy  Number of patients attending the group:  7  Group Length:  2 Hours    Group Therapy Type: Recovery strategies    Summary of Group / Topics Discussed:    Recovery Principles, Disease of addiction, and Relapse prevention      Group Attendance:  Attended group session    Patient's response to the group topic/interactions:  cooperative with task    Patient appeared to be Actively participating.        Client specific details:  Cong attended AM small process group. Patient engaged in check ins and the daily question, \" what is something you would try, if you knew you wouldn't fail?\"  No other concerns reported.       "

## 2022-04-14 NOTE — PROGRESS NOTES
Pt wanting to speak with his internal medicine provider about Lexapro prior to making appt with Psychiatry.  Pt has upcoming appt early May with:        Pt will notify LPRN with any changes with this decision

## 2022-04-14 NOTE — GROUP NOTE
Group Therapy Documentation    PATIENT'S NAME: oCng Tuttle  MRN:   [id number]  :   1990  ACCT. NUMBER: [id number]  DATE OF SERVICE: 22  START TIME: 12:30 PM  END TIME:  2:30 PM  FACILITATOR(S): Thomas Villanueva LADC  TOPIC: BEH Group Therapy  Number of patients attending the group:  7  Group Length:  2 Hours    Group Therapy Type: Recovery strategies    Summary of Group / Topics Discussed:    Disease of addiction      Group Attendance:  Attended group session    Patient's response to the group topic/interactions:  cooperative with task    Patient appeared to be Actively participating.        Client specific details:  Cong participated and interacted appropriately with peers and staff in PM group. No triggers to use noted or discussed.

## 2022-04-14 NOTE — PROGRESS NOTES
Name: Cong Tuttle  Date: 4/14/2022  Medical Record: [id number]  Envelope Number: [id number]  List of Contents (List each item separately in new row):   Trazodone 50 mg (2); Quetiapine 25 mg; Clindamycin PH 1% soln.  Admission:  I am responsible for any personal items that are not sent to the safe or pharmacy.  [facility] is not responsible for loss, theft or damage of any property in my possession.  Patient Signature:  ___________________________________________       Date/Time:__________________________    Staff Signature: __________________________________       Date/Time:__________________________    2nd Staff person, if patient is unable/unwilling to sign:      __________________________________________________________       Date/Time: __________________________  Discharge:  [facility] has returned all of my personal belongings:    Patient Signature: ________________________________________     Date/Time: ____________________________________    Staff Signature: ______________________________________     Date/Time:_____________________________________

## 2022-04-14 NOTE — GROUP NOTE
Group Therapy Documentation    PATIENT'S NAME: Cong Tuttle  MRN:   [id number]  :   1990  ACCT. NUMBER: [id number]  DATE OF SERVICE: 22  START TIME:  3:00 PM  END TIME:  4:00 PM  FACILITATOR(S): Jannet Merlos; Bert Holden LADC; Zhanna Bray LADC  TOPIC: BEH Group Therapy  Number of patients attending the group: 32  Group Length:  1 Hours    Group Therapy Type: Recovery strategies    Summary of Group / Topics Discussed:    Recovery Principles, Relationship/socialization, and Disease of addiction      Group Attendance:  Attended group session    Patient's response to the group topic/interactions:  cooperative with task    Patient appeared to be Attentive and Engaged.        Client specific details:  .

## 2022-04-14 NOTE — PROGRESS NOTES
Name: Cong Tuttle  Date: 4/13/2022  Medical Record: [id number]  Envelope Number: [id number]  List of Contents (List each item separately in new row):     One cell phone  One     Admission:  I am responsible for any personal items that are not sent to the safe or pharmacy.  [facility] is not responsible for loss, theft or damage of any property in my possession.    Patient Signature:  ___________________________________________       Date/Time:__________________________    Staff Signature: __________________________________       Date/Time:__________________________    2nd Staff person, if patient is unable/unwilling to sign:      __________________________________________________________       Date/Time: __________________________    Discharge:  [facility] has returned all of my personal belongings:    Patient Signature: ________________________________________     Date/Time: ____________________________________    Staff Signature: ______________________________________     Date/Time:_____________________________________
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
## 2022-04-15 ENCOUNTER — HOSPITAL ENCOUNTER (OUTPATIENT)
Dept: BEHAVIORAL HEALTH | Facility: CLINIC | Age: 32
Discharge: HOME | End: 2022-04-15
Attending: FAMILY MEDICINE | Admitting: FAMILY MEDICINE
Payer: COMMERCIAL

## 2022-04-15 VITALS — OXYGEN SATURATION: 98 % | TEMPERATURE: 97.8 F

## 2022-04-15 PROCEDURE — 1002N00001 HC LODGING PLUS FACILITY CHARGE ADULT

## 2022-04-15 PROCEDURE — H2035 A/D TX PROGRAM, PER HOUR: HCPCS | Mod: HQ

## 2022-04-15 NOTE — PROGRESS NOTES
Writer met with patient and discussed the concerns regarding coupling behaviors.   Patient acknowledged understanding. Writer explained female restriction, and expressed if patient continues he may be discharged from program.     VALENCIA Lay

## 2022-04-15 NOTE — GROUP NOTE
Group Therapy Documentation    PATIENT'S NAME: Cong Tuttle  MRN:   [id number]  :   1990  ACCT. NUMBER: [id number]  DATE OF SERVICE: 4/15/22  START TIME: 12:30 PM  END TIME:  2:30 PM  FACILITATOR(S): Kelle Mcgowan LADC  TOPIC: BEH Group Therapy  Number of patients attending the group:  7  Group Length:  2 Hours    Group Therapy Type: Recovery strategies    Summary of Group / Topics Discussed:    Recovery Principles      Group Attendance:  Attended group session    Patient's response to the group topic/interactions:  cooperative with task    Patient appeared to be Actively participating.        Client specific details:  Cong attended PM small group. Patient viewed film addressing addiction, relationships, 12 step meetings, and mental health. Patients enraged in discussion after the film to processes scenes that may have presented triggers/cravings.

## 2022-04-15 NOTE — GROUP NOTE
Group Therapy Documentation    PATIENT'S NAME: Cong Tuttle  MRN:   [id number]  :   1990  ACCT. NUMBER: [id number]  DATE OF SERVICE: 4/15/22  START TIME:  9:00 AM  END TIME: 11:00 AM  FACILITATOR(S): Bert Holden LADC  TOPIC: BEH Group Therapy  Number of patients attending the group:  6  Group Length:  2 Hours    Group Therapy Type: Recovery strategies    Summary of Group / Topics Discussed:    Recovery Principles, Sober coping skills, Relationship/socialization, Co-occurring illnesses symptom management, and Disease of addiction      Group Attendance:  Attended group session    Patient's response to the group topic/interactions:  cooperative with task, discussed personal experience with topic, listened actively and offered helpful suggestions to peers    Patient appeared to be Actively participating, Attentive and Engaged.        Client specific details:  Patient expressed frustration regarding his inability to feel emotions. Patient indicated that he has experienced a void of emotion since breaking up with his girlfriend in early . Peers responded that patient has exhibited a pattern of depressive symptoms. Patient stated that he has declined both dx and medications presently prescribed due to personal beliefs. Staff RN was notified and consult arranged.

## 2022-04-16 ENCOUNTER — HOSPITAL ENCOUNTER (OUTPATIENT)
Dept: BEHAVIORAL HEALTH | Facility: CLINIC | Age: 32
Discharge: HOME | End: 2022-04-16
Attending: FAMILY MEDICINE | Admitting: FAMILY MEDICINE
Payer: COMMERCIAL

## 2022-04-16 VITALS — OXYGEN SATURATION: 97 % | TEMPERATURE: 97.6 F

## 2022-04-16 PROCEDURE — 1002N00001 HC LODGING PLUS FACILITY CHARGE ADULT

## 2022-04-16 PROCEDURE — H2035 A/D TX PROGRAM, PER HOUR: HCPCS | Mod: HQ

## 2022-04-16 NOTE — GROUP NOTE
Group Therapy Documentation    PATIENT'S NAME: Cong Tuttle  MRN:   [id number]  :   1990  ACCT. NUMBER: [id number]  DATE OF SERVICE: 22  START TIME: 12:30 PM  END TIME:  2:30 PM  FACILITATOR(S): Breezy Pandya LADC; Thomas Villanueva LADC  TOPIC: BEH Group Therapy  Number of patients attending the group:  6  Group Length:  2 Hours    Group Therapy Type: Recovery strategies    Summary of Group / Topics Discussed:    Relationship/socialization      Group Attendance:  Attended group session    Patient's response to the group topic/interactions:  cooperative with task    Patient appeared to be Attentive.        Client specific details:  Cong participated in the afternoon lecture which was on relationships.

## 2022-04-16 NOTE — GROUP NOTE
Psychoeducation Group Documentation    PATIENT'S NAME: Cong Tuttle  MRN:   [id number]  :   1990  ACCT. NUMBER: [id number]  DATE OF SERVICE: 22  START TIME:  9:00 AM  END TIME: 11:00 AM  FACILITATOR(S): Breezy Pandya LADC; Bert Holden LADC  TOPIC: BEH Pyschoeducation  Number of patients attending the group:  6  Group Length:  2 Hours    Skills Group Therapy Type: Recovery skills and Healthy behaviors development    Summary of Group / Topics Discussed:    Relationship/social skills and Symptom management skills          Group Attendance:  Attended group session    Patient's response to the group topic/interactions:  cooperative with task    Patient appeared to be Attentive and Engaged.         Client specific details:  Cong participated in morning lecture. It began with a speaker sharing his story and was followed by a nursing lecture on HIV/AIDS.

## 2022-04-17 ENCOUNTER — HOSPITAL ENCOUNTER (OUTPATIENT)
Dept: BEHAVIORAL HEALTH | Facility: CLINIC | Age: 32
Discharge: HOME | End: 2022-04-17
Attending: FAMILY MEDICINE | Admitting: FAMILY MEDICINE
Payer: COMMERCIAL

## 2022-04-17 VITALS — TEMPERATURE: 98 F | OXYGEN SATURATION: 96 %

## 2022-04-17 PROCEDURE — 1002N00001 HC LODGING PLUS FACILITY CHARGE ADULT

## 2022-04-17 PROCEDURE — H2035 A/D TX PROGRAM, PER HOUR: HCPCS | Mod: HQ

## 2022-04-17 NOTE — PROGRESS NOTES
"Counselors contacted writer with concerns that pt's depression is poorly managed, counselors observed, slowed speech and flat affect. \"Client specific details:  Patient expressed frustration regarding his inability to feel emotions. Patient indicated that he has experienced a void of emotion since breaking up with his girlfriend in early 2021. Peers responded that patient has exhibited a pattern of depressive symptoms. Patient stated that he has declined both dx and medications presently prescribed due to personal beliefs\" Pt met with RN and said he was open to talking with a psychiatric provider while at +. Writer will check in with pt on 4/18 to check in and see if he is still interested in seeing someone fr medication management.   "

## 2022-04-17 NOTE — GROUP NOTE
Group Therapy Documentation    PATIENT'S NAME: Cong Tuttle  MRN:   [id number]  :   1990  ACCT. NUMBER: [id number]  DATE OF SERVICE: 22  START TIME: 12:30 PM  END TIME:  1:30 PM  FACILITATOR(S): Bert Holden LADC; Kelle Mcgowan LADC; Marisa Granda  TOPIC: BEH Group Therapy  Number of patients attending the group:  28  Group Length:  1 Hours    Group Therapy Type: Recovery strategies    Summary of Group / Topics Discussed:    Recovery Principles, Relationship/socialization, and Emotions/expression      Group Attendance:  Attended group session    Patient's response to the group topic/interactions:  cooperative with task and discussed personal experience with topic    Patient appeared to be Actively participating, Attentive and Engaged.        Client specific details:  .

## 2022-04-17 NOTE — GROUP NOTE
"Group Therapy Documentation    PATIENT'S NAME: Cong Tuttle  MRN:   [id number]  :   1990  ACCT. NUMBER: [id number]  DATE OF SERVICE: 22  START TIME:  8:45 AM  END TIME: 10:45 AM  FACILITATOR(S): Jannet Merlos  TOPIC: BEH Group Therapy  Number of patients attending the group:  28    Group Length:  2 Hours    Group Therapy Type: Recovery strategies, Daily living/independence skills, and Health and wellbeing     Summary of Group / Topics Discussed:    Recovery Principles, Sober coping skills, Relationship/socialization, and Relapse prevention      Group Attendance:  Attended group session    Patient's response to the group topic/interactions:  cooperative with task    Patient appeared to be Actively participating, Attentive and Engaged.        Client specific details: Patient attended a \"Spirituality Workshop\" and was attentive and participative.      "

## 2022-04-18 ENCOUNTER — HOSPITAL ENCOUNTER (OUTPATIENT)
Dept: BEHAVIORAL HEALTH | Facility: CLINIC | Age: 32
Discharge: HOME | End: 2022-04-18
Attending: FAMILY MEDICINE | Admitting: FAMILY MEDICINE
Payer: COMMERCIAL

## 2022-04-18 VITALS — OXYGEN SATURATION: 97 % | TEMPERATURE: 97.6 F

## 2022-04-18 PROCEDURE — 1002N00001 HC LODGING PLUS FACILITY CHARGE ADULT

## 2022-04-18 PROCEDURE — H2035 A/D TX PROGRAM, PER HOUR: HCPCS | Mod: HQ

## 2022-04-18 PROCEDURE — H2035 A/D TX PROGRAM, PER HOUR: HCPCS

## 2022-04-18 NOTE — PROGRESS NOTES
"See progress note from writer on 4/17, writer met with pt again this am and he appeared to have a much brighter affect and endorsed that his mood was much improved. Pt stated he had just had a \"bad day\" and now was doing better. Pt denies any SI, thoughts plan or intent and denies any thoughts of self-harm. Pt was again offered outside psychiatry appt and transitions psych appt which pt declined at this time. Pt will see Dr. Sparks for addiction med on 4/28 and his PCP on 5/3. Pt agreed to alert RN staff if he wanted to see psychiatry at any time during his stay or wanted assistance setting up outpatient psychiatry appt.     Writer gave pt information to schedule with an outpatient therapist that specializes in eating disorders and substance use disorder.   "

## 2022-04-18 NOTE — PROGRESS NOTES
Patient:  Cong Tuttle    Day Monday Tuesday Wednesday Thursday Friday Saturday Jr  Group Hours    0 hours 0 hours 0 hours 4 hours 4 hours 4 hours 2 hours  Skills Hours    0 hours 0 hours 0 hours 1.0 hours 0 hours 0 hours 1 hours  Individual Session (LADC)     0 hours 0 hours 0 hours 0 hours 0 hours 0 hours 0 hours  Individual Session  (psychotherapy)    0 hours 0 hours 0 hours 0 hours 0 hours 0 hours 0 hours  Peer-led Recovery Group    0 hours 0 hours 0 hours 1.0 hours 1.0 hours 1.0 hours 1.0 hours              Adult CD Progress Note and Treatment Plan Review     Attendance  Please refer to OP BEH CD Adult Attendance Record Documentation Flowsheet    Support group attended this week: Yes     Reporting sobriety: Yes     Treatment Plan     Treatment Plan Review competed on: 4/18/2022       Client preferred learning style:   Verbal  Hands-on  Demonstration  Reading/ written    Staff Members contributing: VALENCIA Bryson; VALENCIA Lay                 Received Supervision: No     Client: Pt contributed to goals and plan.    Client received copy of plan/revised plan:     Client agrees with plan/revised plan: Yes        Changes to Treatment Plan: Pt received his tx plan this past week.     New Goals added since last review: Relapse prevention, address mental health needs, build sober support network, actively find a sponsor, attend 2-3 12-Step meetings per week, tx plan assignments, and attend individual therapy sessions.      Goals worked on since last review: Aftercare planning, sobriety, tx plan assignments, group therapy, build sober support network, and psychoeducation. Pt reported completing his self-compassion and Big Book assignments this past week.     Strategies effective: Yes     Strategies need these changes: None at this time    1) Care Coordination Activities: Pt expressed an interest in attending therapy and sober support meetings as his aftercare plan for LP+.    2) Medical, Mental  "Health, and other appointments the client attended: Pt reported attending an individual psychotherapy session and a visit with his PCP this past week.   3) Medication issues: Pt reported no concerns at this time.  4) Physical and mental health problems: Pt reported no concerns at this time.  5) Any changes in Vulnerable Adult Status? No. If yes, add to treatment plan and individual abuse prevention plan.  6) Review and evaluation of the individual abuse prevention plan: Current IAPP for this program is adequate for this client.    ASAM Risk Rating:    Dimension 1 0: Pt reported his last use date as 4/9/2022. Pt reported no PAWS symptomatology this past week. Pt will be monitored.     Dimension 2 0: Pt denied having medical or medication issues this past week. Pt did not attend any healthcare appointments this past week and denied scheduling any appointments for the future. Patient denies any biomedical concerns that would interfere with full participation in treatment programming at this time. Patient reports that he is currently medication compliant and appears able to access medical aid as needed.  Patient will continue to be monitored throughout treatment.    Dimension 3 2: Pt denied having suicidal thoughts at this time. However, upon admission pt reported suicidal ideation w/o intent. Pt reported no significant changes in his mood or changes in his stress level this past week. Pt reported using \"elliptical, walking, and hanging with group mates\" as his coping techniques for dealing with difficult emotions this past week. Pt reported no triggers to use this past week.    Dimension 4 3: Pt expresses internal motivation for change. Pt is active in group process, accepts and provides feedback, has good insight, and appears engaged. Pt is supportive of his group peers. Pt reported that \"wanting to change: family relationships, work, and living situation\" are what/ who motivated him to be sober and to stay in " "treatment this week. Pt reports that his relationship with peers and staff was \"good except night staff, Jaya and Arvind make me uncomfortable and feel unsafe, inappropriate\" this past week.    Dimension 5 4: Pt reported that his cravings were at a 2 this past week on a scale of 1 to 10, 10 being the highest/ most severe. Pt reported that \"exercise and socialize\" have been his coping skills he used to manage cravings this past week.       Dimension 6 3: Pt expressed an interest in attending therapy and sober support meetings as his aftercare plan for LP+.      [place] Suicide Severity Rating Scale (Short Version)  [place] Suicide Severity Rating (Short Version) 4/9/2022 4/10/2022 4/13/2022   Over the past 2 weeks have you felt down, depressed, or hopeless? yes - -   Over the past 2 weeks have you had thoughts of killing yourself? yes - -   Have you ever attempted to kill yourself? yes - -   When did this last happen? more than 6 months ago - -   Comments Oct 2019- cutting - -   Q1 Wished to be Dead (Past Month) yes yes -   Q2 Suicidal Thoughts (Past Month) yes yes -   Comments - passive SI \"don't have the courage to go through with it\" -   Q3 Suicidal Thought Method yes yes -   Comments \"I know there is assisted suicide in [place]\" - -   Q4 Suicidal Intent without Specific Plan yes no -   Comments \"yes If I had the money\" - -   Q5 Suicide Intent with Specific Plan no no -   Q6 Suicide Behavior (Lifetime) yes yes -   Comments - cut wrists 2019 and went to the hospital (done after break-up with girlfriend). -   Within the Past 3 Months? - no -   Level of Risk per Screen high risk moderate risk -   Most Lethal Attempt Date - - 77300   Actual Lethality/Medical Damage Code (Most Lethal Attempt) - - 2   Potential Lethality Code (Most Lethal Attempt) - - 1      Pt's current risk rating: \"Moderate\"    Any changes in Vulnerable Adult Status? No.  If yes, add to treatment plan and individual abuse prevention plan.    Family " "Involvement:   Pt reported \"Facetiming with his parents and connecting with friends this past week.\"      Data:   Pt offered feedback, had good insight, and patient actively participated in group. Pt shares openly during group check-in.    Pt reported that his recreation activities he engaged in this past week were: \"games, socializing, art therapy, and the elliptical\"      Intervention:   Counselor feedback  Group feedback  Relapse prevention  Aftercare planning  Cognitive behavior therapy  Counselor feedback  Education  Emotional management  Motivational enhancement therapy   Twelve Step facilitation  Mental health education  Pt and counselor reviewed and signed ISP and assessment summary.      Assessment:   Stages of Change Model  Contemplation     Appears/ Sounds:  Cooperative  Motivated  Engaged      Plan:  Focus on recovery environment  Monitor emotional/physical health    Continue group therapy, go to AA/NA meetings when available, work with sponsor, build sober support network, engage in daily structured activities, and have sober fun.            VALENCIA Bryson        "

## 2022-04-18 NOTE — GROUP NOTE
"Group Therapy Documentation    PATIENT'S NAME: Cong Tuttle  MRN:   [id number]  :   1990  ACCT. NUMBER: [id number]  DATE OF SERVICE: 22  START TIME:  9:00 AM  END TIME: 11:00 AM  FACILITATOR(S): Kelle Mcgowan LADC  TOPIC: BEH Group Therapy  Number of patients attending the group:  6  Group Length:  2 Hours    Group Therapy Type: Recovery strategies    Summary of Group / Topics Discussed:    Recovery Principles      Group Attendance:  Attended group session    Patient's response to the group topic/interactions:  cooperative with task    Patient appeared to be Actively participating.        Client specific details:  Cong attended AM small group. Patient participated in daily check ins and the daily question, \" What is your favorite family tradition?\".  Patient shared about making pasta and meatballs with family for a yearly tradition. Patient engaged in discussion about cross addiction and control. Patient shared feedback to a peer who shared an assignment about boundaries.       "

## 2022-04-19 ENCOUNTER — HOSPITAL ENCOUNTER (OUTPATIENT)
Dept: BEHAVIORAL HEALTH | Facility: CLINIC | Age: 32
Discharge: HOME | End: 2022-04-19
Attending: FAMILY MEDICINE | Admitting: FAMILY MEDICINE
Payer: COMMERCIAL

## 2022-04-19 VITALS — TEMPERATURE: 97.9 F | OXYGEN SATURATION: 97 %

## 2022-04-19 PROCEDURE — 1002N00001 HC LODGING PLUS FACILITY CHARGE ADULT

## 2022-04-19 PROCEDURE — H2035 A/D TX PROGRAM, PER HOUR: HCPCS | Mod: HQ

## 2022-04-19 NOTE — ADDENDUM NOTE
Encounter addended by: Thoams Villanueva LADC on: 4/19/2022 8:51 AM   Actions taken: Clinical Note Signed
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
## 2022-04-19 NOTE — GROUP NOTE
"Group Therapy Documentation    PATIENT'S NAME: Cong Tuttle  MRN:   [id number]  :   1990  ACCT. NUMBER: [id number]  DATE OF SERVICE: 22  START TIME:  9:00 AM  END TIME: 11:00 AM  FACILITATOR(S): Kelle Mcgowan LADC  TOPIC: BEH Group Therapy  Number of patients attending the group:  7  Group Length:  2 Hours    Group Therapy Type: Recovery strategies    Summary of Group / Topics Discussed:    Recovery Principles       Group Attendance:  Attended group session    Patient's response to the group topic/interactions:  cooperative with task    Patient appeared to be Actively participating.        Client specific details:  Cong Patient engaged in daily check ins and the question of the day. Patient engaged in discussion about perfectionism and anger management. Patient shared feedback to peer who shared \"drug history.\"  No other concerns reported.        "

## 2022-04-19 NOTE — GROUP NOTE
Psychoeducation Group Documentation    PATIENT'S NAME: Cong Tuttle  MRN:   [id number]  :   1990  ACCT. NUMBER: [id number]  DATE OF SERVICE: 22  START TIME:  3:00 PM  END TIME:  4:00 PM  FACILITATOR(S): Zhanna Bray LADC; Kelle Mcgowan LADC; Jannet Merlos  TOPIC: BEH Pyschoeducation  Number of patients attending the group:  29  Group Length:  1 Hours    Skills Group Therapy Type: Recovery skills    Summary of Group / Topics Discussed:    Relapse prevention skills    Group Attendance:  Attended group session    Patient's response to the group topic/interactions:  cooperative with task    Patient appeared to be Actively participating, Attentive and Engaged.         Client specific details: Pt listened attentively to a lecture on relapse prevention and participated in a group activity of compiling and comparing red lights/green lights for sobriety vs. relapse.

## 2022-04-19 NOTE — GROUP NOTE
Group Therapy Documentation    PATIENT'S NAME: Cong Tuttle  MRN:   [id number]  :   1990  ACCT. NUMBER: [id number]  DATE OF SERVICE: 22  START TIME: 12:30 PM  END TIME:  2:30 PM  FACILITATOR(S): Thomas Villanueva LADC  TOPIC: BEH Group Therapy  Number of patients attending the group:  6  Group Length:  2 Hours    Group Therapy Type: Health and wellbeing     Summary of Group / Topics Discussed:    Spiritual Care      Group Attendance:  Attended group session    Patient's response to the group topic/interactions:  cooperative with task    Patient appeared to be Actively participating.        Client specific details:  Cong participated and interacted appropriately with peers and staff in spirituality group. No triggers to use noted or discussed. Pt attended a one hour individual psychotherapy session during group.

## 2022-04-19 NOTE — PROGRESS NOTES
Late Note Entry:    Patient:  Cong Tuttle    Date: April 19, 2022    Comprehensive Assessment UPDATE       Comprehensive Summary Update and Review  Counselor met with patient on 4/14/2022 and reviewed the Comprehensive Assessment.      There were no changes/updates identified by patient or in chart entries.

## 2022-04-19 NOTE — ADDENDUM NOTE
Encounter addended by: Thomas Villanueva LADC on: 4/19/2022 8:58 AM   Actions taken: Charge Capture section accepted

## 2022-04-19 NOTE — ADDENDUM NOTE
Encounter addended by: Thomas Villanueva LADC on: 4/19/2022 8:56 AM   Actions taken: Clinical Note Signed

## 2022-04-19 NOTE — GROUP NOTE
Group Therapy Documentation    PATIENT'S NAME: Cong Tuttle  MRN:   [id number]  :   1990  ACCT. NUMBER: [id number]  DATE OF SERVICE: 22  START TIME: 12:30 PM  END TIME:  2:30 PM  FACILITATOR(S): Zhanna Bray LADC  TOPIC: BEH Group Therapy  Number of patients attending the group:  7  Group Length:  2 Hours    Group Therapy Type: Recovery strategies, Emotion processing, and Daily living/independence skills    Summary of Group / Topics Discussed:    Recovery Principles, Sober coping skills, and Relationship/socialization    Group Attendance:  Attended group session    Patient's response to the group topic/interactions:  cooperative with task and expressed readiness to alter behaviors    Patient appeared to be Actively participating, Attentive and Engaged.        Client specific details: Pt offered appropriate feedback to his peers, and participated in a group discussion of codependency (& its effect on recovery), healthy relationships, and red flags/green lights.

## 2022-04-20 ENCOUNTER — HOSPITAL ENCOUNTER (OUTPATIENT)
Dept: BEHAVIORAL HEALTH | Facility: CLINIC | Age: 32
Discharge: HOME | End: 2022-04-20
Attending: FAMILY MEDICINE | Admitting: FAMILY MEDICINE
Payer: COMMERCIAL

## 2022-04-20 VITALS — TEMPERATURE: 98 F | OXYGEN SATURATION: 98 %

## 2022-04-20 PROCEDURE — H2035 A/D TX PROGRAM, PER HOUR: HCPCS | Mod: HQ

## 2022-04-20 PROCEDURE — 1002N00001 HC LODGING PLUS FACILITY CHARGE ADULT

## 2022-04-20 NOTE — GROUP NOTE
Group Therapy Documentation    PATIENT'S NAME: Cong Tuttle  MRN:   [id number]  :   1990  ACCT. NUMBER: [id number]  DATE OF SERVICE: 22  START TIME: 12:30 PM  END TIME:  2:30 PM  FACILITATOR(S): Thomas Villanueva LADC  TOPIC: BEH Group Therapy  Number of patients attending the group:  7  Group Length:  2 Hours    Group Therapy Type: Recovery strategies    Summary of Group / Topics Discussed:    Balanced lifestyle      Group Attendance:  Attended group session    Patient's response to the group topic/interactions:  cooperative with task    Patient appeared to be Actively participating.        Client specific details:  Cong participated and interacted appropriately with peers and staff in PM group. No triggers to use noted or discussed.

## 2022-04-20 NOTE — GROUP NOTE
Group Therapy Documentation    PATIENT'S NAME: Cong Tuttle  MRN:   [id number]  :   1990  ACCT. NUMBER: [id number]  DATE OF SERVICE: 22  START TIME:  9:45 AM  END TIME: 11:30 AM  FACILITATOR(S): Zhanna Bray LADC  TOPIC: BEH Group Therapy  Number of patients attending the group:  7  Group Length:  2 Hours    Group Therapy Type: Recovery strategies and Emotion processing    Summary of Group / Topics Discussed:    Recovery Principles, Relationship/socialization, and Relapse prevention    Group Attendance:  Attended group session    Patient's response to the group topic/interactions:  cooperative with task and discussed personal experience with topic    Patient appeared to be Actively participating, Attentive and Engaged.        Client specific details: during check-in pt reported feeling tense. He shared well-wishes for a graduated peer and participated in a guided relaxation. He took part in a group discussion of relationships in recovery/healthy partners and how to cope with loss/death.

## 2022-04-20 NOTE — GROUP NOTE
Psychoeducation Group Documentation    PATIENT'S NAME: Cong Tuttle  MRN:   [id number]  :   1990  ACCT. NUMBER: [id number]  DATE OF SERVICE: 22  START TIME:  8:30 AM  END TIME:  9:30 AM  FACILITATOR(S): Reynaldo Al LADC; Umberto Sparks MD  TOPIC: BEH Pyschoeducation  Number of patients attending the group:  7  Group Length:  1 Hours    Skills Group Therapy Type: Emotion regulation skills and Medication education    Summary of Group / Topics Discussed:    Symptom management skills and Medication management skills          Group Attendance:  Attended group session    Patient's response to the group topic/interactions:  cooperative with task    Patient appeared to be Attentive.         Client specific details:  Cong gave appropriate feedback..

## 2022-04-21 ENCOUNTER — HOSPITAL ENCOUNTER (OUTPATIENT)
Dept: BEHAVIORAL HEALTH | Facility: CLINIC | Age: 32
Discharge: HOME | End: 2022-04-21
Attending: FAMILY MEDICINE | Admitting: FAMILY MEDICINE
Payer: COMMERCIAL

## 2022-04-21 VITALS — TEMPERATURE: 97.7 F | OXYGEN SATURATION: 100 %

## 2022-04-21 PROCEDURE — 1002N00001 HC LODGING PLUS FACILITY CHARGE ADULT

## 2022-04-21 PROCEDURE — H2035 A/D TX PROGRAM, PER HOUR: HCPCS | Mod: HQ

## 2022-04-21 NOTE — GROUP NOTE
Psychoeducation Group Documentation    PATIENT'S NAME: Cong Tuttle  MRN:   [id number]  :   1990  ACCT. NUMBER: [id number]  DATE OF SERVICE: 22  START TIME:  3:00 PM  END TIME:  4:00 PM  FACILITATOR(S): Bert Holden LADC; Breezy Pandya LADC; Velma Anthony LADC  TOPIC: BEH Pyschoeducation  Number of patients attending the group:  27  Group Length:  1 Hours    Skills Group Therapy Type: Recovery skills    Summary of Group / Topics Discussed:    Symptom management skills, Medication management skills, and Relapse prevention skills          Group Attendance:  Attended group session    Patient's response to the group topic/interactions:  cooperative with task    Patient appeared to be Attentive and Engaged.         Client specific details:  .

## 2022-04-21 NOTE — GROUP NOTE
Group Therapy Documentation    PATIENT'S NAME: Cong Tuttle  MRN:   [id number]  :   1990  ACCT. NUMBER: [id number]  DATE OF SERVICE: 22  START TIME: 12:30 PM  END TIME:  2:30 PM  FACILITATOR(S): Thomas Villanueva LADC  TOPIC: BEH Group Therapy  Number of patients attending the group:  8  Group Length:  2 Hours    Group Therapy Type: Emotion processing    Summary of Group / Topics Discussed:    Sober coping skills      Group Attendance:  Attended group session    Patient's response to the group topic/interactions:  cooperative with task    Patient appeared to be Actively participating.        Client specific details:  Cong participated and interacted appropriately with peers and staff in PM group. No triggers to use noted or discussed.

## 2022-04-21 NOTE — GROUP NOTE
"Group Therapy Documentation    PATIENT'S NAME: Cong Tuttle  MRN:   [id number]  :   1990  ACCT. NUMBER: [id number]  DATE OF SERVICE: 22  START TIME:  9:00 AM  END TIME: 11:00 AM  FACILITATOR(S): Kelle Mcgowan LADC  TOPIC: BEH Group Therapy  Number of patients attending the group:  8  Group Length:  2 Hours    Group Therapy Type: Recovery strategies    Summary of Group / Topics Discussed:    Recovery Principles, Sober coping skills, and Balanced lifestyle      Group Attendance:  Attended group session    Patient's response to the group topic/interactions:  cooperative with task    Patient appeared to be Actively participating.        Client specific details:  Cong attended AM small group. Pt participated in daily check ins and the question of the day, \" what is one thing In your past that was destructive towards your recovery.\" Patient engaged in discussion regarding the difference in living a sober life, and a life of recovery. No other concerns reported.          "

## 2022-04-22 ENCOUNTER — HOSPITAL ENCOUNTER (OUTPATIENT)
Dept: BEHAVIORAL HEALTH | Facility: CLINIC | Age: 32
Discharge: HOME | End: 2022-04-22
Attending: FAMILY MEDICINE | Admitting: FAMILY MEDICINE
Payer: COMMERCIAL

## 2022-04-22 VITALS — TEMPERATURE: 96.5 F | OXYGEN SATURATION: 100 %

## 2022-04-22 PROCEDURE — H2035 A/D TX PROGRAM, PER HOUR: HCPCS | Mod: HQ

## 2022-04-22 PROCEDURE — 1002N00001 HC LODGING PLUS FACILITY CHARGE ADULT

## 2022-04-22 NOTE — PROGRESS NOTES
Patient met with program  to review and initiate aftercare placement opportunities. Patient presents as a high risk for relapse with limited independent sober living skills.Patient states that he will be talking with his parents over the weekend to explore any financial assistance they might provide with rent.   Patient indicated an interest in continuum of care options.Various programs were reviewed with the patient. Documentation has been forwarded to [facility] for placement consideration.

## 2022-04-22 NOTE — GROUP NOTE
"Group Therapy Documentation    PATIENT'S NAME: Cong Tuttle  MRN:   [id number]  :   1990  ACCT. NUMBER: [id number]  DATE OF SERVICE: 22  START TIME:  9:00 AM  END TIME: 11:00 AM  FACILITATOR(S): Kelle Mcgowan LADC  TOPIC: BEH Group Therapy  Number of patients attending the group:  7  Group Length:  2 Hours    Group Therapy Type: Recovery strategies    Summary of Group / Topics Discussed:    Recovery Principles      Group Attendance:  Attended group session    Patient's response to the group topic/interactions:  cooperative with task    Patient appeared to be Actively participating.        Client specific details:  Cong attended AM small group. Patient engaged in daily check ins and the question of the day, \" what's your favorite part about spring?\" Patient celebrated a peers graduation. Patient presented \"Resentments\" assignment.     "

## 2022-04-22 NOTE — GROUP NOTE
Group Therapy Documentation    PATIENT'S NAME: Cong Tuttle  MRN:   [id number]  :   1990  ACCT. NUMBER: [id number]  DATE OF SERVICE: 22  START TIME: 12:30 PM  END TIME:  2:30 PM  FACILITATOR(S): Thomas Villanueva LADC  TOPIC: BEH Group Therapy  Number of patients attending the group:  6  Group Length:  2 Hours    Group Therapy Type: Health and wellbeing     Summary of Group / Topics Discussed:    Disease of addiction      Group Attendance:  Attended group session    Patient's response to the group topic/interactions:  cooperative with task    Patient appeared to be Actively participating.        Client specific details:  Cong participated and interacted appropriately with peers and staff in PM group. No triggers to use noted or discussed.

## 2022-04-23 ENCOUNTER — HOSPITAL ENCOUNTER (OUTPATIENT)
Dept: BEHAVIORAL HEALTH | Facility: CLINIC | Age: 32
Discharge: HOME | End: 2022-04-23
Attending: FAMILY MEDICINE | Admitting: FAMILY MEDICINE
Payer: COMMERCIAL

## 2022-04-23 VITALS — OXYGEN SATURATION: 96 % | TEMPERATURE: 97 F

## 2022-04-23 PROCEDURE — H2035 A/D TX PROGRAM, PER HOUR: HCPCS | Mod: HQ

## 2022-04-23 PROCEDURE — 1002N00001 HC LODGING PLUS FACILITY CHARGE ADULT

## 2022-04-23 NOTE — GROUP NOTE
Group Therapy Documentation    PATIENT'S NAME: Cong Tuttle  MRN:   [id number]  :   1990  ACCT. NUMBER: [id number]  DATE OF SERVICE: 22  START TIME:  9:00 AM  END TIME: 11:00 AM  FACILITATOR(S): Erin Keane LADC; Melisa Bueno LADC; Grace Smalls LADC  TOPIC: BEH Group Therapy  Number of patients attending the group: 26  Group Length:  2 Hours    Group Therapy Type: Recovery strategies    Summary of Group / Topics Discussed:    Recovery Principles and Disease of addiction      Group Attendance:  Attended group session    Patient's response to the group topic/interactions:  cooperative with task    Patient appeared to be Actively participating, Attentive and Engaged.        Client specific details: Cong was an active participant in morning session of weekend workshop.

## 2022-04-23 NOTE — GROUP NOTE
Group Therapy Documentation    PATIENT'S NAME: Cong Tuttle  MRN:   [id number]  :   1990  ACCT. NUMBER: [id number]  DATE OF SERVICE: 22  START TIME: 12:30 PM  END TIME:  2:30 PM  FACILITATOR(S): Erin Keane LADC; Melisa Bueno LADC; Grace Smalls LADC  TOPIC: BEH Group Therapy  Number of patients attending the group:  26  Group Length:  2 Hours    Group Therapy Type: Recovery strategies    Summary of Group / Topics Discussed:    Relationship/socialization      Group Attendance:  Attended group session    Patient's response to the group topic/interactions:  cooperative with task    Patient appeared to be Engaged.        Client specific details: Pt was appropriate and attentive in group, during weekend relationship workshop lecture on Honesty in Recovery.

## 2022-04-24 ENCOUNTER — HOSPITAL ENCOUNTER (OUTPATIENT)
Dept: BEHAVIORAL HEALTH | Facility: CLINIC | Age: 32
Discharge: HOME | End: 2022-04-24
Attending: FAMILY MEDICINE | Admitting: FAMILY MEDICINE
Payer: COMMERCIAL

## 2022-04-24 VITALS — OXYGEN SATURATION: 100 % | TEMPERATURE: 97.8 F

## 2022-04-24 PROCEDURE — H2035 A/D TX PROGRAM, PER HOUR: HCPCS | Mod: HQ

## 2022-04-24 PROCEDURE — 1002N00001 HC LODGING PLUS FACILITY CHARGE ADULT

## 2022-04-24 NOTE — GROUP NOTE
Psychoeducation Group Documentation    PATIENT'S NAME: Cong Tuttle  MRN:   [id number]  :   1990  ACCT. NUMBER: [id number]  DATE OF SERVICE: 22  START TIME:  9:00 AM  END TIME: 11:00 AM  FACILITATOR(S): Chetna Singh RN; Mor Figueroa LADC; Grace Smalls [facility]  TOPIC: BEH Pyschoeducation  Number of patients attending the group:  26  Group Length:  2 Hours    Skills Group Therapy Type: Healthy behaviors development    Summary of Group / Topics Discussed:    Balanced lifestyle skills          Group Attendance:  Attended group session    Patient's response to the group topic/interactions:  cooperative with task    Patient appeared to be Engaged.         Client specific details:  Pt was attentive and appropriate during RN's Lecture on TB/Hep A, B, &C this AM.

## 2022-04-24 NOTE — GROUP NOTE
Group Therapy Documentation    PATIENT'S NAME: Cong Tuttle  MRN:   [id number]  :   1990  ACCT. NUMBER: [id number]  DATE OF SERVICE: 22  START TIME: 12:30 PM  END TIME:  2:30 PM  FACILITATOR(S): Mor Figueroa LADC; Grace Smalls LADC  TOPIC: BEH Group Therapy  Number of patients attending the group:  26  Group Length:  1 Hours    Group Therapy Type: Recovery strategies    Summary of Group / Topics Discussed:    Relationship/socialization      Group Attendance:  Attended group session    Patient's response to the group topic/interactions:  cooperative with task    Patient appeared to be Engaged.        Client specific details: Pt was engaged and appropriate during  skills lecture on  Critical Thinking in Recovery .

## 2022-04-25 ENCOUNTER — HOSPITAL ENCOUNTER (OUTPATIENT)
Dept: BEHAVIORAL HEALTH | Facility: CLINIC | Age: 32
Discharge: HOME | End: 2022-04-25
Attending: FAMILY MEDICINE | Admitting: FAMILY MEDICINE
Payer: COMMERCIAL

## 2022-04-25 VITALS — OXYGEN SATURATION: 100 % | TEMPERATURE: 97.8 F

## 2022-04-25 PROCEDURE — H2035 A/D TX PROGRAM, PER HOUR: HCPCS | Mod: HQ

## 2022-04-25 PROCEDURE — H2035 A/D TX PROGRAM, PER HOUR: HCPCS

## 2022-04-25 PROCEDURE — 1002N00001 HC LODGING PLUS FACILITY CHARGE ADULT
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
## 2022-04-25 NOTE — GROUP NOTE
Group Therapy Documentation    PATIENT'S NAME: Cong Tuttle  MRN:   [id number]  :   1990  ACCT. NUMBER: [id number]  DATE OF SERVICE: 22  START TIME: 12:30 PM  END TIME:  2:30 PM  FACILITATOR(S): Thomas Villanueva LADC; Marisa Granda  TOPIC: BEH Group Therapy  Number of patients attending the group:  5  Group Length:  2 Hours    Group Therapy Type: Health and wellbeing     Summary of Group / Topics Discussed:    Spiritual Care      Group Attendance:  Attended group session    Patient's response to the group topic/interactions:  cooperative with task    Patient appeared to be Actively participating.        Client specific details:  Cong participated and interacted appropriately with peers and staff in PM group. No triggers to use noted or discussed. Pt attended a one hour psychotherapy session during group.

## 2022-04-25 NOTE — GROUP NOTE
Group Therapy Documentation    PATIENT'S NAME: Cong Tuttle  MRN:   [id number]  :   1990  ACCT. NUMBER: [id number]  DATE OF SERVICE: 22  START TIME:  9:00 AM  END TIME: 11:00 AM  FACILITATOR(S): Thomas Villanueva LADC  TOPIC: BEH Group Therapy  Number of patients attending the group:  5  Group Length:  2 Hours    Group Therapy Type: Recovery strategies    Summary of Group / Topics Discussed:    Recovery Principles      Group Attendance:  Attended group session    Patient's response to the group topic/interactions:  cooperative with task    Patient appeared to be Actively participating.        Client specific details:  Cong participated and interacted appropriately with peers and staff in AM group. No triggers to use noted or discussed.

## 2022-04-25 NOTE — PROGRESS NOTES
"[facility] Adult CD Progress Note and Treatment Plan Review     Patient:  Cong Tuttle  ATTENDANCE for the following date span: 4-18-22 - 4-24-22  Treatment Plan Review competed on: 4/25/2022    Day Mon 4-18 Tues 4-19 Weds 4-20 Thurs 4-21 Friday 4-22 Sat 4-23 Sunday 4-24   Group Hours   3 hours 4 hours  4 hours 4 hours 4 hours  4 hours  1 hours   Skills Hours   0 hours 1 hour 1 hours  1 hour 0 hours 0 hours  2 hours   Individual Session (LADC)   1 hours 0 hours 0 hours 0 hours 1 hours 0 hours 0 hours   Individual Session  (psychotherapy)   1 hours 0  hours 0 hours 0 hours 0 hours 0 hours 0 hours   Peer-led Recovery Group   1 hour 1 hour 1 hour  1 hour 1 hour  1 hour 1 hour     Patient did have absences during this time period (list absence dates and reason for absence). Pt was excused from half of afternoon group on 4-18-22 for an appt.    Weekly Treatment Plan Review     1) Care Coordination Activities: Patient met with LP  and states that he will be talking with his parents to explore any financial assistance they might provide with rent.   Patient indicated an interest in continuum of care options, various programs were reviewed with the patient. Documentation has been forwarded to [facility] for placement consideration.   2) Medical, Mental Health and other appointments the client attended: Pt met with LP LMFT on 4-18-22.  3) Medication issues: none reported  4) Physical and mental health problems: See Dimensions 2 and 3 below.   5) Goals worked on since last review: Pt addressed his goal of developing an awareness of drug use patterns and identifying the consequences of addiction (Dim 4) by reading first 164 pgs of AA Big Book and completing his  Drug Use History  assignment. He addressed his goal of increasing emotional awareness and developing strategies for managing difficult emotions (Dim 5) by completing \"Ending Our Resentments\" and \"Grief and Loss\" packets. He addressed " "his goal of developing insight into the relationship between mental/emotional health and chemical use (Dim 3) by completing  What is the Connection Between Substance Use and Mental Health?  and presenting in group therapy.    Treatment Plan initiated on: 4-14-22  Client preferred learning style: Visual  Hands on  Demonstration  Staff Members contributing: Alexx Villanueva [facility], Kelle Mcgowan [facility], Zhanna Bray [facility]    Dimension1: Acute Intoxication/Withdrawal Potential -   Pt reports date of Last Use as 4-8-22  Any reports of withdrawal symptoms - No    Dimension 2: Biomedical Conditions & Complications -   Pt seems fully functioning and seeks medical attention as needed. Pt's temperature and oxygen level are measured daily in accordance with COVID-19 precautions. He attended lecture given by  nurse on Hepatitis C on 4-24-22.     Dimension 3: Emotional/Behavioral Conditions & Complications -   See below for suicide risk assessment. Pt does not endorse thoughts of self-harm or suicide ideation at this time. Pt's current CGI is 6/5.  He reports that his stress level has increased due to housing and financial issues; coping skills to manage stress used were \"using the elliptical and socializing.\" Pt reported that his mood has not significantly changed this past week.    [place] Suicide Severity Rating Scale (Short Version)  [place] Suicide Severity Rating (Short Version) 4/9/2022 4/10/2022 4/13/2022   Over the past 2 weeks have you felt down, depressed, or hopeless? yes - -   Over the past 2 weeks have you had thoughts of killing yourself? yes - -   Have you ever attempted to kill yourself? yes - -   When did this last happen? more than 6 months ago - -   Comments Oct 2019- cutting - -   Q1 Wished to be Dead (Past Month) yes yes -   Q2 Suicidal Thoughts (Past Month) yes yes -   Comments - passive SI \"don't have the courage to go through with it\" -   Q3 Suicidal Thought Method yes yes -   Comments \"I " "know there is assisted suicide in [place]\" - -   Q4 Suicidal Intent without Specific Plan yes no -   Comments \"yes If I had the money\" - -   Q5 Suicide Intent with Specific Plan no no -   Q6 Suicide Behavior (Lifetime) yes yes -   Comments - cut wrists 2019 and went to the hospital (done after break-up with girlfriend). -   Within the Past 3 Months? - no -   Level of Risk per Screen high risk moderate risk -   Most Lethal Attempt Date - - 39504   Actual Lethality/Medical Damage Code (Most Lethal Attempt) - - 2   Potential Lethality Code (Most Lethal Attempt) - - 1       Dimension 4: Treatment Acceptance / Resistance -   CHRIS Diagnosis: Alcohol Use Disorder   303.90 (F10.20) Severe In a controlled environment.  Stages of Change Model  Contemplation  Pt reports his motivation this week is \"wanting to get better, family, friends, dog, work, housing.\" Pt reports that his aftercare plans include \"living with friends or an 'Airbnb,' outpatient, therapy, psychiatry, and meetings.\" He reports that he has gotten along \"very well\" with peers and staff this week. He engaged in the following recreational activities: using the elliptical and socializing.    Dimension 5: Relapse / Continued Problem Potential -   Relapses this week - None   Pt reports craving 2/10, ten being high. He reported experiencing a trigger to use due to seeing drinking on TV and used the following coping skill(s): exercising and socializing. Pt participated in the spirituality group, facilitated by Marisa Don and  counseling staff was present during group.    Dimension 6: Recovery Environment -   Family Involvement - Pt reports having contact with his mother this past week.  Family supportive of treatment?  Yes  Pt is spending free time with same-gender peers and attending at least 3 virtual 12-step meetings weekly while in . He participated in weekend workshop on relationships and completed all activities.    Dimension Scale Review     Prior " ratings: Dim1 - 0 DIM2 - 0 DIM3 - 2 DIM4 - 3 DIM5 - 4 DIM6 -4     Current ratings: Dim1 - 0 DIM2 - 0 DIM3 - 2 DIM4 - 3 DIM5 - 4 DIM6 -4       Has vulnerable adult status changed? No    Are Treatment Plan goals/objectives effective? Yes  *If no, list changes to treatment plan:    Are the current goals meeting client's needs? Yes  *If no, list the changes to treatment plan.    Client Input / Response: patient contributed to review.    *Client agrees with any changes to the treatment plan: N/A  *Client received copy of changes: N/A  *Client is aware of right to access a treatment plan review: Yes    Data:   offered feedback client did participate    Intervention:   Aftercare planning  Behavior modification  Counselor feedback  Education  Emotional management  Group feedback  Relapse prevention  Twelve Step facilitation  Mental health education  Appears/Sounds:  Cooperative  Ambivalent  Anxious    Plan:  Focus on recovery environment  Monitor emotional/physical health  Continue to address treatment plan goals.    DANNY Aleman

## 2022-04-26 ENCOUNTER — HOSPITAL ENCOUNTER (OUTPATIENT)
Dept: BEHAVIORAL HEALTH | Facility: CLINIC | Age: 32
Discharge: HOME | End: 2022-04-26
Attending: FAMILY MEDICINE | Admitting: FAMILY MEDICINE
Payer: COMMERCIAL

## 2022-04-26 VITALS — OXYGEN SATURATION: 98 % | TEMPERATURE: 97.7 F

## 2022-04-26 PROCEDURE — H2035 A/D TX PROGRAM, PER HOUR: HCPCS | Mod: HQ

## 2022-04-26 PROCEDURE — 1002N00001 HC LODGING PLUS FACILITY CHARGE ADULT

## 2022-04-26 NOTE — GROUP NOTE
Group Therapy Documentation    PATIENT'S NAME: Cong Tuttle  MRN:   [id number]  :   1990  ACCT. NUMBER: [id number]  DATE OF SERVICE: 22  START TIME:  9:00 AM  END TIME: 11:00 AM  FACILITATOR(S): Zhanna Bray LADC  TOPIC: BEH Group Therapy  Number of patients attending the group:  7  Group Length:  2 Hours    Group Therapy Type: Emotion processing and Daily living/independence skills    Summary of Group / Topics Discussed:    Sober coping skills, Relationship/socialization, and Disease of addiction    Group Attendance:  Attended group session    Patient's response to the group topic/interactions:  cooperative with task    Patient appeared to be Actively participating, Attentive and Engaged.        Client specific details: During check-in pt reported feeling hopeful and calm, and shared that he has changed in the following way since his lowest point: he's realized he needs to grow up. He participated in a group discussion of desired treatment outcomes, aftercare plans, identifying techniques that work and those that don't, and basic DBT and CBT info.

## 2022-04-26 NOTE — GROUP NOTE
Psychoeducation Group Documentation    PATIENT'S NAME: Cong Tuttle  MRN:   [id number]  :   1990  ACCT. NUMBER: [id number]  DATE OF SERVICE: 22  START TIME:  3:00 PM  END TIME:  4:00 PM  FACILITATOR(S): Breezy Pandya LADC; Zhanna Bray LADC; Jannet Merlos  TOPIC: BEH Pyschoeducation  Number of patients attending the group:  24  Group Length:  2 Hours    Skills Group Therapy Type: Daily living/independence skills    Summary of Group / Topics Discussed:    Coping/DBT skills    Group Attendance:  Attended group session    Patient's response to the group topic/interactions:  cooperative with task    Patient appeared to be Attentive and Engaged.         Client specific details: Pt listened respectfully to an outline of DBT skills and participated in an activity practicing them.

## 2022-04-26 NOTE — GROUP NOTE
Group Therapy Documentation    PATIENT'S NAME: Cong Tuttle  MRN:   [id number]  :   1990  ACCT. NUMBER: [id number]  DATE OF SERVICE: 22  START TIME: 12:30 PM  END TIME:  2:30 PM  FACILITATOR(S): Thomas Villanueva LADC  TOPIC: BEH Group Therapy  Number of patients attending the group:  7  Group Length:  2 Hours    Group Therapy Type: Emotion processing    Summary of Group / Topics Discussed:    Co-occurring illnesses symptom management      Group Attendance:  Attended group session    Patient's response to the group topic/interactions:  cooperative with task    Patient appeared to be Actively participating.        Client specific details:  Cong participated and interacted appropriately with peers and staff in PM group. No triggers to use noted or discussed.

## 2022-04-27 ENCOUNTER — HOSPITAL ENCOUNTER (OUTPATIENT)
Dept: BEHAVIORAL HEALTH | Facility: CLINIC | Age: 32
Discharge: HOME | End: 2022-04-27
Attending: FAMILY MEDICINE | Admitting: FAMILY MEDICINE
Payer: COMMERCIAL

## 2022-04-27 VITALS — OXYGEN SATURATION: 96 % | TEMPERATURE: 98.1 F

## 2022-04-27 PROCEDURE — H2035 A/D TX PROGRAM, PER HOUR: HCPCS | Mod: HQ

## 2022-04-27 PROCEDURE — 1002N00001 HC LODGING PLUS FACILITY CHARGE ADULT

## 2022-04-27 NOTE — GROUP NOTE
Psychoeducation Group Documentation    PATIENT'S NAME: Cong Tuttle  MRN:   [id number]  :   1990  ACCT. NUMBER: [id number]  DATE OF SERVICE: 22  START TIME:  8:30 AM  END TIME:  9:30 AM  FACILITATOR(S): Reynaldo Al [facility]; Kelle Mcgowan LADC; Luís Linares, PhD LP  TOPIC: BEH Pyschoeducation  Number of patients attending the group:  7  Group Length:  1 Hours    Skills Group Therapy Type: Emotion regulation skills    Summary of Group / Topics Discussed:    Symptom management skills          Group Attendance:  Attended group session    Patient's response to the group topic/interactions:  cooperative with task    Patient appeared to be Actively participating and Attentive.         Client specific details:  Cong gave appropriate feedback..

## 2022-04-27 NOTE — GROUP NOTE
Group Therapy Documentation    PATIENT'S NAME: Cong Tuttle  MRN:   [id number]  :   1990  ACCT. NUMBER: [id number]  DATE OF SERVICE: 22  START TIME: 12:30 PM  END TIME:  2:30 PM  FACILITATOR(S): Thomas Villanueva LADC  TOPIC: BEH Group Therapy  Number of patients attending the group:  7  Group Length:  2 Hours    Group Therapy Type: Recovery strategies    Summary of Group / Topics Discussed:    Disease of addiction      Group Attendance:  Attended group session    Patient's response to the group topic/interactions:  cooperative with task    Patient appeared to be Actively participating.        Client specific details:  Cong participated and interacted appropriately with peers and staff in PM group. No triggers to use noted or discussed.

## 2022-04-27 NOTE — GROUP NOTE
"Group Therapy Documentation    PATIENT'S NAME: Cong Tuttle  MRN:   [id number]  :   1990  ACCT. NUMBER: [id number]  DATE OF SERVICE: 22  START TIME:  9:40 AM  END TIME: 11:30 AM  FACILITATOR(S): Kelle Mcgowan LADC  TOPIC: BEH Group Therapy  Number of patients attending the group: 7  Group Length:  2 Hours    Group Therapy Type: Recovery strategies    Summary of Group / Topics Discussed:    Recovery Principles      Group Attendance:  Attended group session    Patient's response to the group topic/interactions:  cooperative with task    Patient appeared to be Actively participating.        Client specific details:  Cong attended AM small group. Patient participated in daily check ins and the question of the day, \" what have you done in the last week to improve your quality of life?\" Patient shared his drug history assignment. Patient shared about his history with drugs, self harm, eating disorder behavior. Patient shared about residual feelings from his last relationship.     "

## 2022-04-28 ENCOUNTER — OFFICE VISIT (OUTPATIENT)
Dept: ADDICTION MEDICINE | Facility: CLINIC | Age: 32
End: 2022-04-28
Payer: COMMERCIAL

## 2022-04-28 ENCOUNTER — HOSPITAL ENCOUNTER (OUTPATIENT)
Dept: BEHAVIORAL HEALTH | Facility: CLINIC | Age: 32
Discharge: HOME | End: 2022-04-28
Attending: FAMILY MEDICINE | Admitting: FAMILY MEDICINE
Payer: COMMERCIAL

## 2022-04-28 VITALS
BODY MASS INDEX: 24.96 KG/M2 | DIASTOLIC BLOOD PRESSURE: 68 MMHG | WEIGHT: 169 LBS | HEART RATE: 48 BPM | SYSTOLIC BLOOD PRESSURE: 113 MMHG

## 2022-04-28 VITALS — OXYGEN SATURATION: 100 % | TEMPERATURE: 97.6 F

## 2022-04-28 DIAGNOSIS — F33.41 MAJOR DEPRESSIVE DISORDER, RECURRENT EPISODE, IN PARTIAL REMISSION (H): ICD-10-CM

## 2022-04-28 DIAGNOSIS — F10.20 SEVERE ALCOHOL USE DISORDER (H): Primary | ICD-10-CM

## 2022-04-28 PROCEDURE — 1002N00001 HC LODGING PLUS FACILITY CHARGE ADULT

## 2022-04-28 PROCEDURE — H2035 A/D TX PROGRAM, PER HOUR: HCPCS | Mod: HQ

## 2022-04-28 PROCEDURE — 99204 OFFICE O/P NEW MOD 45 MIN: CPT | Mod: 25 | Performed by: FAMILY MEDICINE

## 2022-04-28 NOTE — ADDENDUM NOTE
Encounter addended by: Bert Holden LADC on: 4/28/2022 9:53 AM   Actions taken: Charge Capture section accepted

## 2022-04-28 NOTE — GROUP NOTE
Group Therapy Documentation    PATIENT'S NAME: Cong Tuttle  MRN:   [id number]  :   1990  ACCT. NUMBER: [id number]  DATE OF SERVICE: 22  START TIME: 12:30 PM  END TIME:  2:30 PM  FACILITATOR(S): Thomas Villanueva LADC  TOPIC: BEH Group Therapy  Number of patients attending the group:  8  Group Length:  2 Hours    Group Therapy Type: Recovery strategies    Summary of Group / Topics Discussed:    Relapse prevention      Group Attendance:  Attended group session    Patient's response to the group topic/interactions:  cooperative with task    Patient appeared to be Actively participating.        Client specific details:  Cong participated and interacted appropriately with peers and staff in PM group. No triggers to use noted or discussed.

## 2022-04-28 NOTE — GROUP NOTE
Group Therapy Documentation    PATIENT'S NAME: Cong Tuttle  MRN:   [id number]  :   1990  ACCT. NUMBER: [id number]  DATE OF SERVICE: 22  START TIME:  9:00 AM  END TIME: 11:00 AM  FACILITATOR(S): Kelle Mcgowan LADC  TOPIC: BEH Group Therapy  Number of patients attending the group:  8  Group Length:  2 Hours    Group Therapy Type: Recovery strategies    Summary of Group / Topics Discussed:    Recovery Principles      Group Attendance:  Excused from group session for Medication Appt with Dr. Sparks.     Patient's response to the group topic/interactions:  cooperative with task    Patient appeared to be Actively participating.        Client specific details:  Cong did not attend group, excused for a doctor appt.

## 2022-04-28 NOTE — GROUP NOTE
Psychoeducation Group Documentation    PATIENT'S NAME: oCng Tuttle  MRN:   [id number]  :   1990  ACCT. NUMBER: [id number]  DATE OF SERVICE: 22  START TIME:  3:00 PM  END TIME:  4:00 PM  FACILITATOR(S): Breezy Pandya [facility]; Zhanna Bray LADC; [facility] Holden [facility]  TOPIC: BEH Pyschoeducation  Number of patients attending the group:  31  Group Length:  1 Hours    Skills Group Therapy Type: Recovery skills    Summary of Group / Topics Discussed:    Mindfulness/Relaxation skills and Coping/DBT skills          Group Attendance:  Attended group session    Patient's response to the group topic/interactions:  cooperative with task and listened actively    Patient appeared to be Actively participating, Attentive and Engaged.         Client specific details:  .

## 2022-04-28 NOTE — PROGRESS NOTES
SUBJECTIVE                                                      Cong Tuttle is a 31 year old male who presents for  initial visit for addiction consultation and management referred by [facility] due to concerns for Alcohol Use Disorder (AUD) and Sedative Use Disorder    Visit performed In Person, face-to-face    HPI:   Cong Tuttle is a 31 year old male with history of alcohol and benzodiazepine use who presents for further evaluation of possible substance use disorder and management options.    - When he starts drinking, he only stops when he blacks out of passes out  - Started drinking age 16  - Had alcohol use after college resulting in consequences, including loss of jobs, relationship concerns, DUI  - No negative health consequences other than withdrawals that started to occur over the last 6 months. No seizures or other physiologic consequences  - Around age 25 his current pattern of use emerged, with blackouts/LOC  - Stopped drinking after overdose in 2016 with ativan + everclear  - [id number] didn't drink, but was using prescribed benzodiazepines (overuse)  - Stopped BZD in 2021, then was abstinent from alcohol for 9 months. Working and running were most helpful; attended AA occasionally. Stayed connected with friends during that time  - Had surgery in Jan 2022 for pilonidal cyst. Drank a few days after this but did not return to heavy use. In March he began drinking more heavily and developed suicidal ideation in early April. Was not connecting with friends much and did not return to running d/t his cyst      Substance Use History:     Longest period of sobriety; protective factors? Abstinent from all substances for 9 months 2021, after stopping BZD. Running consistently, which was helpful   Previous withdrawal treatment episodes Once, as above (2016)   Previous [facility] treatment programs 2x residential (currently at ), 5 total   Medical Complications, Overdose Hx Withdrawals but no seizures. Overdose  with ativan + everclear in 2016, resulting in hospitalization   IV Drug Use Hx none   Previous Medication for Addiction Tx Tried naltrexone/Vivitrol, as well as antabuse (drank on this and didn't get sick)   Current Recovery Activities Some friends are in recovery, and he attends AA on occasion. Has tried [facility] and may want to try this again       Other Substances:    ALCOHOL - Abstinent [id number]  CANNABIS - last use 2020; experienced cannabis use disorder (not diagnosed) and plans to stop indefinitely  PRESCRIPTION STIMULANTS - used adderall in the past, last in 2020; no cravings or intent to use again. Used for about a year. Quit without concern  COCAINE/CRACK - cocaine last used 2020; history of recurrent use (not daily, usually on weekends) for about 6 months in 2020. Quit without concern  METH/AMPHETAMINES - used ecstacy once  OPIATES - used vicodin and oxycodone, not prescribed. Used over the course of a month in 2011. Has been prescribed them in the past after surgery (most recently January 2022) and overused but did not return for future prescriptions. Reports no urge or craving to use these unless they are prescribed, but does enjoy them  BENZODIAZEPINES - tapered off BZD April 2021, over the course of 8 months. History of use for 10 years, prescribed, but misused throughout this time (ran out early, would overuse then use less for a period of time). Ativan (3mg), clonazepam (5mg), and ambien (10mg) throughout most of the 10 years. Suffered twitches during his withdrawal period. No significant symptoms since then  KRATOM - none  KETAMINE - never, but interested in this for depression  HALLUCINOGENS (including DXM) - tried DXM for a couple months, daily use, and had a dissociative experience that was unsettling. Stopped shortly after; this occurred 2017  OTHER - none    NICOTINE- occasional; started again during LP   Desire to quit - eventually          Hx of medication for smoking cessation -  "using NRT occasionally    Infectious disease screening  Lab Results   Component Value Date    Hepatitis C Antibody Negative 10/03/2019     HIV Antigen Antibody Combo   Date Value Ref Range Status   10/03/2019 Negative Negative Final       Psychiatric History  Past diagnoses - MDD, JOSSIE. History of BPD, but denies any ongoing concerns. Has been told about a diagnosis of PTSD but doesn't believe this applies to him  Current or past psychiatrist: yes; established with psychiatry July 2021 but didn't continue  Current or past therapist:  Yes, most recently in February 2022. Attended [facility] [id number] and enjoyed this immensely.  Hospitalizations/commitment - yes, April 2022. About \"a dozen\" for suicidal ideation, \"always\" in the context of alcohol use  Suicide Attempts - \"I don't know\"  Psychotherapy/ECT/TMS - none  Medication trials - previously on effexor; currently taking lexapro. Was taking effexor 6-8 months while abstinent and stopped, and didn't see any change. Also took prozac and zoloft, and these didn't seem to make any significant difference either. Has tried hydroxyzine and gabapentin without much relief. Trazodone + melatonin seems to help with sleep      PHQ-9 scores:  PHQ-9 SCORE 10/3/2019 4/13/2022   PHQ-9 Total Score 16 3     JOSSIE-7 scores:  JOSSIE-7 SCORE 4/13/2022   Total Score 0         SOCIAL HISTORY:  Living situation:  currently staying at ; moving into an [facility] after completing programming   Relationship status Single   Children none   Support system: Parents, \"probably 10 friends\" that are supportive and can be called   Employment: Employed part time in retail    Barriers to Care (transportation, childcare, etc): none   Legal concerns: none   Insurance needs: none   Consent to Communicate? n/a       Medical History:    Patient Active Problem List    Diagnosis Date Noted     Chemical dependency (H) 04/13/2022     Priority: Medium     Alcohol abuse 04/10/2022     Priority: Medium     Borderline " personality disorder (H) 10/03/2019     Priority: Medium     Atypical eating disorder 07/27/2019     Priority: Medium     Recurrent major depression in partial remission (H) 04/23/2019     Priority: Medium     Alcohol use disorder, severe, in sustained remission, dependence (H) 10/31/2018     Priority: Medium     Cannabis use disorder, severe, in sustained remission (H) 10/31/2018     Priority: Medium     Generalized anxiety disorder 10/03/2018     Priority: Medium     Moderate episode of recurrent major depressive disorder (H) 10/03/2018     Priority: Medium       Past Medical History:   Diagnosis Date     Anxiety      Borderline personality disorder (H)      Depression        History reviewed. No pertinent surgical history.      Family History   Problem Relation Age of Onset     Depression Mother      Anxiety Disorder Mother      Hypertension Father      Bipolar Disorder Brother        Current Outpatient Medications   Medication Sig Dispense Refill     acetaminophen (TYLENOL) 325 MG tablet Take 325-650 mg by mouth every 4 hours as needed for mild pain       alum & mag hydroxide-simethicone (MAALOX) 200-200-20 MG/5ML SUSP suspension Take 30 mLs by mouth every 6 hours as needed for indigestion       benzocaine-menthol (CEPACOL) 15-3.6 MG lozenge Place 1 lozenge inside cheek every 2 hours as needed for moderate pain       escitalopram (LEXAPRO) 20 MG tablet Take 1 tablet (20 mg) by mouth in the morning. 30 tablet 0     folic acid (FOLVITE) 1 MG tablet Take 1 tablet (1 mg) by mouth in the morning. 30 tablet 0     gabapentin (NEURONTIN) 100 MG capsule Take 1 capsule (100 mg) by mouth 3 times daily as needed (anxiety) 30 capsule 1     guaiFENesin (ROBITUSSIN) 20 mg/mL SOLN solution Take 10 mLs by mouth every 4 hours as needed for cough       ibuprofen (ADVIL/MOTRIN) 200 MG tablet Take 400 mg by mouth every 6 hours as needed for mild pain       loratadine (CLARITIN) 10 MG tablet Take 10 mg by mouth as needed for  allergies       magnesium citrate solution Take 296 mLs by mouth once       melatonin 3 MG tablet Take 1 tablet (3 mg) by mouth every evening 30 tablet 0     multivitamin w/minerals (THERA-VIT-M) tablet Take 1 tablet by mouth in the morning. 30 tablet 0     nicotine polacrilex (NICORETTE) 4 MG gum Place 1 each (4 mg) inside cheek every hour as needed for other (nicotine withdrawal symptoms) 30 each 0     prazosin (MINIPRESS) 1 MG capsule Take 1 capsule (1 mg) by mouth At Bedtime 30 capsule 0     senna-docusate (SENOKOT-S/PERICOLACE) 8.6-50 MG tablet Take 2 tablets by mouth daily as needed for constipation       thiamine (B-1) 100 MG tablet Take 1 tablet (100 mg) by mouth in the morning. 30 tablet 0     traZODone (DESYREL) 50 MG tablet Take 1 tablet (50 mg) by mouth nightly as needed for sleep (may repeat after 60 minutes) 30 tablet 0         No Known Allergies        OBJECTIVE                                                      EXAM    /68   Pulse (!) 48   Wt 76.7 kg (169 lb)   BMI 24.96 kg/m      GENERAL: healthy, alert and no distress  EYES: Eyes grossly normal to inspection, PERRL and conjunctivae and sclerae normal  RESP: No respiratory distress  MENTAL STATUS EXAM  Appearance/Behavior: No appearant distress  Speech: Normal  Mood/Affect: normal affect  Insight: Adequate      LAB  No results found for any visits on 04/28/22.  AST   Date Value Ref Range Status   04/10/2022 33 0 - 45 U/L Final     ALT   Date Value Ref Range Status   04/10/2022 41 0 - 70 U/L Final             [place] Board of Pharmacy Data Base Reviewed;    Consistent with patient reports and Epic records.           A/P                                                      ASSESSMENT/PLAN:  Cong was seen today for addiction problem.    Diagnoses and all orders for this visit:    Severe alcohol use disorder (H)  -     Adult Mental Health  Referral; Future  -     MED THERAPY MANAGE REFERRAL  -     [facility] (You MUST fill out  "the order form (see link in reference section below) and fax the completed form to [facility] in order for the test to be completed.); Future    Major depressive disorder, recurrent episode, in partial remission (H)  -     Adult Mental Health [facility] Referral; Future  -     MED THERAPY MANAGE REFERRAL  -     [facility] (You MUST fill out the order form (see link in reference section below) and fax the completed form to [facility] in order for the test to be completed.); Future            - Diagnosis of recurrent major depressive disorder, but doesn't believe medications have been effective to improve symptoms. Advised he continue with current regimen, and will refer to [facility] clinic  - No cravings currently, and not curerntly interested in anti-craving medication, but we can re-address in the future potentially  - Mood doing well overall. No SE from medications, but doesn't think his current MH medications are helping much  - Will obtain [facility] testing today to discuss possible future options  - History of eating disorder concerns, but currently asymptomatic and feels \"it's under control right now\"           Counseled the patient on the importance of having a recovery program in addition to medication to manage recovery.  Components include avoiding isolating, having willingness to change, avoiding triggers and managing cravings. Encouraged having some type of sober network and practicing honesty with trusted support person(s). Encouraged other services such as counseling, 12 step or other self-help organizations.        RTC   4 weeks      Umberto Sparks MD  [facility]  [phone]                    "

## 2022-04-29 ENCOUNTER — HOSPITAL ENCOUNTER (OUTPATIENT)
Dept: BEHAVIORAL HEALTH | Facility: CLINIC | Age: 32
Discharge: HOME | End: 2022-04-29
Attending: FAMILY MEDICINE | Admitting: FAMILY MEDICINE
Payer: COMMERCIAL

## 2022-04-29 PROCEDURE — H2035 A/D TX PROGRAM, PER HOUR: HCPCS | Mod: HQ

## 2022-04-29 PROCEDURE — 1002N00001 HC LODGING PLUS FACILITY CHARGE ADULT

## 2022-04-29 NOTE — PROGRESS NOTES
Patient reports that he no longer requires sober housing. However, patient continues to indicate  a desire to participate in outpatient programming. Patient and writer will continue to assess available options.

## 2022-04-29 NOTE — GROUP NOTE
"Group Therapy Documentation    PATIENT'S NAME: Cong Tuttle  MRN:   [id number]  :   1990  ACCT. NUMBER: [id number]  DATE OF SERVICE: 22  START TIME: 12:30 PM  END TIME:  2:30 PM  FACILITATOR(S): Kelle Mcgowan LADC  TOPIC: BEH Group Therapy  Number of patients attending the group:  8  Group Length:  2 Hours    Group Therapy Type: Recovery strategies    Summary of Group / Topics Discussed:    Patients watched film \"Smoke Signals\" and discussed with staff and peers. The film addressed addiction, family and alcoholism, relationships, understanding trauma, forgiveness, and anger.       Group Attendance:  Attended group session    Patient's response to the group topic/interactions:  cooperative with task    Patient appeared to be Actively participating.        Client specific details:  Cong attended and participated in group. No other concerns reported.       "

## 2022-04-29 NOTE — GROUP NOTE
Group Therapy Documentation    PATIENT'S NAME: Cong Tuttle  MRN:   [id number]  :   1990  ACCT. NUMBER: [id number]  DATE OF SERVICE: 22  START TIME:  9:00 AM  END TIME: 11:00 AM  FACILITATOR(S): Thomas Villanueva LADC  TOPIC: BEH Group Therapy  Number of patients attending the group:  8  Group Length:  2 Hours    Group Therapy Type: Recovery strategies    Summary of Group / Topics Discussed:    Disease of addiction      Group Attendance:  Attended group session    Patient's response to the group topic/interactions:  cooperative with task    Patient appeared to be Actively participating.        Client specific details:  Cong participated and interacted appropriately with peers and staff in AM group. No triggers to use noted or discussed.

## 2022-04-30 ENCOUNTER — HOSPITAL ENCOUNTER (OUTPATIENT)
Dept: BEHAVIORAL HEALTH | Facility: CLINIC | Age: 32
Discharge: HOME | End: 2022-04-30
Attending: FAMILY MEDICINE | Admitting: FAMILY MEDICINE
Payer: COMMERCIAL

## 2022-04-30 PROCEDURE — H2035 A/D TX PROGRAM, PER HOUR: HCPCS | Mod: HQ

## 2022-04-30 PROCEDURE — 1002N00001 HC LODGING PLUS FACILITY CHARGE ADULT

## 2022-04-30 NOTE — GROUP NOTE
"Group Therapy Documentation    PATIENT'S NAME: Cong Tuttle  MRN:   [id number]  :   1990  ACCT. NUMBER: [id number]  DATE OF SERVICE: 22  START TIME: 12:30 AM  END TIME:  2:30 PM  FACILITATOR(S): Jannet Merlos; Irvin Linares LADC  TOPIC: BEH Group Therapy  Number of patients attending the group:  31  Group Length:  2 Hours    Group Therapy Type: Emotion processing and Daily living/independence skills    Summary of Group / Topics Discussed:    Relationship/socialization and Voices of Addiction      Group Attendance:  Attended group session    Patient's response to the group topic/interactions:  cooperative with task and listened actively    Patient appeared to be Actively participating, Attentive and Engaged.        Client specific details:  Pt was pleasant and respectful throughout the lecture. Pt completed and presented the \"voice's of addiction\" assignment in front of co-ed group.         "

## 2022-04-30 NOTE — GROUP NOTE
Group Therapy Documentation    PATIENT'S NAME: Cong Tuttle  MRN:   [id number]  :   1990  ACCT. NUMBER: [id number]  DATE OF SERVICE: 22  START TIME:  9:30 AM  END TIME: 11:30 AM  FACILITATOR(S): Neel Carmona LADC; Irvin Linares LADC  TOPIC: BEH Group Therapy  Number of patients attending the group: 30  Group Length:  2 Hours    Group Therapy Type: Recovery strategies and Relapse Prevention Strategies    Summary of Group / Topics Discussed:    Relapse prevention      Group Attendance:  Attended group session    Patient's response to the group topic/interactions:  cooperative with task and listened actively    Patient appeared to be Attentive.        Client specific details:   Pt was respectful and pleasant during lecture on Relapse Prevention. Completed worksheet/handout and presented results to the group.

## 2022-05-01 ENCOUNTER — HOSPITAL ENCOUNTER (OUTPATIENT)
Dept: BEHAVIORAL HEALTH | Facility: CLINIC | Age: 32
Discharge: HOME | End: 2022-05-01
Attending: FAMILY MEDICINE | Admitting: FAMILY MEDICINE
Payer: COMMERCIAL

## 2022-05-01 PROCEDURE — H2035 A/D TX PROGRAM, PER HOUR: HCPCS | Mod: HQ

## 2022-05-01 PROCEDURE — 1002N00001 HC LODGING PLUS FACILITY CHARGE ADULT

## 2022-05-01 NOTE — GROUP NOTE
"Group Therapy Documentation    PATIENT'S NAME: Cong Tuttle  MRN:   [id number]  :   1990  ACCT. NUMBER: [id number]  DATE OF SERVICE: 22  START TIME: 12:30 PM  END TIME:  1:30 PM  FACILITATOR(S): Jannet Merlos  TOPIC: BEH Group Therapy  Number of patients attending the group:  30    Group Length:  1 Hour    Group Therapy Type: Emotion processing    Summary of Group / Topics Discussed:    Recovery Principles, Sober coping skills, Relationship/socialization, and Relapse prevention      Group Attendance:  Attended group session    Patient's response to the group topic/interactions:  cooperative with task    Patient appeared to be Actively participating, Attentive and Engaged.        Client specific details:  Patient attended an \"Emotional Processing\" workshop and was attentive and participative.      "

## 2022-05-02 ENCOUNTER — HOSPITAL ENCOUNTER (OUTPATIENT)
Dept: BEHAVIORAL HEALTH | Facility: CLINIC | Age: 32
Discharge: HOME | End: 2022-05-02
Attending: FAMILY MEDICINE | Admitting: FAMILY MEDICINE
Payer: COMMERCIAL

## 2022-05-02 PROCEDURE — 1002N00001 HC LODGING PLUS FACILITY CHARGE ADULT

## 2022-05-02 PROCEDURE — H2035 A/D TX PROGRAM, PER HOUR: HCPCS

## 2022-05-02 PROCEDURE — H2035 A/D TX PROGRAM, PER HOUR: HCPCS | Mod: HQ

## 2022-05-02 NOTE — GROUP NOTE
Group Therapy Documentation    PATIENT'S NAME: Cong Tuttle  MRN:   [id number]  :   1990  ACCT. NUMBER: [id number]  DATE OF SERVICE: 22  START TIME: 12:30 PM  END TIME:  2:30 PM  FACILITATOR(S): Ra Brannon LADC  TOPIC: BEH Group Therapy  Number of patients attending the group:  7  Group Length:  2 Hours    Group Therapy Type: Recovery strategies    Summary of Group / Topics Discussed:    Spiritual Care      Group Attendance:  Attended group session    Patient's response to the group topic/interactions:  cooperative with task    Patient appeared to be Actively participating.        Client specific details:  Patient attended only second hour of group due to appointment with therapist. Was attentive during discussions.      
98.6

## 2022-05-02 NOTE — PROGRESS NOTES
INDIVIDUAL SESSION SUMMARY    D) Met with client on 5/2/22 from 12:30-1:30pm. Client is in the [facility]. Client spoke on fears/worries he has about leaving this program and navigating life stressors. Client reported feeling supported in this program. Client shared that he has located a new AirBnB to rent and is hoping to start the [facility] EOP group. Client reported that he would like a therapist referral and has mixed feelings about returning to his prior therapist whose focus is on eating disorders.     I) Individual session with client. Provided client with verbal interventions including: validation, nurturing, compassion and support. Discussed coping skills to increase confidence and calm. Spoke about trauma therapy and offered to provide referrals.     A) Client appears to have struggles with low-self-esteem and tends to isolate. Client appears to understand the importance of a sober support network and yet struggles to prioritize activities that will support his recovery. Client appears to have motivation to live independently and sober. Client would benefit from continued support with emotional regulation, shame/guilt, relapse prevention and self-esteem.     P) Next session is scheduled for 5/9/22.   AIRAM uDdley  5/2/2022

## 2022-05-03 ENCOUNTER — HOSPITAL ENCOUNTER (OUTPATIENT)
Dept: BEHAVIORAL HEALTH | Facility: CLINIC | Age: 32
Discharge: HOME | End: 2022-05-03
Attending: FAMILY MEDICINE | Admitting: FAMILY MEDICINE
Payer: COMMERCIAL

## 2022-05-03 PROCEDURE — H2035 A/D TX PROGRAM, PER HOUR: HCPCS | Mod: HQ

## 2022-05-03 PROCEDURE — 1002N00001 HC LODGING PLUS FACILITY CHARGE ADULT

## 2022-05-03 ASSESSMENT — COLUMBIA-SUICIDE SEVERITY RATING SCALE - C-SSRS
6. HAVE YOU EVER DONE ANYTHING, STARTED TO DO ANYTHING, OR PREPARED TO DO ANYTHING TO END YOUR LIFE?: NO
MOST LETHAL DATE: 66051
2. HAVE YOU ACTUALLY HAD ANY THOUGHTS OF KILLING YOURSELF?: NO
TOTAL  NUMBER OF INTERRUPTED ATTEMPTS SINCE LAST CONTACT: NO
SUICIDE, SINCE LAST CONTACT: NO
1. SINCE LAST CONTACT, HAVE YOU WISHED YOU WERE DEAD OR WISHED YOU COULD GO TO SLEEP AND NOT WAKE UP?: NO
TOTAL  NUMBER OF ABORTED OR SELF INTERRUPTED ATTEMPTS SINCE LAST CONTACT: NO
ATTEMPT SINCE LAST CONTACT: NO
LETHALITY/MEDICAL DAMAGE CODE MOST LETHAL POTENTIAL ATTEMPT: BEHAVIOR LIKELY TO RESULT IN INJURY BUT NOT LIKELY TO CAUSE DEATH
LETHALITY/MEDICAL DAMAGE CODE MOST LETHAL ACTUAL ATTEMPT: MODERATE PHYSICAL DAMAGE, MEDICAL ATTENTION NEEDED

## 2022-05-03 NOTE — PROGRESS NOTES
[facility] Adult CD Progress Note and Treatment Plan Review     Patient:  Cong Tuttle  ATTENDANCE for the following date span: 4/25/22-5/1/22  Treatment Plan Review competed on: 5/2/22    Day Mon 4-25 Tues 4-26 Weds 4-27 Thurs 4-28 Friday 4-29 Sat 4-30 Sunday 5-1   Group Hours   4 hours 4 hours  4 hours 4 hours 4 hours  4 hours  1 hours   Skills Hours   0 hours 1 hour 1 hours  1 hour 0 hours 0 hours  2 hours   Individual Session (LADC)   1 hours 0 hours 0 hours 0 hours 1 hours 0 hours 0 hours   Individual Session  (psychotherapy)   1 hours 0  hours 0 hours 0 hours 0 hours 0 hours 0 hours   Peer-led Recovery Group   1 hour 1 hour 1 hour  1 hour 1 hour  1 hour 1 hour         Weekly Treatment Plan Review     1) Care Coordination Activities: Patient met with [facility]  and states that he will be talking with his parents to explore any financial assistance they might provide with rent.   Patient indicated an interest in continuum of care options, various programs were reviewed with the patient. Documentation has been forwarded to [facility] for placement consideration.   2) Medical, Mental Health and other appointments the client attended: None  3) Medication issues: none reported   4) Physical and mental health problems: See Dimensions 2 and 3 below.   5) Goals worked on since last review: Pt reported goal of continuing to work on assignments. Patient shared wanting to continue to gain skills, prevention, DBT, and overall life skill.     Treatment Plan initiated on: 4-14-22  Client preferred learning style: Visual  Hands on  Demonstration  Staff Members contributing: VALENCIA Mccall, VALENCIA Lay, VALENCIA Aleman    Dimension1: Acute Intoxication/Withdrawal Potential -   Pt reports date of Last Use as 4-8-22  Any reports of withdrawal symptoms - No    Dimension 2: Biomedical Conditions & Complications -   Pt seems fully functioning and seeks medical attention as  "needed. Pt's temperature and oxygen level are measured daily in accordance with COVID-19 precautions. He attended nursing lecture on Sunday over the weekend.     Dimension 3: Emotional/Behavioral Conditions & Complications -   See below for suicide risk assessment. Pt does not endorse thoughts of self-harm or suicide ideation at this time.   Patient reported his stress level has maintained due to the stressors not changing-financial issues; coping skills to manage stress used were \"using DBT skills.\" Pt reported that his mood has not significantly changed this past week. Patient reported he continues to engage with his peers and this has been beneficial to keep his mind of things, and share about himself.     [place] Suicide Severity Rating Scale (Short Version)  [place] Suicide Severity Rating (Short Version) 4/9/2022 4/10/2022 4/13/2022 5/3/2022   Over the past 2 weeks have you felt down, depressed, or hopeless? yes - - -   Over the past 2 weeks have you had thoughts of killing yourself? yes - - -   Have you ever attempted to kill yourself? yes - - -   When did this last happen? more than 6 months ago - - -   Comments Oct 2019- cutting - - -   Q1 Wished to be Dead (Past Month) yes yes - -   Q2 Suicidal Thoughts (Past Month) yes yes - -   Comments - passive SI \"don't have the courage to go through with it\" - -   Q3 Suicidal Thought Method yes yes - -   Comments \"I know there is assisted suicide in [place]\" - - -   Q4 Suicidal Intent without Specific Plan yes no - -   Comments \"yes If I had the money\" - - -   Q5 Suicide Intent with Specific Plan no no - -   Q6 Suicide Behavior (Lifetime) yes yes - -   Comments - cut wrists 2019 and went to the hospital (done after break-up with girlfriend). - -   Within the Past 3 Months? - no - -   Level of Risk per Screen high risk moderate risk - -   1. Wish to be Dead (Since Last Contact) - - - 0   2. Non-Specific Active Suicidal Thoughts (Since Last Contact) - - - 0   Actual " "Attempt (Since Last Contact) - - - 0   Has subject engaged in non-suicidal self-injurious behavior? (Since Last Contact) - - - 0   Interrupted Attempts (Since Last Contact) - - - 0   Aborted or Self-Interrupted Attempt (Since Last Contact) - - - 0   Preparatory Acts or Behavior (Since Last Contact) - - - 0   Suicide (Since Last Contact) - - - 0   Most Lethal Attempt Date - - [phone]   Actual Lethality/Medical Damage Code (Most Lethal Attempt) - - 2 2   Potential Lethality Code (Most Lethal Attempt) - - 1 1   Calculated C-SSRS Risk Score (Since Last Contact) - - - No Risk Indicated       Dimension 4: Treatment Acceptance / Resistance -   CHRIS Diagnosis: Alcohol Use Disorder   303.90 (F10.20) Severe In a controlled environment.  Stages of Change Model  Contemplation  **Risk rating decrease due to consistent engagement and willingess to explore consequences and changes in behavior.   Pt reports his motivation this week is \"family, work, and wanting a better/easier life.\" Pt reports that his aftercare plans include \" considering an AirBnB, and completing  outpatient, therapy, psychiatry, and meetings.\" He reports that he is getting along \"very well\" with peers and staff this week. He engaged in the following recreational activities: exercise and socializing.    Dimension 5: Relapse / Continued Problem Potential -   Relapses this week - None   Pt reports craving 2/10, ten being high. He reported experiencing a trigger to use due to seeing drinking on TV and used the following coping skill(s): using DBT skills and connecting with peers. . Pt participated in the spirituality group, facilitated by Marisa Don and  counseling staff was present during group.    Dimension 6: Recovery Environment -   Family Involvement - Pt reports having contact with his mother this past week.  Family supportive of treatment?  Yes  Pt is spending free time with same-gender peers and attending at least 3 virtual 12-step meetings weekly " while in [facility]. He participated in weekend workshop on relationships and completed all activities.    Dimension Scale Review     Prior ratings: Dim1 - 0 DIM2 - 0 DIM3 - 2 DIM4 - 3 DIM5 - 4 DIM6 -4     Current ratings: Dim1 - 0 DIM2 - 0 DIM3 - 2 DIM4 - 2 DIM5 - 4 DIM6 -4       Has vulnerable adult status changed? No    Are Treatment Plan goals/objectives effective? Yes  *If no, list changes to treatment plan:    Are the current goals meeting client's needs? Yes  *If no, list the changes to treatment plan.    Client Input / Response: patient contributed to review.    *Client agrees with any changes to the treatment plan: N/A  *Client received copy of changes: N/A  *Client is aware of right to access a treatment plan review: Yes    Data:   offered feedback client did participate    Intervention:   Aftercare planning  Behavior modification  Counselor feedback  Education  Emotional management  Group feedback  Relapse prevention  Twelve Step facilitation  Mental health education  Appears/Sounds:  Cooperative  Ambivalent  Anxious    Plan:  Focus on recovery environment  Monitor emotional/physical health  Continue to address treatment plan goals.      VALENCIA Lay

## 2022-05-03 NOTE — GROUP NOTE
Group Therapy Documentation    PATIENT'S NAME: Cong Tuttle  MRN:   [id number]  :   1990  ACCT. NUMBER: [id number]  DATE OF SERVICE: 22  START TIME: 12:30 PM  END TIME:  2:30 PM  FACILITATOR(S): Thomas Vlilanueva LADC  TOPIC: BEH Group Therapy  Number of patients attending the group:  7  Group Length:  2 Hours    Group Therapy Type: Recovery strategies    Summary of Group / Topics Discussed:    Disease of addiction      Group Attendance:  Attended group session    Patient's response to the group topic/interactions:  cooperative with task    Patient appeared to be Actively participating.        Client specific details:  Cong participated and interacted appropriately with peers and staff in PM group. No triggers to use noted or discussed.

## 2022-05-03 NOTE — GROUP NOTE
Group Therapy Documentation    PATIENT'S NAME: Cong Tuttle  MRN:   [id number]  :   1990  ACCT. NUMBER: [id number]  DATE OF SERVICE: 22  START TIME:  9:00 AM  END TIME: 11:00 AM  FACILITATOR(S): Kelle Mcgowan LADC  TOPIC: BEH Group Therapy  Number of patients attending the group:  7  Group Length:  2 Hours    Group Therapy Type: Recovery strategies    Summary of Group / Topics Discussed:    Recovery Principles      Group Attendance:  Attended group session    Patient's response to the group topic/interactions:  cooperative with task    Patient appeared to be Actively participating.        Client specific details:  Cong attended AM small group. Patient engaged in daily check ins and the question of the day. Patient engaged in discussions about willingness to engage in treatment, letting past behaviors and connections go that create destruction. No other concerns reported.

## 2022-05-03 NOTE — GROUP NOTE
Psychoeducation Group Documentation    PATIENT'S NAME: Cong Tuttle  MRN:   [id number]  :   1990  ACCT. NUMBER: [id number]  DATE OF SERVICE: 22  START TIME:  3:00 PM  END TIME:  4:00 PM  FACILITATOR(S): Jannet Merlos; Breezy Pandya LADC; Zhanna rBay LADC  TOPIC: BEH Pyschoeducation  Number of patients attending the group:  27  Group Length:  1 Hours    Skills Group Therapy Type: Healthy behaviors development    Summary of Group / Topics Discussed:    Symptom management skills and Relapse prevention skills    Group Attendance:  Attended group session    Patient's response to the group topic/interactions:  cooperative with task    Patient appeared to be Attentive and Engaged.         Client specific details: Pt listened respectfully to Dr. Baez's lecture on advances in addiction treatment and research outcomes.

## 2022-05-04 ENCOUNTER — HOSPITAL ENCOUNTER (OUTPATIENT)
Dept: BEHAVIORAL HEALTH | Facility: CLINIC | Age: 32
Discharge: HOME | End: 2022-05-04
Attending: FAMILY MEDICINE | Admitting: FAMILY MEDICINE
Payer: COMMERCIAL

## 2022-05-04 PROCEDURE — 1002N00001 HC LODGING PLUS FACILITY CHARGE ADULT

## 2022-05-04 PROCEDURE — H2035 A/D TX PROGRAM, PER HOUR: HCPCS | Mod: HQ

## 2022-05-04 NOTE — GROUP NOTE
"Group Therapy Documentation    PATIENT'S NAME: Cong Tuttle  MRN:   [id number]  :   1990  ACCT. NUMBER: [id number]  DATE OF SERVICE: 22  START TIME:  9:00 AM  END TIME: 11:00 AM  FACILITATOR(S): Kelle Mcgowan LADC  TOPIC: BEH Group Therapy  Number of patients attending the group:  7  Group Length:  2 Hours    Group Therapy Type: Recovery strategies    Summary of Group / Topics Discussed:    Recovery Principles      Group Attendance:  Attended group session    Patient's response to the group topic/interactions:  cooperative with task    Patient appeared to be Actively participating.        Client specific details:  Cong attended AM small group. Participated in daily check ins and question of the day. Patients discussed the changing of mindset of seeking help, and the ability to do so. \"I get to- verses- I have to.\"  Pt shared feedback to peers who shared assignments. .      "

## 2022-05-04 NOTE — GROUP NOTE
Group Therapy Documentation    PATIENT'S NAME: Cong Tuttle  MRN:   [id number]  :   1990  ACCT. NUMBER: [id number]  DATE OF SERVICE: 22  START TIME:  8:30 AM  END TIME:  9:30 AM  FACILITATOR(S): Thomas Villanueva LADC  TOPIC: BEH Group Therapy  Number of patients attending the group:  7  Group Length:  1 Hours    Group Therapy Type: Recovery strategies    Summary of Group / Topics Discussed:    Recovery Principles      Group Attendance:  Attended group session    Patient's response to the group topic/interactions:  cooperative with task    Patient appeared to be Actively participating.        Client specific details:  Cong participated and interacted appropriately with peers and staff in lecture. No triggers to use noted or discussed.

## 2022-05-04 NOTE — GROUP NOTE
Group Therapy Documentation    PATIENT'S NAME: Cong Tuttle  MRN:   [id number]  :   1990  ACCT. NUMBER: [id number]  DATE OF SERVICE: 22  START TIME: 12:30 PM  END TIME:  2:30 PM  FACILITATOR(S): Thomas Villanueva LADC  TOPIC: BEH Group Therapy  Number of patients attending the group:  7  Group Length:  2 Hours    Group Therapy Type: Recovery strategies    Summary of Group / Topics Discussed:    Disease of addiction      Group Attendance:  Attended group session    Patient's response to the group topic/interactions:  cooperative with task    Patient appeared to be Actively participating.        Client specific details:  Cong participated and interacted appropriately with peers and staff in PM group. No triggers to use noted or discussed.

## 2022-05-04 NOTE — PROGRESS NOTES
It was reported that pt was seen again with pt he was restricted from spending 1:1 time with. Pt is also on female restriction. Writer informed pt that he needs to stay away from pt mentioned and that if he is seen spending time with pt either 1:1 or within a group of other pts he will be discharged. Pt verbalized acknowledgment.

## 2022-05-05 ENCOUNTER — HOSPITAL ENCOUNTER (OUTPATIENT)
Dept: BEHAVIORAL HEALTH | Facility: CLINIC | Age: 32
Discharge: HOME | End: 2022-05-05
Attending: FAMILY MEDICINE | Admitting: FAMILY MEDICINE
Payer: COMMERCIAL

## 2022-05-05 PROCEDURE — H2035 A/D TX PROGRAM, PER HOUR: HCPCS | Mod: HQ

## 2022-05-05 PROCEDURE — 1002N00001 HC LODGING PLUS FACILITY CHARGE ADULT

## 2022-05-05 NOTE — PROGRESS NOTES
"Care Conference Addendum:  5/4/22    Counselors met with patient to discuss recent concerns about skipping meals and excessive exercise. Pt has a history with Dual Diagnosis concerns related to CHRIS and Anorexia Nervosa.   Counselors expressed concern to client about noticing recent changes to his behavior; and inquired patients thoughts surrounding the concerns.   Patient was able to verbalize why counselors would be concerned given his history with eating disorder dx.   Counselors asked client if he felt comfortable bringing concerns to staff and processing any concerns. Patient responded , \" no I am able to talk to Monica about stuff.\"  Patient and counselors discussed patient no longer skipping lunch, and being mindful of exercising to excess. Eating small meals throughout the day , and the ability to still obtain nutrients and the energy needed to get through day to day in treatment were reviewed.     Patient responded well to conversation and stated he had completed \"[facility]\" and one other program for his past eating disorder concerns. \" I know what they would say about this behavior, now that I think about it.\"   Patient verbalized he was going to no longer skip meals and talk with staff if he is wanting to process or express concerns.         Counselors Present:   VALENCIA Lay LADC  "

## 2022-05-05 NOTE — GROUP NOTE
Group Therapy Documentation    PATIENT'S NAME: Cong Tuttle  MRN:   [id number]  :   1990  ACCT. NUMBER: [id number]  DATE OF SERVICE: 22  START TIME: 12:30 AM  END TIME:  2:30 PM  FACILITATOR(S): Thomas Villanueva LADC  TOPIC: BEH Group Therapy  Number of patients attending the group:  7  Group Length:  2 Hours    Group Therapy Type: Health and wellbeing     Summary of Group / Topics Discussed:    Self-care activities      Group Attendance:  Attended group session    Patient's response to the group topic/interactions:  cooperative with task    Patient appeared to be Actively participating.        Client specific details:  Cong participated and interacted appropriately with peers and staff in PM group. No triggers to use noted or discussed.

## 2022-05-05 NOTE — GROUP NOTE
Group Therapy Documentation    PATIENT'S NAME: Cong Tuttle  MRN:   [id number]  :   1990  ACCT. NUMBER: [id number]  DATE OF SERVICE: 22  START TIME:  9:00 AM  END TIME: 11:00 AM  FACILITATOR(S): Kelle Mcgowan LADC  TOPIC: BEH Group Therapy  Number of patients attending the group:  7  Group Length:  2 Hours    Group Therapy Type: Recovery strategies    Summary of Group / Topics Discussed:    Recovery Principles      Group Attendance:  Attended group session    Patient's response to the group topic/interactions:  cooperative with task    Patient appeared to be Actively participating.        Client specific details:  Cong attended AM small group. Patients engaged in discussion addressing recovery principles. Patients shared homework, and provided feedback. No other concerns reported.

## 2022-05-05 NOTE — GROUP NOTE
Psychoeducation Group Documentation    PATIENT'S NAME: Cong Tuttle  MRN:   [id number]  :   1990  ACCT. NUMBER: [id number]  DATE OF SERVICE: 22  START TIME:  3:00 PM  END TIME:  4:00 PM  FACILITATOR(S): Ra Brannon LADC; Bert Holden LADC; Zhanna Bray LADC  TOPIC: BEH Pyschoeducation  Number of patients attending the group:  26  Group Length:  1 Hours    Skills Group Therapy Type: Healthy behaviors development    Summary of Group / Topics Discussed:    Balanced lifestyle skills and Symptom management skills    Group Attendance:  Attended group session    Patient's response to the group topic/interactions:  cooperative with task    Patient appeared to be Attentive and Engaged.         Client specific details: Pt listened respectfully to Delfina Arriaga's lecture on problem gambling's intersection with chemical dependency.

## 2022-05-05 NOTE — ADDENDUM NOTE
Encounter addended by: Kelle Mcgowan LADC on: 5/5/2022 10:10 AM   Actions taken: Clinical Note Signed

## 2022-05-06 ENCOUNTER — TELEPHONE (OUTPATIENT)
Dept: BEHAVIORAL HEALTH | Facility: CLINIC | Age: 32
End: 2022-05-06
Payer: COMMERCIAL

## 2022-05-06 NOTE — ADDENDUM NOTE
Encounter addended by: Thomas Villanueva LADC on: 5/6/2022 10:53 AM   Actions taken: Episode edited, Episode resolved

## 2022-05-06 NOTE — ADDENDUM NOTE
Encounter addended by: Thomas Villanueva LADC on: 5/6/2022 10:38 AM   Actions taken: Clinical Note Signed

## 2022-05-06 NOTE — TELEPHONE ENCOUNTER
Pt left security envelope [id number] containing trazodone, clindamycin and seroquel. Writer left message alerting pt and requesting a call back if he was going to come get meds or have them mailed.

## 2022-05-06 NOTE — TELEPHONE ENCOUNTER
Pt called back and said he did not need any of the medications that were left behind, meds were destroyed following hospital policy for non-controlled meds

## 2022-05-06 NOTE — PROGRESS NOTES
Due to patient not following program guidelines, of staying with same gender peers, and not coupling off with female peers. Patient was advised he would be discharged on this day. Patient has been talked to by staff prior on occasion and was on female restriction, and reminded by staff multiple times.     Patient was discharged from [facility] 5/6/22.   Patient will need staff consult for re-admission. Recommended to attend a mens only program.       VALENCIA Lay

## 2022-05-06 NOTE — PROGRESS NOTES
CHEMICAL DEPENDENCY DISCHARGE SUMMARY    PATIENT NAME: Cong Tuttle      : 1990      EVALUATION COUNSELOR: Rex Fisher supervised by Carol Ruano [facility]   TREATMENT COUNSELORS: Thomas Villanueva [facility]; Kelle Mcgowan [facility]  REFERRAL SOURCE: Self    PROGRAM: [facility]  ADMISSION DATE: 2022   DATE OF LAST SESSION: 2022   DISCHARGE DATE: 2022    ADMISSION DIAGNOSIS:    Alcohol Use Disorder 303.90 (F10.20) Severe    DISCHARGE DIAGNOSIS:  Alcohol Use Disorder 303.90 (F10.20) Severe    DISCHARGE STATUS:   LAST USE DATE: Patient reported last date of use as 2022  DAYS OF TREATMENT COMPLETED:  Patient completed 22 days of treatment    PRESENTING INFORMATION: Cong Tuttle presented to the [facility] in [place], [place] for a substance use disorder evaluation. The pt was a self referent. The pt was seeking tx for alcohol use. The pt denied mental health concerns, however, his chart indicated a hx of eating disorder, head trauma,  Major Depressive Disorder, Borderline Personality Disorder, Generalized Anxiety Disorder, PTSD, hx of non-suicidal self injury, hx of suicidal ideation, alcohol use disorder, and prior tx and detox episodes. Pt denied unemployment and homelessness. Pt was assessed to be appropriate for substance use disorder tx at [facility].          READMITTANCE INFORMATION: If additional substance use disorder tx is required, an all male longterm MI/CD tx program is recommended due to pt's SPMI and behavioral discharge from [facility].      SERVICES PROVIDED:  Our services included assessment, treatment planning and education regarding chemical dependency, mental illness, relationships, and relapse prevention.  The patient also participated in individual therapy, group therapy, recovery oriented workshops, spiritual care counseling, recovery skills training, and aftercare planning.    ISSUES ADDRESS IN  "TREATMENT:    DIMENSION 1 - ACUTE INTOXICATION/WITHDRAWAL POTENTIAL  ADMISSION RISK RATIN   DISCHARGE RISK RATIN   Patient entered into [facility] on 2022. Patient reported substances of use as alcohol. Patient reported last date of use as 2022. Throughout treatment patient denied any withdrawal symptoms that would interfere with full participation in treatment programming.     DIMENSION 2 - BIOMEDICAL COMPLICATIONS AND CONDITIONS  ADMISSION RISK RATIN   DISCHARGE RISK RATIN   Upon admissions patient denied any medical concerns that would interfere with full participation in treatment programming. Patient reported having a PCP: Tesha Rodriguez at the [facility]. Patient maintain medication compliance throughout treatment. Upon discharge patient appeared able to access medical aid as needed.     DIMENSION 3 - EMOTIONAL, BEHAVIORAL, COGNITIVE CONDITIONS AND COMPLICATIONS  ADMISSION RISK RATIN   DISCHARGE RISK RATIN   Upon admissions the pt denied mental health concerns, however, his chart indicated mental health diagnoses of Major Depressive Disorder, Borderline Personality Disorder, Generalized Anxiety Disorder, and PTSD. To address this concern patient met with staff therapist AIRAM May. Patient reported this as a healthy therapeutic relationship. Upon admissions patient was given a suicidal risk screening. Patient was rated as Moderate Risk\". Upon discharge patient denied any suicidal thoughts or ideations.     DIMENSION 4 - READINESS FOR CHANGE  ADMISSION RISK RATING: 3   DISCHARGE RISK RATIN   Goal: Develop a greater awareness of substance use patterns and identify consequences of addiction, strength and commitment to making significant lifestyle changes supportive of recovery. Intervention: Pt completed and presented his drug use history/ progression assignment. The assignment helps to identify patterns of use and related emotional costs. The pt " "presented as an active and willing participant in all scheduled programming despite repeated behavioral issues. Pt was also provided with a \"First Step\" assignment which was meant to develop his understanding of the AA's First Step and how it relates to his tx program and recovery.    DIMENSION 5 - RELAPSE, CONTINUED USE AND CONTINUED PROBLEM POTENTIAL   ADMISSION RISK RATIN   DISCHARGE RISK RATING: 3   Goal: Structure an aftercare plan, which will provide for continued support and skills development. Intervention: The pt was encouraged by his primary counselors to consider transitioning into an outpatient tx program after completing [facility]. The pt was accepted into [facility] prior to being discharged from [facility]. Goal: Gain insight into personal relapse cues and effective prevention strategies. Intervention: Pt attended two relapse prevention workshops. The pt was provided assignments to develop a personal relapse prevention plan and understand potential self-sabotaging, triggers, and cues. Pt was also required to attend at least three sober support meetings a week while in [facility].    DIMENSION 6 - RECOVERY ENVIRONMENT  ADMISSION RISK RATING: 3   DISCHARGE RISK RATING: 3   Goal: Ensure that living environment is supportive of recovery. Intervention: Pt declined attending sober housing after discharge from [facility]. Pt reported being interested in moving back into an AirB[employer]B after discharge from [facility]. This was not [facility] staff recommendation. Goal: Begin to develop skills necessary to building a sober support network, identify helpful community resources. Intervention: The pt was provided information/ suggestions about getting a temporary sponsor and resources were made available to assist pt in identifying sober support group times and locations he might attend after completing [facility]. Goal: Begin process of healing personal relationships damaged by " ongoing use and related behaviors. Intervention: Pt was encouraged to invite family/ concerned persons to attend an individual session via telephone. Pt's family/ concerned persons did not participate in the session. The pt attended two weekend workshops on personal relationships.          STRENGTHS:  Patient maintained a positive attitude and appeared rediretable while in treatment. Although, pt was discharged from [facility] due to behavioral reasons even after repeated redirecting and warnings. Patient was an active participant in group therapy and was always open for feedback from his counselors and peers. Patient appears reluctantly interested in recovery at this time. Pt verbalizes willing to incorporate positive changes into his life, however, his behaviors may create a barrier for recovery.     LIVING ARRANGEMENTS AT DISCHARGE: Pt reported that he had a safe place to go upon discharge from [facility]. Pt reported that he will attempt to admit into his referred IOP at [facility] or attempt to admit into [facility].       PROGNOSIS:  Prognosis for this patient is guarded at this time. This can be upgraded if the patient follows the continuing care recommendations below.      CONTINUING CARE RECOMMENDATIONS AND REFERRALS:    1.  Abstain from all mood-altering chemicals unless prescribed by a licensed medical provider, and take all medications as prescribed.  2.  Attend a minimum of three AA/NA/Sober support groups weekly in the community.  3.  Obtain a permanent male sponsor and maintain regular contact with him.  4.  Admit immediately into an IOP and follow all recommendations.  5.  Continue to invest in building a sober support network.  6.  Continue to monitor and understand relapse triggers and stressors through the use and development of healthy coping skills.  7.  Obtain a mental health therapist and attend all scheduled programming  8. Attend all scheduled medical appointments.  9.  Take medication as prescribed       This information has been disclosed to you from records protected by Federal confidentiality rules (42 CFR part 2). The Federal rules prohibit you from making any further disclosure of this information unless further disclosure is expressly permitted by the written consent of the person to whom it pertains or as otherwise permitted by 42 CFR part 2. A general authorization for the release of medical or other information is NOT sufficient for this purpose. The Federal rules restrict any use of the information to criminally investigate or prosecute any alcohol or drug abuse patient.       VALENCIA Bryson

## 2022-05-06 NOTE — ADDENDUM NOTE
Encounter addended by: Kelle Mcgowan LADC on: 5/6/2022 9:27 AM   Actions taken: Clinical Note Signed

## 2022-06-13 ENCOUNTER — HOSPITAL ENCOUNTER (INPATIENT)
Facility: CLINIC | Age: 32
LOS: 1 days | Discharge: HOME | End: 2022-06-15
Attending: EMERGENCY MEDICINE | Admitting: PSYCHIATRY & NEUROLOGY
Payer: COMMERCIAL

## 2022-06-13 DIAGNOSIS — F10.220 ALCOHOL DEPENDENCE WITH UNCOMPLICATED INTOXICATION (H): ICD-10-CM

## 2022-06-13 DIAGNOSIS — F10.920 ALCOHOLIC INTOXICATION WITHOUT COMPLICATION (H): ICD-10-CM

## 2022-06-13 DIAGNOSIS — F10.10 ALCOHOL ABUSE: ICD-10-CM

## 2022-06-13 DIAGNOSIS — Z11.52 ENCOUNTER FOR SCREENING LABORATORY TESTING FOR SEVERE ACUTE RESPIRATORY SYNDROME CORONAVIRUS 2 (SARS-COV-2): ICD-10-CM

## 2022-06-13 PROCEDURE — C9803 HOPD COVID-19 SPEC COLLECT: HCPCS

## 2022-06-13 PROCEDURE — 82075 ASSAY OF BREATH ETHANOL: CPT

## 2022-06-13 PROCEDURE — 99285 EMERGENCY DEPT VISIT HI MDM: CPT | Performed by: EMERGENCY MEDICINE

## 2022-06-13 PROCEDURE — 99285 EMERGENCY DEPT VISIT HI MDM: CPT | Mod: 25

## 2022-06-14 ENCOUNTER — TELEPHONE (OUTPATIENT)
Dept: BEHAVIORAL HEALTH | Facility: CLINIC | Age: 32
End: 2022-06-14

## 2022-06-14 PROBLEM — F10.20 ALCOHOL DEPENDENCE (H): Status: ACTIVE | Noted: 2022-06-14

## 2022-06-14 LAB
ALBUMIN SERPL-MCNC: 3.6 G/DL (ref 3.4–5)
ALCOHOL BREATH TEST: 0.13 (ref 0–0.01)
ALP SERPL-CCNC: 74 U/L (ref 40–150)
ALT SERPL W P-5'-P-CCNC: 30 U/L (ref 0–70)
AMPHETAMINES UR QL SCN: NORMAL
ANION GAP SERPL CALCULATED.3IONS-SCNC: 8 MMOL/L (ref 3–14)
AST SERPL W P-5'-P-CCNC: 23 U/L (ref 0–45)
BARBITURATES UR QL: NORMAL
BASOPHILS # BLD AUTO: 0 10E3/UL (ref 0–0.2)
BASOPHILS NFR BLD AUTO: 1 %
BENZODIAZ UR QL: NORMAL
BILIRUB SERPL-MCNC: 1.2 MG/DL (ref 0.2–1.3)
BUN SERPL-MCNC: 18 MG/DL (ref 7–30)
CALCIUM SERPL-MCNC: 8.8 MG/DL (ref 8.5–10.1)
CANNABINOIDS UR QL SCN: NORMAL
CHLORIDE BLD-SCNC: 106 MMOL/L (ref 94–109)
CO2 SERPL-SCNC: 27 MMOL/L (ref 20–32)
COCAINE UR QL: NORMAL
CREAT SERPL-MCNC: 0.91 MG/DL (ref 0.66–1.25)
EOSINOPHIL # BLD AUTO: 0.2 10E3/UL (ref 0–0.7)
EOSINOPHIL NFR BLD AUTO: 3 %
ERYTHROCYTE [DISTWIDTH] IN BLOOD BY AUTOMATED COUNT: 11.5 % (ref 10–15)
GFR SERPL CREATININE-BSD FRML MDRD: >90 ML/MIN/1.73M2
GLUCOSE BLD-MCNC: 106 MG/DL (ref 70–99)
HCT VFR BLD AUTO: 38.7 % (ref 40–53)
HGB BLD-MCNC: 13.7 G/DL (ref 13.3–17.7)
IMM GRANULOCYTES # BLD: 0 10E3/UL
IMM GRANULOCYTES NFR BLD: 0 %
LIPASE SERPL-CCNC: 119 U/L (ref 73–393)
LYMPHOCYTES # BLD AUTO: 1.8 10E3/UL (ref 0.8–5.3)
LYMPHOCYTES NFR BLD AUTO: 30 %
MCH RBC QN AUTO: 33.3 PG (ref 26.5–33)
MCHC RBC AUTO-ENTMCNC: 35.4 G/DL (ref 31.5–36.5)
MCV RBC AUTO: 94 FL (ref 78–100)
MONOCYTES # BLD AUTO: 0.6 10E3/UL (ref 0–1.3)
MONOCYTES NFR BLD AUTO: 9 %
NEUTROPHILS # BLD AUTO: 3.4 10E3/UL (ref 1.6–8.3)
NEUTROPHILS NFR BLD AUTO: 57 %
NRBC # BLD AUTO: 0 10E3/UL
NRBC BLD AUTO-RTO: 0 /100
OPIATES UR QL SCN: NORMAL
PLATELET # BLD AUTO: 253 10E3/UL (ref 150–450)
POTASSIUM BLD-SCNC: 4 MMOL/L (ref 3.4–5.3)
PROT SERPL-MCNC: 6.8 G/DL (ref 6.8–8.8)
RBC # BLD AUTO: 4.11 10E6/UL (ref 4.4–5.9)
SARS-COV-2 RNA RESP QL NAA+PROBE: NEGATIVE
SODIUM SERPL-SCNC: 141 MMOL/L (ref 133–144)
WBC # BLD AUTO: 5.9 10E3/UL (ref 4–11)

## 2022-06-14 PROCEDURE — 83690 ASSAY OF LIPASE: CPT | Performed by: EMERGENCY MEDICINE

## 2022-06-14 PROCEDURE — 128N000004 HC R&B CD ADULT

## 2022-06-14 PROCEDURE — H2032 ACTIVITY THERAPY, PER 15 MIN: HCPCS

## 2022-06-14 PROCEDURE — 87635 SARS-COV-2 COVID-19 AMP PRB: CPT | Performed by: EMERGENCY MEDICINE

## 2022-06-14 PROCEDURE — 250N000013 HC RX MED GY IP 250 OP 250 PS 637: Performed by: PSYCHIATRY & NEUROLOGY

## 2022-06-14 PROCEDURE — 36415 COLL VENOUS BLD VENIPUNCTURE: CPT | Performed by: EMERGENCY MEDICINE

## 2022-06-14 PROCEDURE — 99222 1ST HOSP IP/OBS MODERATE 55: CPT | Mod: AI | Performed by: PSYCHIATRY & NEUROLOGY

## 2022-06-14 PROCEDURE — 80053 COMPREHEN METABOLIC PANEL: CPT | Performed by: EMERGENCY MEDICINE

## 2022-06-14 PROCEDURE — 250N000011 HC RX IP 250 OP 636: Performed by: PSYCHIATRY & NEUROLOGY

## 2022-06-14 PROCEDURE — 85025 COMPLETE CBC W/AUTO DIFF WBC: CPT | Performed by: EMERGENCY MEDICINE

## 2022-06-14 PROCEDURE — 250N000013 HC RX MED GY IP 250 OP 250 PS 637: Performed by: EMERGENCY MEDICINE

## 2022-06-14 PROCEDURE — 128N000001 HC R&B CD/MH ADULT

## 2022-06-14 PROCEDURE — 80307 DRUG TEST PRSMV CHEM ANLYZR: CPT | Performed by: EMERGENCY MEDICINE

## 2022-06-14 PROCEDURE — HZ2ZZZZ DETOXIFICATION SERVICES FOR SUBSTANCE ABUSE TREATMENT: ICD-10-PCS | Performed by: PSYCHIATRY & NEUROLOGY

## 2022-06-14 RX ORDER — TRAZODONE HYDROCHLORIDE 50 MG/1
50 TABLET, FILM COATED ORAL
Status: DISCONTINUED | OUTPATIENT
Start: 2022-06-14 | End: 2022-06-14

## 2022-06-14 RX ORDER — FOLIC ACID 1 MG/1
1 TABLET ORAL DAILY
Status: DISCONTINUED | OUTPATIENT
Start: 2022-06-14 | End: 2022-06-15 | Stop reason: HOSPADM

## 2022-06-14 RX ORDER — TRAZODONE HYDROCHLORIDE 50 MG/1
50 TABLET, FILM COATED ORAL
Status: DISCONTINUED | OUTPATIENT
Start: 2022-06-14 | End: 2022-06-15 | Stop reason: HOSPADM

## 2022-06-14 RX ORDER — MULTIPLE VITAMINS W/ MINERALS TAB 9MG-400MCG
1 TAB ORAL DAILY
Status: DISCONTINUED | OUTPATIENT
Start: 2022-06-14 | End: 2022-06-15 | Stop reason: HOSPADM

## 2022-06-14 RX ORDER — ACETAMINOPHEN 325 MG/1
650 TABLET ORAL EVERY 4 HOURS PRN
Status: DISCONTINUED | OUTPATIENT
Start: 2022-06-14 | End: 2022-06-15 | Stop reason: HOSPADM

## 2022-06-14 RX ORDER — LANOLIN ALCOHOL/MO/W.PET/CERES
3 CREAM (GRAM) TOPICAL
Status: DISCONTINUED | OUTPATIENT
Start: 2022-06-14 | End: 2022-06-15 | Stop reason: HOSPADM

## 2022-06-14 RX ORDER — PRAZOSIN HYDROCHLORIDE 1 MG/1
1 CAPSULE ORAL AT BEDTIME
Status: DISCONTINUED | OUTPATIENT
Start: 2022-06-14 | End: 2022-06-15 | Stop reason: HOSPADM

## 2022-06-14 RX ORDER — DIAZEPAM 5 MG
5-20 TABLET ORAL EVERY 30 MIN PRN
Status: DISCONTINUED | OUTPATIENT
Start: 2022-06-14 | End: 2022-06-14

## 2022-06-14 RX ORDER — DIAZEPAM 5 MG
5-20 TABLET ORAL EVERY 30 MIN PRN
Status: DISCONTINUED | OUTPATIENT
Start: 2022-06-14 | End: 2022-06-15 | Stop reason: HOSPADM

## 2022-06-14 RX ORDER — MAGNESIUM HYDROXIDE/ALUMINUM HYDROXICE/SIMETHICONE 120; 1200; 1200 MG/30ML; MG/30ML; MG/30ML
30 SUSPENSION ORAL EVERY 4 HOURS PRN
Status: DISCONTINUED | OUTPATIENT
Start: 2022-06-14 | End: 2022-06-15 | Stop reason: HOSPADM

## 2022-06-14 RX ORDER — LORATADINE 10 MG/1
10 TABLET ORAL DAILY
Status: DISCONTINUED | OUTPATIENT
Start: 2022-06-14 | End: 2022-06-14

## 2022-06-14 RX ORDER — OLANZAPINE 10 MG/1
10 TABLET ORAL 3 TIMES DAILY PRN
Status: DISCONTINUED | OUTPATIENT
Start: 2022-06-14 | End: 2022-06-15 | Stop reason: HOSPADM

## 2022-06-14 RX ORDER — HYDROXYZINE HYDROCHLORIDE 25 MG/1
25 TABLET, FILM COATED ORAL EVERY 4 HOURS PRN
Status: DISCONTINUED | OUTPATIENT
Start: 2022-06-14 | End: 2022-06-15 | Stop reason: HOSPADM

## 2022-06-14 RX ORDER — OLANZAPINE 10 MG/2ML
10 INJECTION, POWDER, FOR SOLUTION INTRAMUSCULAR 3 TIMES DAILY PRN
Status: DISCONTINUED | OUTPATIENT
Start: 2022-06-14 | End: 2022-06-15 | Stop reason: HOSPADM

## 2022-06-14 RX ORDER — LOPERAMIDE HCL 2 MG
2 CAPSULE ORAL 4 TIMES DAILY PRN
Status: DISCONTINUED | OUTPATIENT
Start: 2022-06-14 | End: 2022-06-15 | Stop reason: HOSPADM

## 2022-06-14 RX ORDER — MULTIPLE VITAMINS W/ MINERALS TAB 9MG-400MCG
1 TAB ORAL DAILY
Status: DISCONTINUED | OUTPATIENT
Start: 2022-06-14 | End: 2022-06-14

## 2022-06-14 RX ORDER — ONDANSETRON 4 MG/1
4 TABLET, ORALLY DISINTEGRATING ORAL EVERY 6 HOURS PRN
Status: DISCONTINUED | OUTPATIENT
Start: 2022-06-14 | End: 2022-06-15 | Stop reason: HOSPADM

## 2022-06-14 RX ORDER — POLYETHYLENE GLYCOL 3350 17 G/17G
17 POWDER, FOR SOLUTION ORAL DAILY PRN
Status: DISCONTINUED | OUTPATIENT
Start: 2022-06-14 | End: 2022-06-15 | Stop reason: HOSPADM

## 2022-06-14 RX ADMIN — MULTIPLE VITAMINS W/ MINERALS TAB 1 TABLET: TAB at 14:59

## 2022-06-14 RX ADMIN — THIAMINE HCL TAB 100 MG 100 MG: 100 TAB at 14:59

## 2022-06-14 RX ADMIN — ACETAMINOPHEN 325MG 650 MG: 325 TABLET ORAL at 20:46

## 2022-06-14 RX ADMIN — PRAZOSIN HYDROCHLORIDE 1 MG: 1 CAPSULE ORAL at 22:08

## 2022-06-14 RX ADMIN — ONDANSETRON 4 MG: 4 TABLET, ORALLY DISINTEGRATING ORAL at 20:46

## 2022-06-14 RX ADMIN — FOLIC ACID 1 MG: 1 TABLET ORAL at 14:59

## 2022-06-14 RX ADMIN — TRAZODONE HYDROCHLORIDE 50 MG: 50 TABLET ORAL at 22:08

## 2022-06-14 RX ADMIN — DIAZEPAM 5 MG: 5 TABLET ORAL at 10:27

## 2022-06-14 RX ADMIN — MELATONIN TAB 3 MG 3 MG: 3 TAB at 20:49

## 2022-06-14 ASSESSMENT — ACTIVITIES OF DAILY LIVING (ADL)
ORAL_HYGIENE: INDEPENDENT
DRESSING/BATHING_DIFFICULTY: NO
DOING_ERRANDS_INDEPENDENTLY_DIFFICULTY: NO
TOILETING_ISSUES: NO
FALL_HISTORY_WITHIN_LAST_SIX_MONTHS: NO
WALKING_OR_CLIMBING_STAIRS_DIFFICULTY: NO
DIFFICULTY_EATING/SWALLOWING: NO
HYGIENE/GROOMING: INDEPENDENT
CONCENTRATING,_REMEMBERING_OR_MAKING_DECISIONS_DIFFICULTY: NO
CHANGE_IN_FUNCTIONAL_STATUS_SINCE_ONSET_OF_CURRENT_ILLNESS/INJURY: NO
DRESS: INDEPENDENT
LAUNDRY: WITH SUPERVISION
WEAR_GLASSES_OR_BLIND: NO

## 2022-06-14 NOTE — H&P
Chief Complaint:     Patient admitted due to alcohol dependence      HPI:     Patient reports that he has been using alcohol since teen years but he believes that his drinking became problematic around age 25. He has been in CD treatment 6 times including twice inpatient. He is currently in outpatient program at [facility] where he has been participating for the past few weeks. His longest sober period was for 2 years from [id number]    Patient reports that he relapsed 5 days ago and has been using one liter of vodka per day since then.    Patient presented to the ER today because he was concerned he would have withdrawal if he stopped on his own.     Patient has used drugs in the past, but none recently and does not feel that he had a drug problem in the past.    Patient reports that he has been treated for depression in the past and has used different medications in the past including Prozac, Zoloft and Effexor. He does not feel that these medications were helpful. He reports that his mood has generally been better during sober periods. He denies substantial depression recently, but he does feel that he has been more 'detached'. He is not interested in anti-depressant treatment at this time.        Past Psychiatric History:     Patient reports that he has been treated for depression in the past and has used different medications in the past including Prozac, Zoloft and Effexor. He does not feel that these medications were helpful. He reports that his mood has generally been better during sober periods. He denies substantial depression recently, but he does feel that he has been more 'detached'. He is not interested in anti-depressant treatment at this time.        Substance Use and History:     Patient reports that he has been using alcohol since teen years but he believes that his drinking became problematic around age 25. He has been in CD treatment 6 times including twice inpatient. He is currently in outpatient  program at [facility] where he has been participating for the past few weeks. His longest sober period was for 2 years from [id number]        Past Medical History:   PAST MEDICAL HISTORY:   Past Medical History:   Diagnosis Date     Anxiety      Borderline personality disorder (H)      Depression        PAST SURGICAL HISTORY: No past surgical history on file.          Family History:   FAMILY HISTORY:   Family History   Problem Relation Age of Onset     Depression Mother      Anxiety Disorder Mother      Hypertension Father      Bipolar Disorder Brother      Patient does not know of chemical dependency history in family      Social History:   Please see the full psychosocial profile from the clinical treatment coordinator.   SOCIAL HISTORY:   Social History     Tobacco Use     Smoking status: Former Smoker     Packs/day: 0.50     Years: 10.00     Pack years: 5.00     Types: Other     Quit date: 3/2/2022     Years since quittin.2     Smokeless tobacco: Former User     Types: Chew     Tobacco comment: e cig   Substance Use Topics     Alcohol use: Not Currently     Comment: last drink was 2022     Patient completed HS and has a college degree. 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    Patient denies current romantic involvement and has no children    His parents live in [place] and he normally communicates with them.    Patient lives in a rented room and there are roommates there.         PTA Medications:     Medications Prior to Admission   Medication Sig Dispense Refill Last Dose     acetaminophen (TYLENOL) 325 MG tablet Take 325-650 mg by mouth every 4 hours as needed for mild pain   Past Week at Unknown time     alum & mag hydroxide-simethicone (MAALOX) 200-200-20 MG/5ML SUSP suspension Take 30 mLs by mouth every 6 hours as needed for indigestion   Past Week at Unknown time     ibuprofen (ADVIL/MOTRIN) 200 MG tablet Take 400 mg by mouth every 6 hours as needed for mild pain    Past Week at Unknown time     loratadine (CLARITIN) 10 MG tablet Take 10 mg by mouth as needed for allergies   Past Week at Unknown time     melatonin 3 MG tablet Take 1 tablet (3 mg) by mouth every evening 30 tablet 0 Past Week at Unknown time     multivitamin w/minerals (THERA-VIT-M) tablet Take 1 tablet by mouth in the morning. 30 tablet 0 Past Week at Unknown time     prazosin (MINIPRESS) 1 MG capsule Take 1 capsule (1 mg) by mouth At Bedtime 30 capsule 0 Past Month at Unknown time     traZODone (DESYREL) 50 MG tablet Take 1 tablet (50 mg) by mouth nightly as needed for sleep (may repeat after 60 minutes) 30 tablet 0 Past Week at Unknown time            Current Medications:       folic acid  1 mg Oral Daily     loratadine  10 mg Oral Daily     multivitamin w/minerals  1 tablet Oral Daily     prazosin  1 mg Oral At Bedtime     thiamine  100 mg Oral Daily     acetaminophen, alum & mag hydroxide-simethicone, diazepam, hydrOXYzine, OLANZapine **OR** OLANZapine, polyethylene glycol, traZODone         Allergies:   No Known Allergies       Labs:     Recent Results (from the past 48 hour(s))   Alcohol breath test POCT    Collection Time: 06/14/22  2:01 AM   Result Value Ref Range    Alcohol Breath Test 0.132 (A) 0.00 - 0.01   Drug abuse screen 1 urine (ED)    Collection Time: 06/14/22  6:10 AM   Result Value Ref Range    Amphetamines Urine Screen Negative Screen Negative    Barbiturates Urine Screen Negative Screen Negative    Benzodiazepines Urine Screen Negative Screen Negative    Cannabinoids Urine Screen Negative Screen Negative    Cocaine Urine Screen Negative Screen Negative    Opiates Urine Screen Negative Screen Negative   Comprehensive metabolic panel    Collection Time: 06/14/22  8:36 AM   Result Value Ref Range    Sodium 141 133 - 144 mmol/L    Potassium 4.0 3.4 - 5.3 mmol/L    Chloride 106 94 - 109 mmol/L    Carbon Dioxide (CO2) 27 20 - 32 mmol/L    Anion Gap 8 3 - 14 mmol/L    Urea Nitrogen 18 7 - 30 mg/dL     Creatinine 0.91 0.66 - 1.25 mg/dL    Calcium 8.8 8.5 - 10.1 mg/dL    Glucose 106 (H) 70 - 99 mg/dL    Alkaline Phosphatase 74 40 - 150 U/L    AST 23 0 - 45 U/L    ALT 30 0 - 70 U/L    Protein Total 6.8 6.8 - 8.8 g/dL    Albumin 3.6 3.4 - 5.0 g/dL    Bilirubin Total 1.2 0.2 - 1.3 mg/dL    GFR Estimate >90 >60 mL/min/1.73m2   Lipase    Collection Time: 06/14/22  8:36 AM   Result Value Ref Range    Lipase 119 73 - 393 U/L   CBC with platelets and differential    Collection Time: 06/14/22  8:36 AM   Result Value Ref Range    WBC Count 5.9 4.0 - 11.0 10e3/uL    RBC Count 4.11 (L) 4.40 - 5.90 10e6/uL    Hemoglobin 13.7 13.3 - 17.7 g/dL    Hematocrit 38.7 (L) 40.0 - 53.0 %    MCV 94 78 - 100 fL    MCH 33.3 (H) 26.5 - 33.0 pg    MCHC 35.4 31.5 - 36.5 g/dL    RDW 11.5 10.0 - 15.0 %    Platelet Count 253 150 - 450 10e3/uL    % Neutrophils 57 %    % Lymphocytes 30 %    % Monocytes 9 %    % Eosinophils 3 %    % Basophils 1 %    % Immature Granulocytes 0 %    NRBCs per 100 WBC 0 <1 /100    Absolute Neutrophils 3.4 1.6 - 8.3 10e3/uL    Absolute Lymphocytes 1.8 0.8 - 5.3 10e3/uL    Absolute Monocytes 0.6 0.0 - 1.3 10e3/uL    Absolute Eosinophils 0.2 0.0 - 0.7 10e3/uL    Absolute Basophils 0.0 0.0 - 0.2 10e3/uL    Absolute Immature Granulocytes 0.0 <=0.4 10e3/uL    Absolute NRBCs 0.0 10e3/uL   Asymptomatic COVID-19 Virus (Coronavirus) by PCR Nasopharyngeal    Collection Time: 06/14/22  8:37 AM    Specimen: Nasopharyngeal; Swab   Result Value Ref Range    SARS CoV2 PCR Negative Negative          Physical Exam:     /77   Pulse (!) 46   Temp 97.7  F (36.5  C) (Temporal)   Resp 16   Ht 1.829 m (6')   Wt 73.5 kg (162 lb)   SpO2 98%   BMI 21.97 kg/m    Weight is 162 lbs 0 oz  Body mass index is 21.97 kg/m .    Physical Exam:  Gen: No acute distress  HEENT: EOMI, no nystagmus or scleral icterus, moist mucous membranes  Skin: No diaphoresis or rash         Physical ROS:     Review of Systems is otherwise negative including  HEENT, CV, Respiratory, GI, , Musculoskeletal, Neurologic, Dermatologic, Endocrine, Immunological, Constitutional systems           Mental Status Exam:     Mental Status  Patient is casually dressed  Hygiene good  Speech fluent  Thought Process logical  Thought Content:  No suicidal ideation currently,    No homicidal ideation,   No ideas of reference,    No loose associations,    No auditory hallucinations,     No visual hallucinations   No delusions  Psychomotor: No agitation or slowing  Cognition:  Alert and oriented to time place and person  Attention good  Concentration good  Memory normal including recent and remote memory  Mood:  Some dysphoria  Affect: mood congruent  Judgement: normal  Eye contact good  Cooperation good  Language normal  Fund of knowledge normal  Musculoskeletal normal gait with no abnormal movements         Admission Diagnoses:   Alcohol dependence (H)    Patient Active Problem List    Diagnosis Date Noted     Alcohol dependence (H) 06/14/2022     Priority: Medium     Chemical dependency (H) 04/13/2022     Priority: Medium     Alcohol abuse 04/10/2022     Priority: Medium     Borderline personality disorder (H) 10/03/2019     Priority: Medium     Atypical eating disorder 07/27/2019     Priority: Medium     Recurrent major depression in partial remission (H) 04/23/2019     Priority: Medium     Alcohol use disorder, severe, in sustained remission, dependence (H) 10/31/2018     Priority: Medium     Cannabis use disorder, severe, in sustained remission (H) 10/31/2018     Priority: Medium     Generalized anxiety disorder 10/03/2018     Priority: Medium     Moderate episode of recurrent major depressive disorder (H) 10/03/2018     Priority: Medium              Assessment:     Patient presents with Alcohol dependence         Plan:     Legal: voluntary      Medication: no anti-depressant treatment for now    Consults: Hospitalist will be consulted if medical issues arise      Multidisciplinary  Interventions:  to gather collateral information, coordinate care with outpatient providers and begin follow up planning      Disposition:Resume [facility]        More than 60 minutes spent on this visit with more than 50% time spent on coordination of care with staff, reviewing medical record, psychoeducation, providing supportive therapy regarding coping with chronic mental illness, entering orders and preparing documentation for the visit    Vernon Garibay MD    Initial Certification I certify that the inpatient psychiatric facility admission was medically necessary for treatment which could reasonably be expected to improve the patient s condition.        I estimate 3 days of hospitalization is necessary for proper treatment of the patient. My plans for post-hospital care this patient are [facility] and discharge to home     Vernon Garibay MD     -     6/14/2022     -

## 2022-06-14 NOTE — ED PROVIDER NOTES
ED Provider Note  [facility]      History     Chief Complaint   Patient presents with     Alcohol Intoxication     HPI  Cong Tuttle is a 31 year old male who has a past medical history of anxiety, depression, alcohol use disorder who presents emergency department for evaluation of alcohol intoxication.  Patient reports that he recently was sober however relapsed on Friday.  Patient reports that since Friday he has been drinking 1 L of vodka per day.  Last drink was prior to arrival.  Patient reports history of alcohol withdrawals, denies any history of alcohol withdrawal seizures or DTs.  Patient here seeking detox and states he would like to stop drinking.  Patient with no acute medical complaints.  Denies any fever, chills, nausea, vomiting, chest pain, shortness of breath, dysuria.   patient denies any suicide ideation, homicidal nation, or intent to self-harm.  Patient reports that he continues but denies any other substance abuse.    Past Medical History  Past Medical History:   Diagnosis Date     Anxiety      Borderline personality disorder (H)      Depression      No past surgical history on file.  acetaminophen (TYLENOL) 325 MG tablet  alum & mag hydroxide-simethicone (MAALOX) 200-200-20 MG/5ML SUSP suspension  benzocaine-menthol (CEPACOL) 15-3.6 MG lozenge  escitalopram (LEXAPRO) 20 MG tablet  folic acid (FOLVITE) 1 MG tablet  gabapentin (NEURONTIN) 100 MG capsule  guaiFENesin (ROBITUSSIN) 20 mg/mL SOLN solution  ibuprofen (ADVIL/MOTRIN) 200 MG tablet  loratadine (CLARITIN) 10 MG tablet  magnesium citrate solution  melatonin 3 MG tablet  multivitamin w/minerals (THERA-VIT-M) tablet  nicotine polacrilex (NICORETTE) 4 MG gum  prazosin (MINIPRESS) 1 MG capsule  senna-docusate (SENOKOT-S/PERICOLACE) 8.6-50 MG tablet  thiamine (B-1) 100 MG tablet  traZODone (DESYREL) 50 MG tablet      No Known Allergies  Family History  Family History   Problem Relation Age of Onset      Depression Mother      Anxiety Disorder Mother      Hypertension Father      Bipolar Disorder Brother      Social History   Social History     Tobacco Use     Smoking status: Former Smoker     Packs/day: 0.50     Years: 10.00     Pack years: 5.00     Types: Other     Quit date: 3/2/2022     Years since quittin.2     Smokeless tobacco: Former User     Types: Chew     Tobacco comment: e cig   Vaping Use     Vaping Use: Former   Substance Use Topics     Alcohol use: Not Currently     Comment: last drink was 2022     Drug use: Not Currently      Past medical history, past surgical history, medications, allergies, family history, and social history were reviewed with the patient. No additional pertinent items.       Review of Systems  A complete review of systems was performed with pertinent positives and negatives noted in the HPI, and all other systems negative.    Physical Exam   BP: (!) 120/93  Pulse: 58  Temp: 97  F (36.1  C)  Resp: 16  Weight: 71.6 kg (157 lb 13.6 oz)  SpO2: 98 %  Physical Exam  General: Afebrile, no acute distress   HEENT: Normocephalic, atraumatic, conjunctivae normal. MMM  Neck: non-tender, supple  Cardio: regular rate. regular rhythm   Resp: Normal work of breathing, no respiratory distress, lungs clear bilaterally, no wheezing, rhonchi, rales  Chest/Back: no visual signs of trauma, no CVA tenderness   Abdomen: soft, non distension, no tenderness, no peritoneal signs   Neuro: alert and fully oriented. CN II-XII grossly intact. Grossly normal strength and sensation in all extremities.   MSK: no deformities. Normal range of motion  Integumentary/Skin: no rash visualized, normal color  Psych: normal affect, normal behavior    ED Course      Procedures    Results for orders placed or performed during the hospital encounter of 22   Alcohol breath test POCT     Status: Abnormal   Result Value Ref Range    Alcohol Breath Test 0.132 (A) 0.00 - 0.01   Urine Drugs of Abuse Screen      Status: None (In process)    Narrative    The following orders were created for panel order Urine Drugs of Abuse Screen.  Procedure                               Abnormality         Status                     ---------                               -----------         ------                     Drug abuse screen 1 urin...[[id number]]                      In process                   Please view results for these tests on the individual orders.     Medications   diazepam (VALIUM) tablet 5-20 mg (has no administration in time range)        Assessments & Plan (with Medical Decision Making)   Cong Tuttle is a 31 year old male who has a past medical history of anxiety, depression, alcohol use disorder who presents emergency department for evaluation of alcohol intoxication.  Upon arrival patient is well-appearing, afebrile, no distress.  Patient hemodynamically stable vital signs within normal limits.  Patient here with alcohol use, seeking detox, history of alcohol withdrawals however no history of seizures or DTs.  Denies any suicide ideation, homicide ideation, or intent to self-harm.  Patient placed on MSSA protocol.  Laboratory testing pending.  Plan on admission to detox.  Patient understands and agrees with plan.    I have reviewed the nursing notes. I have reviewed the findings, diagnosis, plan and need for follow up with the patient.    New Prescriptions    No medications on file       Final diagnoses:   Alcoholic intoxication without complication (H)   Alcohol abuse       --  Mercedes Moise MD  [facility] EMERGENCY DEPARTMENT  6/13/2022     Mercedes Moise MD  06/14/22 0656

## 2022-06-14 NOTE — PLAN OF CARE
Goal Outcome Evaluation:    Plan of Care Reviewed With: patient      Patient admit voluntary status from ED for alcohol withdrawal. He was sober one month before relapsing last Friday, 6/10/22 on one liter of vodka per day. Last use last night. Patient has been attending [address] outpatient CD treatment and did sign an MALENA for this program, as he would like to continue there but needs help with coordinating this and finding a sober house. He has been living in an [facility] for the past 8 months and is worried about relapse should he return there after discharge.   He recently also completed [facility] inpatient CD treatment recently about 5 weeks ago. He denied all other substance use. Does have PMHx of MDD, borderline personality d/o, JOSSIE, PTSD and eating disorder. Last completed [facility] outpatient for eating disorder last August 2021. He does have history of one suicide attempt 10/2019, in which he cut his arm with an [employer] knife and was hospitalized. Also has history of self-harm by burning and cutting. Last self-harm was burning himself to RLQ abdomen two weeks prior to admission.  At time of admission, patient was alert and fully oriented. Affect full range. Mood anxious. He endorsed guilt over relapse and subsequent loss of job due to drinking. He denied any current thoughts of suicide or not wanting to be alive. He denied any current thoughts of self-harm. MSSA 3.

## 2022-06-14 NOTE — PROGRESS NOTES
06/14/22 1227   Patient Belongings   Did you bring any home meds/supplements to the hospital?  No   Patient Belongings other (see comments)   Belongings Search Yes   Clothing Search Yes   Second Staff Kumar RN, Jose Antonio GAONA   Shoes, shorts in storage  Cell, wallet in med room  Key, fob, vape in sharps  Mn DL, H&M, EBT, 2 MC, medical in security    A               Admission:  I am responsible for any personal items that are not sent to the safe or pharmacy.  [facility] is not responsible for loss, theft or damage of any property in my possession.    Signature:  _________________________________ Date: _______  Time: _____                                              Staff Signature:  ____________________________ Date: ________  Time: _____      2nd Staff person, if patient is unable/unwilling to sign:    Signature: ________________________________ Date: ________  Time: _____     Discharge:  [facility] has returned all of my personal belongings:    Signature: _________________________________ Date: ________  Time: _____                                          Staff Signature:  ____________________________ Date: ________  Time: _____

## 2022-06-14 NOTE — TELEPHONE ENCOUNTER
R: The pt is currently in the [facility] awaiting placement.     9:50a Intake received call from ER provider w/ labs and medical clearance for detox.    9:58a Intake paged provider to present for Unit 3A ([facility]). Provider did not answer- Intake left a voicemail for a return call.     10:28a Provider returned intakes page- provider accepts the pt.     10:33a Intake called Unit 3A Charge RN to go over admission in que. Charge RN is not available. Intake requests a return call to discuss admission.     10:33a Intake called [facility] to go over bed placement information.

## 2022-06-14 NOTE — TELEPHONE ENCOUNTER
S: 06:43 ED MD call re: 31/M in [facility] ED for alcohol detox    B: Pt reports drinking 1 L vodka since relapsing on Friday. LD PTA, around 10PM. Breathalyser 0.132. Pt denies hx of seizures or DTs. Pt currently feels shaky in the ED. Pt denies other drug use. Hx of depression and anxiety - Pt denies SI and HI. No acute medical concerns. Pt ambulates independently.     A: Voluntary    R: Intake awaiting callback from ED after labs result and pt is medically cleared for detox     Will pass to oncoming shift

## 2022-06-14 NOTE — PHARMACY-ADMISSION MEDICATION HISTORY
Admission Medication History Completed by Pharmacy    See [facility] Admission Navigator for allergy information, preferred outpatient pharmacy, prior to admission medications and immunization status.     Medication History Sources:     Patient, SureScript, Care Everywhere    Changes made to PTA medication list (reason):    Added:   o None    Deleted:   o Tylenol 325 mg tabs - Take 325-650 mg PO Q4H PRN mild pain  o Alum & Mag Hydroxide-Simethicone (Maalox) 200-200-20 mg/5 mL suspension - Take 30 mL PO Q6H PRN indigestion  o Ibuprofen 200 mg tabs - Take 400 mg PO Q6H PRN mild pain    Changed:   o Melatonin 3 mg tabs, take 3 mg PO at bedtime -> take 3 mg PO at bedtime PRN    Additional Information:    Patient was a reliable historian    Patient stated that when he does need help falling asleep, he'll take melatonin earlier in the evening, and then take trazodone right before going to bed.    Patient takes loratadine for seasonal allergies in the spring    Patient does not specifically take the Thera-Vit-M multivitamin when at home, he takes multivitamins that can be purchased OTC at a drug store    Prior to Admission medications    Medication Sig Last Dose Taking? Auth Provider Long Term End Date   loratadine (CLARITIN) 10 MG tablet Take 10 mg by mouth as needed for allergies Past Month Yes Reported, Patient     melatonin 3 MG tablet Take 1 tablet (3 mg) by mouth every evening  Patient taking differently: Take 3 mg by mouth nightly as needed Past Week Yes Herlinda Teague MD     multivitamin w/minerals (THERA-VIT-M) tablet Take 1 tablet by mouth in the morning. Past Week Yes Herlinda Teague MD     prazosin (MINIPRESS) 1 MG capsule Take 1 capsule (1 mg) by mouth At Bedtime Past Week Yes Herlinda Teague MD Yes    traZODone (DESYREL) 50 MG tablet Take 1 tablet (50 mg) by mouth nightly as needed for sleep (may repeat after 60 minutes) Past Week Yes Herlinda Teague MD Yes        Date completed:  06/14/22    Medication history completed by: Gem Silva

## 2022-06-14 NOTE — ED TRIAGE NOTES
Patient has a history of alcohol abuse, here for detox. Last drink was vodka this evening. VSS.     Triage Assessment     Row Name 06/13/22 0737       Triage Assessment (Adult)    Airway WDL WDL       Respiratory WDL    Respiratory WDL WDL       Skin Circulation/Temperature WDL    Skin Circulation/Temperature WDL WDL       Cardiac WDL    Cardiac WDL WDL       Peripheral/Neurovascular WDL    Peripheral Neurovascular WDL WDL       Cognitive/Neuro/Behavioral WDL    Cognitive/Neuro/Behavioral WDL WDL

## 2022-06-15 ENCOUNTER — HOSPITAL ENCOUNTER (EMERGENCY)
Facility: CLINIC | Age: 32
Discharge: HOME | End: 2022-06-15
Attending: FAMILY MEDICINE
Payer: COMMERCIAL

## 2022-06-15 VITALS
BODY MASS INDEX: 21.94 KG/M2 | TEMPERATURE: 97.3 F | DIASTOLIC BLOOD PRESSURE: 69 MMHG | OXYGEN SATURATION: 97 % | WEIGHT: 162 LBS | RESPIRATION RATE: 16 BRPM | HEART RATE: 51 BPM | SYSTOLIC BLOOD PRESSURE: 117 MMHG | HEIGHT: 72 IN

## 2022-06-15 PROCEDURE — 99239 HOSP IP/OBS DSCHRG MGMT >30: CPT | Performed by: PSYCHIATRY & NEUROLOGY

## 2022-06-15 PROCEDURE — 250N000013 HC RX MED GY IP 250 OP 250 PS 637: Performed by: PSYCHIATRY & NEUROLOGY

## 2022-06-15 RX ADMIN — THIAMINE HCL TAB 100 MG 100 MG: 100 TAB at 08:42

## 2022-06-15 RX ADMIN — MULTIPLE VITAMINS W/ MINERALS TAB 1 TABLET: TAB at 08:42

## 2022-06-15 RX ADMIN — FOLIC ACID 1 MG: 1 TABLET ORAL at 08:41

## 2022-06-15 NOTE — PLAN OF CARE
Goal Outcome Evaluation:        Patient scored less than 8 for greater than 24 hours. He has required no medication for withdrawal for > 24 hours. Patient removed from detox status per unit Protocol.

## 2022-06-15 NOTE — PLAN OF CARE
Problem: Substance Withdrawal  Goal: Substance Withdrawal  Description: Signs and symptoms of listed problems will be absent or manageable.  Outcome: Ongoing, Progressing     Pt is monitored for alcohol withdrawal, MSSA of 3, 5. Pt did not receive medication for withdrawal symptoms during evening shift.     Pt visible in milieu and social with peers. Denied SI

## 2022-06-15 NOTE — PLAN OF CARE
Problem: Substance Withdrawal  Goal: Substance Withdrawal  Description: Signs and symptoms of listed problems will be absent or manageable.  Outcome: Ongoing, Progressing   Goal Outcome Evaluation:          Pt is on MSSA for ETOH withdrawal. Pt slept well. Denied withdrawal symptoms.     Scored a 2 and did not require any prn valium this shift. Last valium was given at 1300 on 6/14.

## 2022-06-15 NOTE — PROGRESS NOTES
"Patient discharged to home.  Reports being picked up by \"[employer].\" Patient escorted off the unit by psych associate, Popeye.        All patient belongings from room, locked bin and security were sent with patient. This RN went over discharge instructions, teachings, patient's labs, and discharge  recommendations with patient. This RN went over patient's prescribed medications. Patient verbalizes and demonstrates understanding of all teachings, denies thoughts of harm towards self or others.  Pt denies any current medical issues and denies any further questions. Pt is discharged at this time  "

## 2022-06-15 NOTE — PLAN OF CARE
Behavioral Team Discussion: (6/15/2022)    Continued Stay Criteria/Rationale: Patient admitted for alcohol withdrawal, complicated.  Plan: The following services will be provided to the patient; psychiatric assessment, medication management, therapeutic milieu, individual and group support, and skills groups.   Participants: 3A Provider: Dr. Vernon Garibay MD; 3A RN: Karin Fatima RN; 3A CM's: Carol Ruano.  Summary/Recommendation: Providers will assess today for treatment recommendations, discharge planning, and aftercare plans. CM will meet with pt for discharge planning.   Medical/Physical: Internal medicine consult to be completed 6/15/2022.  Precautions:   Behavioral Orders   Procedures     Code 1 - Restrict to Unit     Routine Programming     As clinically indicated     Status 15     Every 15 minutes.     Withdrawal precautions     Rationale for change in precautions or plan: N/A  Progress: No Change.    ASAM Dimension Scale Ratings:  Dimension 1: 3 Client tolerates and antonio with withdrawal discomfort poorly. Client has severe intoxication, such that the client endangers self or others, or intoxication has not abated with less intensive levels of services. Client displays severe signs and symptoms; or risk of severe, but manageable withdrawal; or withdrawal worsening despite detox at less intensive level.  Dimension 2: 1 Client tolerates and antonio with physical discomfort and is able to get the services that the client needs.  Dimension 3: 2 Client has difficulty with impulse control and lacks coping skills. Client has thoughts of suicide or harm to others without means; however, the thoughts may interfere with participation in some treatment activities. Client has difficulty functioning in significant life areas. Client has moderate symptoms of emotional, behavioral, or cognitive problems. Client is able to participate in most treatment activities.  Dimension 4: 2 Client displays verbal  compliance, but lacks consistent behaviors; has low motivation for change; and is passively involved in treatment.  Dimension 5: 4 No awareness of the negative impact of mental health problems or substance abuse. No coping skills to arrest mental health or addiction illnesses, or prevent relapse.  Dimension 6: 2 Client is engaged in structured, meaningful activity, but peers, family, significant other, and living environment are unsupportive, or there is criminal justice involvement by the client or among the client's peers, significant others, or in the client's living environment.

## 2022-06-15 NOTE — DISCHARGE SUMMARY
Chief Complaint:     Patient admitted due to alcohol dependence        Hospital Course:     Patient was admitted and observed and placed on detox protocal. He had been given one dose of Valium in the ER. He did not require any further dosing after that and did not have difficulty with detox. It was noted that he already had outpatient treatment in place at [facility]. He denied depression and was not interested in further treatment for depression. On discharge he was stable with no suicidal or homicidal thoughts and no medical complaints.        HPI:     Patient reports that he has been using alcohol since teen years but he believes that his drinking became problematic around age 25. He has been in CD treatment 6 times including twice inpatient. He is currently in outpatient program at [facility] where he has been participating for the past few weeks. His longest sober period was for 2 years from 3688-3803    Patient reports that he relapsed 5 days ago and has been using one liter of vodka per day since then.    Patient presented to the ER today because he was concerned he would have withdrawal if he stopped on his own.     Patient has used drugs in the past, but none recently and does not feel that he had a drug problem in the past.    Patient reports that he has been treated for depression in the past and has used different medications in the past including Prozac, Zoloft and Effexor. He does not feel that these medications were helpful. He reports that his mood has generally been better during sober periods. He denies substantial depression recently, but he does feel that he has been more 'detached'. He is not interested in anti-depressant treatment at this time.        Past Psychiatric History:     Patient reports that he has been treated for depression in the past and has used different medications in the past including Prozac, Zoloft and Effexor. He does not feel that these medications were helpful. He reports that  his mood has generally been better during sober periods. He denies substantial depression recently, but he does feel that he has been more 'detached'. He is not interested in anti-depressant treatment at this time.        Substance Use and History:     Patient reports that he has been using alcohol since teen years but he believes that his drinking became problematic around age 25. He has been in CD treatment 6 times including twice inpatient. He is currently in outpatient program at [facility] where he has been participating for the past few weeks. His longest sober period was for 2 years from [id number]        Past Medical History:   PAST MEDICAL HISTORY:   Past Medical History:   Diagnosis Date     Anxiety      Borderline personality disorder (H)      Depression        PAST SURGICAL HISTORY: No past surgical history on file.          Family History:   FAMILY HISTORY:   Family History   Problem Relation Age of Onset     Depression Mother      Anxiety Disorder Mother      Hypertension Father      Bipolar Disorder Brother      Patient does not know of chemical dependency history in family      Social History:   Please see the full psychosocial profile from the clinical treatment coordinator.   SOCIAL HISTORY:   Social History     Tobacco Use     Smoking status: Former Smoker     Packs/day: 0.50     Years: 10.00     Pack years: 5.00     Types: Other     Quit date: 3/2/2022     Years since quittin.2     Smokeless tobacco: Former User     Types: Chew     Tobacco comment: e cig   Substance Use Topics     Alcohol use: Not Currently     Comment: last drink was 2022     Patient completed HS and has a college degree. 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    Patient denies current romantic involvement and has no children    His parents live in [place] and he normally communicates with them.    Patient lives in a rented room and there are roommates there.         PTA Medications:      Medications Prior to Admission   Medication Sig Dispense Refill Last Dose     loratadine (CLARITIN) 10 MG tablet Take 10 mg by mouth as needed for allergies   Past Month     melatonin 3 MG tablet Take 1 tablet (3 mg) by mouth every evening (Patient taking differently: Take 3 mg by mouth nightly as needed) 30 tablet 0 Past Week     multivitamin w/minerals (THERA-VIT-M) tablet Take 1 tablet by mouth in the morning. 30 tablet 0 Past Week     prazosin (MINIPRESS) 1 MG capsule Take 1 capsule (1 mg) by mouth At Bedtime 30 capsule 0 Past Week     traZODone (DESYREL) 50 MG tablet Take 1 tablet (50 mg) by mouth nightly as needed for sleep (may repeat after 60 minutes) 30 tablet 0 Past Week            Current Medications:       folic acid  1 mg Oral Daily     multivitamin w/minerals  1 tablet Oral Daily     prazosin  1 mg Oral At Bedtime     thiamine  100 mg Oral Daily     acetaminophen, alum & mag hydroxide-simethicone, diazepam, hydrOXYzine, loperamide, melatonin, OLANZapine **OR** OLANZapine, ondansetron, polyethylene glycol, traZODone         Allergies:   No Known Allergies       Labs:     Recent Results (from the past 48 hour(s))   Alcohol breath test POCT    Collection Time: 06/14/22  2:01 AM   Result Value Ref Range    Alcohol Breath Test 0.132 (A) 0.00 - 0.01   Drug abuse screen 1 urine (ED)    Collection Time: 06/14/22  6:10 AM   Result Value Ref Range    Amphetamines Urine Screen Negative Screen Negative    Barbiturates Urine Screen Negative Screen Negative    Benzodiazepines Urine Screen Negative Screen Negative    Cannabinoids Urine Screen Negative Screen Negative    Cocaine Urine Screen Negative Screen Negative    Opiates Urine Screen Negative Screen Negative   Comprehensive metabolic panel    Collection Time: 06/14/22  8:36 AM   Result Value Ref Range    Sodium 141 133 - 144 mmol/L    Potassium 4.0 3.4 - 5.3 mmol/L    Chloride 106 94 - 109 mmol/L    Carbon Dioxide (CO2) 27 20 - 32 mmol/L    Anion Gap 8 3 -  14 mmol/L    Urea Nitrogen 18 7 - 30 mg/dL    Creatinine 0.91 0.66 - 1.25 mg/dL    Calcium 8.8 8.5 - 10.1 mg/dL    Glucose 106 (H) 70 - 99 mg/dL    Alkaline Phosphatase 74 40 - 150 U/L    AST 23 0 - 45 U/L    ALT 30 0 - 70 U/L    Protein Total 6.8 6.8 - 8.8 g/dL    Albumin 3.6 3.4 - 5.0 g/dL    Bilirubin Total 1.2 0.2 - 1.3 mg/dL    GFR Estimate >90 >60 mL/min/1.73m2   Lipase    Collection Time: 06/14/22  8:36 AM   Result Value Ref Range    Lipase 119 73 - 393 U/L   CBC with platelets and differential    Collection Time: 06/14/22  8:36 AM   Result Value Ref Range    WBC Count 5.9 4.0 - 11.0 10e3/uL    RBC Count 4.11 (L) 4.40 - 5.90 10e6/uL    Hemoglobin 13.7 13.3 - 17.7 g/dL    Hematocrit 38.7 (L) 40.0 - 53.0 %    MCV 94 78 - 100 fL    MCH 33.3 (H) 26.5 - 33.0 pg    MCHC 35.4 31.5 - 36.5 g/dL    RDW 11.5 10.0 - 15.0 %    Platelet Count 253 150 - 450 10e3/uL    % Neutrophils 57 %    % Lymphocytes 30 %    % Monocytes 9 %    % Eosinophils 3 %    % Basophils 1 %    % Immature Granulocytes 0 %    NRBCs per 100 WBC 0 <1 /100    Absolute Neutrophils 3.4 1.6 - 8.3 10e3/uL    Absolute Lymphocytes 1.8 0.8 - 5.3 10e3/uL    Absolute Monocytes 0.6 0.0 - 1.3 10e3/uL    Absolute Eosinophils 0.2 0.0 - 0.7 10e3/uL    Absolute Basophils 0.0 0.0 - 0.2 10e3/uL    Absolute Immature Granulocytes 0.0 <=0.4 10e3/uL    Absolute NRBCs 0.0 10e3/uL   Asymptomatic COVID-19 Virus (Coronavirus) by PCR Nasopharyngeal    Collection Time: 06/14/22  8:37 AM    Specimen: Nasopharyngeal; Swab   Result Value Ref Range    SARS CoV2 PCR Negative Negative          Physical Exam:     /70   Pulse (!) 45   Temp 97.9  F (36.6  C) (Temporal)   Resp 16   Ht 1.829 m (6')   Wt 73.5 kg (162 lb)   SpO2 97%   BMI 21.97 kg/m    Weight is 162 lbs 0 oz  Body mass index is 21.97 kg/m .    Physical Exam:  Gen: No acute distress  HEENT: EOMI, no nystagmus or scleral icterus, moist mucous membranes  Skin: No diaphoresis or rash         Physical ROS:     Review  of Systems is otherwise negative including HEENT, CV, Respiratory, GI, , Musculoskeletal, Neurologic, Dermatologic, Endocrine, Immunological, Constitutional systems           Mental Status Exam:     Mental Status  Patient is casually dressed  Hygiene good  Speech fluent  Thought Process logical  Thought Content:  No suicidal ideation currently,    No homicidal ideation,   No ideas of reference,    No loose associations,    No auditory hallucinations,     No visual hallucinations   No delusions  Psychomotor: No agitation or slowing  Cognition:  Alert and oriented to time place and person  Attention good  Concentration good  Memory normal including recent and remote memory  Mood:  euthymic  Affect: mood congruent  Judgement: normal  Eye contact good  Cooperation good  Language normal  Fund of knowledge normal  Musculoskeletal normal gait with no abnormal movements         Admission Diagnoses:   Alcohol dependence (H)    Patient Active Problem List    Diagnosis Date Noted     Alcohol dependence (H) 06/14/2022     Priority: Medium     Chemical dependency (H) 04/13/2022     Priority: Medium     Alcohol abuse 04/10/2022     Priority: Medium     Borderline personality disorder (H) 10/03/2019     Priority: Medium     Atypical eating disorder 07/27/2019     Priority: Medium     Recurrent major depression in partial remission (H) 04/23/2019     Priority: Medium     Alcohol use disorder, severe, in sustained remission, dependence (H) 10/31/2018     Priority: Medium     Cannabis use disorder, severe, in sustained remission (H) 10/31/2018     Priority: Medium     Generalized anxiety disorder 10/03/2018     Priority: Medium     Moderate episode of recurrent major depressive disorder (H) 10/03/2018     Priority: Medium              Assessment:     Patient presents with Alcohol dependence. He has completed detox and is safe for discharge today         Plan:     Legal: voluntary      Medication: no anti-depressant treatment for  now    Consults: Hospitalist will be consulted if medical issues arise      Multidisciplinary Interventions:  to gather collateral information, coordinate care with outpatient providers and begin follow up planning      Disposition:Resume [facility]    More than 35 minutes spent on this visit with more than 50% time spent on coordination of care with staff, reviewing medical record, psychoeducation, providing supportive therapy regarding coping with chronic mental illness, entering orders and preparing documentation for the visit      Vernon Garibay MD

## 2022-06-15 NOTE — DISCHARGE INSTRUCTIONS
Behavioral Discharge Planning and Instructions  THANK YOU FOR CHOOSING THE [facility]  3A  [phone]    Summary: You were admitted to Station 3A on 6/14/2022 for detoxification from Alcohol.  A medical exam was performed that included lab work. You have met with a  and opted to return home, continue working with [facility]. Please take care and make your recovery a priority!    Main Diagnosis:  Per  Dr. Garibay  Alcohol dependence     Major Treatments, Procedures and Findings:  You were detoxed from alcohol. You have met with a  to develop a treatment plan for discharge.  You have had labs drawn and a copy of those labs will be sent home with you. Your alcohol withdrawal is complete and you are being discharged today.  Please bring your lab results with to your follow up doctor appointment.  Make your recovery a priority!                        Symptoms to Report:  If you experience more anxiety, confusion, sleeplessness, deep sadness or thoughts of suicide, notify your treatment team or notify your primary care physician. IF ANY OF THE SYMPTOMS YOU ARE EXPERIENCING ARE A MEDICAL EMERGENCY CALL 911 IMMEDIATELY.     Lifestyle Adjustment: Adjust your lifestyle to get enough sleep, relaxation, exercise and  good nutrition. Continue to develop healthy coping skills to decrease stress and promote a sober living environment. Do not use alcohol, illegal drugs or addictive medications other than what is currently prescribed. AA, NA, and  Sponsor are excellent resources for support.     Disposition: Home    Treatment Follow-Up:  [facility]  Address: [address] [place] [address]  Phone: [phone]  About: Located in the heart of the recovery community, [facility] has served more than 40,000 individuals over the past fifty plus years. [facility] utilizes evidence-based practices designed to treat co-occurring substance use and mental health  disorders. These include but are not limited to: group and individual counseling, gender-sensitive programming, individualized lengths of stay, recovery management skills, a connection to community resources and trauma work (i.e. Pro-longed exposure therapy, Accelerated Resolution Therapy, trauma skills groups).   Counselor Zhanna: [phone]    Primary Provider:    [facility]    [address]    [address] [address]    [phone]         DISCHARGE RESOURCES:  -SMART Recovery - self management for addiction recovery:  www.smartrecovery.org    -Pathways ~ A Health Crisis Resource & Support Center: 427.303.9830.  -[facility] [phone]   -[facility] Behavioral Intake [phone] or [phone].  -Crisis Intervention: 839.334.8217 or 157-983-8109 (TTY: 758.897.2693).  Call anytime.  -Suicide Awareness Voices of Education (SAVE) (www.save.org): 490-609-OLKQ (1301)  -National Suicide Prevention Line (www.mentalhealthmn.org): 881-150-JKKS (0640)  -National [place] on Mental Illness (www.[place].neville.org): 764.749.3527 or 031-088-0736.  -Evry6hzib: text the word LIFE to 47278 for immediate support and crisis intervention  -Mental Health Consumer/Survivor Network of [place] (www.mhcsn.net): 903.335.9459 or 339-583-3614  -Mental Health Association of [place] (www.mentalhealth.org): 751.113.1560 or 481-133-1189     -Substance Abuse and Mental Health Services (www.samhsa.gov)  -Harm Reduction Coalition (www. Harmreduction.org)  -www.prescribetoprevent.org or http://prescribetoprevent.org/video  -Poison control 9-727-284-7362     Sober Support Group Information:  AA/NA & Sponsor/Support  -Alcoholics Anonymous (www.alcoholics-anonymous.org): for local information 24 hours/day  -AA Intergroup service office in [place] (http://www.aastpaul.org/) 552-199-1983  -AA Intergroup service office in [facility]: [phone].  (http://www.aamin[place].org/)  -Narcotics Anonymous (www.nam[place].org) (745) 371-8637   **Sober Fun Activities: www.soberMaestroDevactivities.Women of Coffee/[facility]//[facility] Connection ([facility])  [facility] connects people seeking recovery to resources that help foster and sustain long-term recovery.  Whether you are seeking resources for treatment, transportation, housing, job training, education, health care or other pathways to recovery, [facility] is a great place to start.  [phone].  Www.[facility].org    General Medication Instructions:   See your medication sheet(s) for instructions.   Take all medicines as directed.  Make no changes unless your doctor suggests them.   Go to all your doctor visits.  Be sure to have all your required lab tests. This way, your medicines can be refilled on time.  Do not use any drugs not prescribed by your provider.  AA/NA and Sponsors are excellent resources for support  Avoid alcohol.    Please Note:  If you have any questions at anytime after you are discharged please call the [facility] [place] detox unit 3AW unit at [phone].    [facility], Behavioral Intake [phone]    Please take this discharge folder with you to all your follow up appointments, it contains your lab results, diagnosis, medication list and discharge recommendations.      THANK YOU FOR CHOOSING THE [facility]

## 2022-06-15 NOTE — PROGRESS NOTES
Met with pt to discuss discharge planning. Pt reports he is currently living in an Air BnB in [place] and he likes this situation. Pt reports he has been attending [address] for the past 3-4 weeks. Pt reports he has talked with his counselor. Pt also signed [facility] for [facility].     Called pt's Critical access hospital counselor Zhanna ([phone]), left  offering coordination of care if needed. AVS updated.     Carol Ruano, LPCC, LADC

## 2022-06-16 ENCOUNTER — HOSPITAL ENCOUNTER (EMERGENCY)
Facility: CLINIC | Age: 32
Discharge: HOME | End: 2022-06-17
Attending: EMERGENCY MEDICINE | Admitting: EMERGENCY MEDICINE
Payer: COMMERCIAL

## 2022-06-16 ENCOUNTER — TELEPHONE (OUTPATIENT)
Dept: BEHAVIORAL HEALTH | Facility: CLINIC | Age: 32
End: 2022-06-16

## 2022-06-16 ENCOUNTER — PATIENT OUTREACH (OUTPATIENT)
Dept: CARE COORDINATION | Facility: CLINIC | Age: 32
End: 2022-06-16
Payer: COMMERCIAL

## 2022-06-16 VITALS
OXYGEN SATURATION: 98 % | HEART RATE: 83 BPM | TEMPERATURE: 97.3 F | SYSTOLIC BLOOD PRESSURE: 120 MMHG | RESPIRATION RATE: 16 BRPM | DIASTOLIC BLOOD PRESSURE: 76 MMHG

## 2022-06-16 DIAGNOSIS — Z71.89 OTHER SPECIFIED COUNSELING: ICD-10-CM

## 2022-06-16 DIAGNOSIS — F10.10 ALCOHOL ABUSE: ICD-10-CM

## 2022-06-16 LAB
ALBUMIN SERPL-MCNC: 4 G/DL (ref 3.4–5)
ALCOHOL BREATH TEST: 0.21 (ref 0–0.01)
ALP SERPL-CCNC: 83 U/L (ref 40–150)
ALT SERPL W P-5'-P-CCNC: 32 U/L (ref 0–70)
AMPHETAMINES UR QL SCN: NORMAL
ANION GAP SERPL CALCULATED.3IONS-SCNC: 9 MMOL/L (ref 3–14)
AST SERPL W P-5'-P-CCNC: 24 U/L (ref 0–45)
BARBITURATES UR QL: NORMAL
BASOPHILS # BLD AUTO: 0.1 10E3/UL (ref 0–0.2)
BASOPHILS NFR BLD AUTO: 1 %
BENZODIAZ UR QL: NORMAL
BILIRUB SERPL-MCNC: 0.6 MG/DL (ref 0.2–1.3)
BUN SERPL-MCNC: 18 MG/DL (ref 7–30)
CALCIUM SERPL-MCNC: 8.6 MG/DL (ref 8.5–10.1)
CANNABINOIDS UR QL SCN: NORMAL
CHLORIDE BLD-SCNC: 110 MMOL/L (ref 94–109)
CO2 SERPL-SCNC: 25 MMOL/L (ref 20–32)
COCAINE UR QL: NORMAL
CREAT SERPL-MCNC: 0.98 MG/DL (ref 0.66–1.25)
EOSINOPHIL # BLD AUTO: 0.2 10E3/UL (ref 0–0.7)
EOSINOPHIL NFR BLD AUTO: 3 %
ERYTHROCYTE [DISTWIDTH] IN BLOOD BY AUTOMATED COUNT: 11.3 % (ref 10–15)
GFR SERPL CREATININE-BSD FRML MDRD: >90 ML/MIN/1.73M2
GLUCOSE BLD-MCNC: 104 MG/DL (ref 70–99)
HCT VFR BLD AUTO: 41.3 % (ref 40–53)
HGB BLD-MCNC: 15 G/DL (ref 13.3–17.7)
IMM GRANULOCYTES # BLD: 0 10E3/UL
IMM GRANULOCYTES NFR BLD: 0 %
LYMPHOCYTES # BLD AUTO: 2.7 10E3/UL (ref 0.8–5.3)
LYMPHOCYTES NFR BLD AUTO: 36 %
MCH RBC QN AUTO: 33.9 PG (ref 26.5–33)
MCHC RBC AUTO-ENTMCNC: 36.3 G/DL (ref 31.5–36.5)
MCV RBC AUTO: 93 FL (ref 78–100)
MONOCYTES # BLD AUTO: 0.4 10E3/UL (ref 0–1.3)
MONOCYTES NFR BLD AUTO: 6 %
NEUTROPHILS # BLD AUTO: 4.1 10E3/UL (ref 1.6–8.3)
NEUTROPHILS NFR BLD AUTO: 54 %
NRBC # BLD AUTO: 0 10E3/UL
NRBC BLD AUTO-RTO: 0 /100
OPIATES UR QL SCN: NORMAL
PLATELET # BLD AUTO: 260 10E3/UL (ref 150–450)
POTASSIUM BLD-SCNC: 4.1 MMOL/L (ref 3.4–5.3)
PROT SERPL-MCNC: 7.8 G/DL (ref 6.8–8.8)
RBC # BLD AUTO: 4.42 10E6/UL (ref 4.4–5.9)
SARS-COV-2 RNA RESP QL NAA+PROBE: NEGATIVE
SODIUM SERPL-SCNC: 144 MMOL/L (ref 133–144)
WBC # BLD AUTO: 7.5 10E3/UL (ref 4–11)

## 2022-06-16 PROCEDURE — 250N000013 HC RX MED GY IP 250 OP 250 PS 637: Performed by: EMERGENCY MEDICINE

## 2022-06-16 PROCEDURE — 36415 COLL VENOUS BLD VENIPUNCTURE: CPT | Performed by: EMERGENCY MEDICINE

## 2022-06-16 PROCEDURE — 80307 DRUG TEST PRSMV CHEM ANLYZR: CPT | Performed by: EMERGENCY MEDICINE

## 2022-06-16 PROCEDURE — 87635 SARS-COV-2 COVID-19 AMP PRB: CPT | Performed by: EMERGENCY MEDICINE

## 2022-06-16 PROCEDURE — 85025 COMPLETE CBC W/AUTO DIFF WBC: CPT | Performed by: EMERGENCY MEDICINE

## 2022-06-16 PROCEDURE — 82947 ASSAY GLUCOSE BLOOD QUANT: CPT | Performed by: EMERGENCY MEDICINE

## 2022-06-16 PROCEDURE — C9803 HOPD COVID-19 SPEC COLLECT: HCPCS | Performed by: EMERGENCY MEDICINE

## 2022-06-16 PROCEDURE — 99283 EMERGENCY DEPT VISIT LOW MDM: CPT | Performed by: EMERGENCY MEDICINE

## 2022-06-16 PROCEDURE — 99284 EMERGENCY DEPT VISIT MOD MDM: CPT | Performed by: EMERGENCY MEDICINE

## 2022-06-16 PROCEDURE — 82075 ASSAY OF BREATH ETHANOL: CPT | Performed by: EMERGENCY MEDICINE

## 2022-06-16 PROCEDURE — 82435 ASSAY OF BLOOD CHLORIDE: CPT | Performed by: EMERGENCY MEDICINE

## 2022-06-16 RX ORDER — DIAZEPAM 5 MG
5-20 TABLET ORAL EVERY 30 MIN PRN
Status: DISCONTINUED | OUTPATIENT
Start: 2022-06-16 | End: 2022-06-17 | Stop reason: HOSPADM

## 2022-06-16 RX ORDER — FOLIC ACID 1 MG/1
1 TABLET ORAL ONCE
Status: COMPLETED | OUTPATIENT
Start: 2022-06-16 | End: 2022-06-16

## 2022-06-16 RX ORDER — MULTIPLE VITAMINS W/ MINERALS TAB 9MG-400MCG
1 TAB ORAL ONCE
Status: COMPLETED | OUTPATIENT
Start: 2022-06-16 | End: 2022-06-16

## 2022-06-16 RX ADMIN — FOLIC ACID 1 MG: 1 TABLET ORAL at 15:57

## 2022-06-16 RX ADMIN — DIAZEPAM 10 MG: 5 TABLET ORAL at 23:25

## 2022-06-16 RX ADMIN — DIAZEPAM 10 MG: 5 TABLET ORAL at 19:58

## 2022-06-16 RX ADMIN — THIAMINE HCL TAB 100 MG 100 MG: 100 TAB at 15:57

## 2022-06-16 RX ADMIN — MULTIPLE VITAMINS W/ MINERALS TAB 1 TABLET: TAB at 15:57

## 2022-06-16 ASSESSMENT — ENCOUNTER SYMPTOMS: HALLUCINATIONS: 0

## 2022-06-16 NOTE — ED NOTES
AIDET: done    Security ( Screen ): done    Belongings:  [ 1  ] BAG(s)    CELLPHONE  [ YES ]  WALLET   [ YES ]    >w/pt: cellphone, clothes      >locker / rack: shoes, wallet      >security:

## 2022-06-16 NOTE — PROGRESS NOTES
Clinic Care Coordination Contact    Background: Care Coordination referral placed from [facility] discharge report for reason of patient meeting criteria for a TCM outreach call by [facility] team.    Assessment: Upon chart review, CCRC Team member will cancel/close the referral for TCM outreach due to reason below:    Pt left without being seen    Plan: Care Coordination referral for TCM outreach canceled.    RIYA Gonzales  [facility]

## 2022-06-16 NOTE — ED TRIAGE NOTES
Triage Assessment     Row Name 06/16/22 2507       Triage Assessment (Adult)    Airway WDL WDL       Respiratory WDL    Respiratory WDL WDL       Skin Circulation/Temperature WDL    Skin Circulation/Temperature WDL WDL       Cardiac WDL    Cardiac WDL WDL       Peripheral/Neurovascular WDL    Peripheral Neurovascular WDL WDL       Cognitive/Neuro/Behavioral WDL    Cognitive/Neuro/Behavioral WDL WDL

## 2022-06-16 NOTE — ED PROVIDER NOTES
[facility] EMERGENCY DEPARTMENT ([facility])    22        History     Chief Complaint   Patient presents with     Alcohol Intoxication     Detox: etoh: 1 liter: no hx of seizure; last drink prior to arrival     The history is provided by the patient and medical records.     Cong Tuttle is a 31 year old male with a history of  alcohol use disorder, borderline personality disorder, anxiety, and depression who presents to the Emergency Department seeking detox from alcohol. Patient reports he has been drinking 1L vodka daily for the past 7-10 days. He states prior to this he was sober for a month. Patient notes his drinking has been problematic for 2 years. Patient denies recent drug use. No SI, HI, or hallucinations. He endorses history of DTs.     Past Medical History  Past Medical History:   Diagnosis Date     Anxiety      Borderline personality disorder (H)      Depression      No past surgical history on file.  loratadine (CLARITIN) 10 MG tablet  melatonin 3 MG tablet  multivitamin w/minerals (THERA-VIT-M) tablet  prazosin (MINIPRESS) 1 MG capsule  traZODone (DESYREL) 50 MG tablet      No Known Allergies  Family History  Family History   Problem Relation Age of Onset     Depression Mother      Anxiety Disorder Mother      Hypertension Father      Bipolar Disorder Brother      Social History   Social History     Tobacco Use     Smoking status: Former Smoker     Packs/day: 0.50     Years: 10.00     Pack years: 5.00     Types: Other     Quit date: 3/2/2022     Years since quittin.2     Smokeless tobacco: Former User     Types: Chew     Tobacco comment: e cig   Vaping Use     Vaping Use: Former   Substance Use Topics     Alcohol use: Not Currently     Comment: last drink was 2022     Drug use: Not Currently      Past medical history, past surgical history, medications, allergies, family history, and social history were reviewed with the patient. No additional pertinent items.       Review  of Systems   Psychiatric/Behavioral: Negative for hallucinations and suicidal ideas.   All other systems reviewed and are negative.    A complete review of systems was performed with pertinent positives and negatives noted in the HPI, and all other systems negative.    Physical Exam   BP: 133/81  Pulse: 83  Temp: 98.7  F (37.1  C)  Resp: 16  SpO2: 98 %  Physical Exam  Vitals and nursing note reviewed.   Constitutional:       General: He is not in acute distress.     Appearance: Normal appearance. He is well-developed and normal weight. He is not ill-appearing or diaphoretic.   HENT:      Head: Normocephalic and atraumatic.      Nose: Nose normal.   Eyes:      General: No scleral icterus.     Conjunctiva/sclera: Conjunctivae normal.   Cardiovascular:      Rate and Rhythm: Normal rate.   Pulmonary:      Effort: Pulmonary effort is normal. No respiratory distress.      Breath sounds: No stridor.   Abdominal:      General: There is no distension.   Musculoskeletal:         General: No deformity or signs of injury. Normal range of motion.      Cervical back: Normal range of motion and neck supple. No rigidity.   Skin:     General: Skin is warm and dry.      Coloration: Skin is not jaundiced or pale.      Findings: No rash.   Neurological:      General: No focal deficit present.      Mental Status: He is alert and oriented to person, place, and time.   Psychiatric:         Attention and Perception: He is inattentive.         Mood and Affect: Mood is anxious.         Speech: Speech normal. Speech is not slurred.         Behavior: Behavior normal. Behavior is cooperative.         Thought Content: Thought content normal. Thought content does not include homicidal or suicidal ideation.         Judgment: Judgment is impulsive.         ED Course   3:02 PM  The patient was seen and examined by Joelle Martinez MD in HW02.      Procedures                     Results for orders placed or performed during the hospital encounter of  06/16/22   Alcohol breath test POCT     Status: Abnormal   Result Value Ref Range    Alcohol Breath Test 0.214 (A) 0.00 - 0.01   CBC with platelets differential     Status: None ()    Narrative    The following orders were created for panel order CBC with platelets differential.  Procedure                               Abnormality         Status                     ---------                               -----------         ------                     CBC with platelets and d...[[id number]]                                                   Please view results for these tests on the individual orders.     Medications   diazepam (VALIUM) tablet 5-20 mg (has no administration in time range)   thiamine (B-1) tablet 100 mg (100 mg Oral Given 6/16/22 1557)   folic acid (FOLVITE) tablet 1 mg (1 mg Oral Given 6/16/22 1557)   multivitamin w/minerals (THERA-VIT-M) tablet 1 tablet (1 tablet Oral Given 6/16/22 1557)        Assessments & Plan (with Medical Decision Making)   Cong Tuttle is a 31 year old male with a history of  alcohol use disorder, borderline personality disorder, anxiety, and depression who presents to the Emergency Department seeking detox from alcohol.    Ddx: alcohol dependence/abuse, acute withdrawal, acute intoxication, polysubstance abuse, alcoholic hepatitis    Patient with BAL 0.214 on arrival. Labs sent. Patient voluntary for detox. Bed on hold in detox. Patient signed out to Dr. Melendez at shift change. Dispo pending labs and medical clearance. Call back to intake for clinical.        I have reviewed the nursing notes. I have reviewed the findings, diagnosis, plan and need for follow up with the patient.    New Prescriptions    No medications on file       Final diagnoses:   Alcohol abuse     I, Chanda Sauceda, am serving as a trained medical scribe to document services personally performed by Joelle Martinez MD, based on the provider's statements to me.      IJoelle MD, was physically present  and have reviewed and verified the accuracy of this note documented by Chanda Sauceda.    --  Joelle Martinez MD  [facility] EMERGENCY DEPARTMENT  6/16/2022     Joelle Martinez MD  06/16/22 9633

## 2022-06-17 PROCEDURE — 250N000011 HC RX IP 250 OP 636: Performed by: EMERGENCY MEDICINE

## 2022-06-17 RX ORDER — ONDANSETRON 4 MG/1
4 TABLET, ORALLY DISINTEGRATING ORAL ONCE
Status: COMPLETED | OUTPATIENT
Start: 2022-06-17 | End: 2022-06-17

## 2022-06-17 RX ADMIN — ONDANSETRON 4 MG: 4 TABLET, ORALLY DISINTEGRATING ORAL at 06:32

## 2022-06-17 NOTE — DISCHARGE INSTRUCTIONS
Home.  Call your counselor this morning who recommended detox for plan to treatment program.  Follow up with your primary MD.  Avoid alcohol use and stay sober as to be eligible for new treatment program.  Return if any concerns.

## 2022-06-17 NOTE — TELEPHONE ENCOUNTER
S: 11pm- ED MD called to request detox bed for 31 yrs old male in the [facility].     B: Pt has been here 8 hours.  He came in with a 0.214 breathalyzer.  Pt reports drinking 1L of vodka daily for the last 10 days.  No other drugs use.  Denies SI/HI.  He has a hx of DTs and no hx of w/d seizures.  Pt is on [facility] protocol and managed with valium in ED.     Pt has no chronic medical illness.  He walks and talks independently.  Pt is medically cleared.     COVID: negative  UTOX: negative  CBC: WNL  CMP: WNL  VITALS: stable    A: Vol.    R: Patient cleared and ready for behavioral bed placement: Yes     11:25pm- Intake was notified that Pt was discharged on unit yesterday.  Pt does not meet criteria for detox inpt.     ED MD notified.

## 2022-06-17 NOTE — ED PROVIDER NOTES
Patient seen by Dr. Martinez reviewed her note.  Signed out to me initial plan was for admission to 3 a detox once labs are back.  I reviewed the labs etc. all are stable otherwise.  I talked intake initially planning to admit the patient but then intake called back and stated that he was just discharged from detox yesterday morning.  He only required 1 dose of Valium for a 2 to 3-day detox.  At this point there is no indications for detox.  Discussed at length with patient also this point he did talk to his counselor who stated that he needed to go to detox because of trying to get him into a new treatment program which would require him to be have gone through detox and be sober.  At this point patient will be allowed to his sleep till morning here in the ER which have informed the nurse also and then he can contact his counselor this morning making sure that he has not drank and can stay sober patient is agree with this plan.  If at any point he wants to leave he certainly can leave that he appears clinically stable and sober.      This note was created at least in part by the use of dragon voice dictation system. Inadvertent typographical errors may still exist.  Irvin Melendez MD.    Patient evaluated in the emergency department during the COVID-19 pandemic period. Careful attention to patients safety was addressed throughout the evaluation. Evaluation and treatment management was initiated with disposition made efficiently and appropriate as possible to minimize any risk of potential exposure to patient during this evaluation.       Irvin Melendez MD  06/16/22 7245

## 2022-06-17 NOTE — ED NOTES
Emergency Department Patient Sign-out       Brief HPI:  This is a 31 year old male signed out to me by Dr. Melendez.  See initial ED Provider note for details of the presentation.            Significant Events prior to my assuming care: Patient discharged yesterday from detox.      Exam:   Patient Vitals for the past 24 hrs:   BP Temp Temp src Pulse Resp SpO2   06/16/22 1950 120/76 97.3  F (36.3  C) -- 83 16 98 %   06/16/22 1715 129/83 97.4  F (36.3  C) -- 61 16 97 %   06/16/22 1432 133/81 98.7  F (37.1  C) Oral 83 16 98 %           ED RESULTS:   Results for orders placed or performed during the hospital encounter of 06/16/22 (from the past 24 hour(s))   Alcohol breath test POCT     Status: Abnormal    Collection Time: 06/16/22  2:40 PM   Result Value Ref Range    Alcohol Breath Test 0.214 (A) 0.00 - 0.01   CBC with platelets differential     Status: Abnormal    Collection Time: 06/16/22  3:54 PM    Narrative    The following orders were created for panel order CBC with platelets differential.  Procedure                               Abnormality         Status                     ---------                               -----------         ------                     CBC with platelets and d...[[id number]]  Abnormal            Final result                 Please view results for these tests on the individual orders.   Comprehensive metabolic panel     Status: Abnormal    Collection Time: 06/16/22  3:54 PM   Result Value Ref Range    Sodium 144 133 - 144 mmol/L    Potassium 4.1 3.4 - 5.3 mmol/L    Chloride 110 (H) 94 - 109 mmol/L    Carbon Dioxide (CO2) 25 20 - 32 mmol/L    Anion Gap 9 3 - 14 mmol/L    Urea Nitrogen 18 7 - 30 mg/dL    Creatinine 0.98 0.66 - 1.25 mg/dL    Calcium 8.6 8.5 - 10.1 mg/dL    Glucose 104 (H) 70 - 99 mg/dL    Alkaline Phosphatase 83 40 - 150 U/L    AST 24 0 - 45 U/L    ALT 32 0 - 70 U/L    Protein Total 7.8 6.8 - 8.8 g/dL    Albumin 4.0 3.4 - 5.0 g/dL    Bilirubin Total 0.6 0.2 - 1.3 mg/dL     GFR Estimate >90 >60 mL/min/1.73m2   Asymptomatic COVID-19 Virus (Coronavirus) by PCR Nasopharyngeal     Status: Normal    Collection Time: 06/16/22  3:54 PM    Specimen: Nasopharyngeal; Swab   Result Value Ref Range    SARS CoV2 PCR Negative Negative    Narrative    Testing was performed using the zoey  SARS-CoV-2 & Influenza A/B Assay on the zoey  Lexi  System.  This test should be ordered for the detection of SARS-COV-2 in individuals who meet SARS-CoV-2 clinical and/or epidemiological criteria. Test performance is unknown in asymptomatic patients.  This test is for in vitro diagnostic use under the FDA EUA for laboratories certified under CLIA to perform moderate and/or high complexity testing. This test has not been FDA cleared or approved.  A negative test does not rule out the presence of PCR inhibitors in the specimen or target RNA in concentration below the limit of detection for the assay. The possibility of a false negative should be considered if the patient's recent exposure or clinical presentation suggests COVID-19.  [facility] are certified under the Clinical Laboratory Improvement Amendments of 1988 (CLIA-88) as qualified to perform moderate and/or high complexity laboratory testing.   CBC with platelets and differential     Status: Abnormal    Collection Time: 06/16/22  3:54 PM   Result Value Ref Range    WBC Count 7.5 4.0 - 11.0 10e3/uL    RBC Count 4.42 4.40 - 5.90 10e6/uL    Hemoglobin 15.0 13.3 - 17.7 g/dL    Hematocrit 41.3 40.0 - 53.0 %    MCV 93 78 - 100 fL    MCH 33.9 (H) 26.5 - 33.0 pg    MCHC 36.3 31.5 - 36.5 g/dL    RDW 11.3 10.0 - 15.0 %    Platelet Count 260 150 - 450 10e3/uL    % Neutrophils 54 %    % Lymphocytes 36 %    % Monocytes 6 %    % Eosinophils 3 %    % Basophils 1 %    % Immature Granulocytes 0 %    NRBCs per 100 WBC 0 <1 /100    Absolute Neutrophils 4.1 1.6 - 8.3 10e3/uL    Absolute Lymphocytes 2.7 0.8 - 5.3 10e3/uL    Absolute Monocytes 0.4 0.0 - 1.3  10e3/uL    Absolute Eosinophils 0.2 0.0 - 0.7 10e3/uL    Absolute Basophils 0.1 0.0 - 0.2 10e3/uL    Absolute Immature Granulocytes 0.0 <=0.4 10e3/uL    Absolute NRBCs 0.0 10e3/uL   Urine Drugs of Abuse Screen     Status: Normal    Collection Time: 06/16/22  4:21 PM    Narrative    The following orders were created for panel order Urine Drugs of Abuse Screen.  Procedure                               Abnormality         Status                     ---------                               -----------         ------                     Drug abuse screen 1 urin...[[id number]]  Normal              Final result                 Please view results for these tests on the individual orders.   Drug abuse screen 1 urine (ED)     Status: Normal    Collection Time: 06/16/22  4:21 PM   Result Value Ref Range    Amphetamines Urine Screen Negative Screen Negative    Barbiturates Urine Screen Negative Screen Negative    Benzodiazepines Urine Screen Negative Screen Negative    Cannabinoids Urine Screen Negative Screen Negative    Cocaine Urine Screen Negative Screen Negative    Opiates Urine Screen Negative Screen Negative       ED MEDICATIONS:   Medications   diazepam (VALIUM) tablet 5-20 mg (10 mg Oral Given 6/16/22 0845)   thiamine (B-1) tablet 100 mg (100 mg Oral Given 6/16/22 1557)   folic acid (FOLVITE) tablet 1 mg (1 mg Oral Given 6/16/22 1557)   multivitamin w/minerals (THERA-VIT-M) tablet 1 tablet (1 tablet Oral Given 6/16/22 1557)         Impression:    ICD-10-CM    1. Alcohol abuse  F10.10        Plan:    Discharge in AM, will contact counselor for chem dep resources..        MD Sandor Whitt, Jameson Randall MD  06/17/22 010

## 2022-07-23 ENCOUNTER — HEALTH MAINTENANCE LETTER (OUTPATIENT)
Age: 32
End: 2022-07-23

## 2022-10-01 ENCOUNTER — HEALTH MAINTENANCE LETTER (OUTPATIENT)
Age: 32
End: 2022-10-01

## 2023-08-06 ENCOUNTER — HEALTH MAINTENANCE LETTER (OUTPATIENT)
Age: 33
End: 2023-08-06

## 2024-07-26 NOTE — PROGRESS NOTES
Mental Health Visit Note    10/25/2019    Start time: 905  Stop Time: 950   Session # 16    Session Type: Patient is presenting for an Individual session.    Cong Tuttle is a 29 y.o. male is being seen today for    Chief Complaint   Patient presents with     MH Follow Up     Depression     Present For Session: Patient and therapist     New symptoms or complaints: None    Functional Impairment:   Personal: 3  Family: 3  Work: 2  Social:3    Clinical assessment of mental status: Mr. Tuttle presented on time for scheduled session today.  He was open and cooperative, and dressed appropriately for this session and weather. His memory was good for both short and long term.  His speech and language was soft and concise throughout the session.  Concentration and focus is within normal limits. Psychosis is not noted or reported. He reports his mood is depressed.  Affect is congruent with speech.  Fund of knowledge is adequate. Insight is adequate for therapy. Reviewed and changes made as needed.     Suicidal/Homicidal Ideation present: Patient denied current suicidal ideations, plans and/or means. Patient agreed to call 911 and/or report to ER if suicidal ideations were to occur. Patient also agreed to follow suicide safety plan.     Patient's impression of their current status: Patient reported feeling depressed and hurt. Patient indicated having a conversation with his [facility] counselor that she is going to take a step back. Patient reported feeling hurt by this even though he knows she is doing it for his best interst. Patient indicated right now having a hard time seeing how this is in the best interest for him. Patient reported he wanted to use cannabis yesterday but didn't due to not being able to get any. Patient indicated feeling he wants to turn to cannabis as a way to help with the cough syrup. Patient reported work has been a continued ongoing stressor. Patient indicated cancelling appointments to be at work  [employer] TEXT for a project and then the project not happening. Patient reported reaching out to his mom who told him to stop crying.     Therapist impression of patients current state: Patient appears to have fair insight into his mental health. This therapist challenged patient on ways he needs to turn to other support network other then his MICD counselor due to eventually graduating from the group and not having the resource. This therapist processed with patient the importance of talking with his support network about his ongoing struggles and allowing them to be a support. This therapist processed with patient his goals for his job and looking at ways he can work to accomplish these goals.     Type of psychotherapeutic technique provided: Insight oriented, Client centered and CBT    Progress toward short term goals: Progress as expected with patient returning to scheduled session, using skills taught is session,  and maintaining sobriety.     Review of long term goals: Treatment Plan Updated on 10/25/2019    Diagnosis:   1. Major depressive disorder, recurrent episode, moderate (H)    2. JOSSIE (generalized anxiety disorder)    3. Moderate alcohol use disorder (H)      Plan and Follow up: Patient appears to have fair insight into his mental health. Patient would benefit from identifying what he wants in a friendship. Patient should work on using skills taught in session to work on reducing self-harm. Patient would benefit from working staying in the moment and not jumping to conclusions. Patient would benefit from using assertive communication with his parents. Patient should work on self-care at work. Patient would benefit from looking at the path he has choices and things he has accomplished. {atient needs to reach out to his support network. Patient should call 911 and/or report to ER if suicidal ideations were to occur.     Discharge Criteria/Planning: Patient will continue with follow-up until therapy can be discontinued  without return of signs and symptoms.    Maria Del Rosario Backer 10/25/2019     DISPLAY PLAN FREE TEXT DISPLAY PLAN FREE TEXT

## 2024-09-28 ENCOUNTER — HEALTH MAINTENANCE LETTER (OUTPATIENT)
Age: 34
End: 2024-09-28